# Patient Record
Sex: FEMALE | Race: BLACK OR AFRICAN AMERICAN | HISPANIC OR LATINO | Employment: UNEMPLOYED | URBAN - METROPOLITAN AREA
[De-identification: names, ages, dates, MRNs, and addresses within clinical notes are randomized per-mention and may not be internally consistent; named-entity substitution may affect disease eponyms.]

---

## 2019-07-19 LAB
HBV SURFACE AG SERPL QL IA: NEGATIVE
INDIRECT COOMBS: NORMAL
RUBELLA IMMUNE STATUS: NORMAL

## 2019-08-06 LAB
HCT VFR BLD AUTO: 34 % (ref 36–46)
HGB BLD-MCNC: 11 G/DL (ref 12–16)

## 2019-08-16 LAB
C TRACH RRNA SPEC QL PROBE: NEGATIVE
HIV 1+2 AB+HIV1 P24 AG SERPL QL IA: NEGATIVE
N GONORRHOEAE, AMPLIFIED DNA: NEGATIVE

## 2019-10-15 ENCOUNTER — CLINICAL SUPPORT (OUTPATIENT)
Dept: URGENT CARE | Facility: CLINIC | Age: 29
End: 2019-10-15
Payer: COMMERCIAL

## 2019-10-15 VITALS
OXYGEN SATURATION: 99 % | DIASTOLIC BLOOD PRESSURE: 78 MMHG | HEART RATE: 101 BPM | BODY MASS INDEX: 27.78 KG/M2 | WEIGHT: 177 LBS | TEMPERATURE: 99 F | HEIGHT: 67 IN | SYSTOLIC BLOOD PRESSURE: 128 MMHG

## 2019-10-15 DIAGNOSIS — J00 ACUTE NASOPHARYNGITIS: Primary | ICD-10-CM

## 2019-10-15 DIAGNOSIS — H65.92 MIDDLE EAR EFFUSION, LEFT: ICD-10-CM

## 2019-10-15 DIAGNOSIS — Z34.90 PREGNANCY, UNSPECIFIED GESTATIONAL AGE: ICD-10-CM

## 2019-10-15 PROCEDURE — 99204 OFFICE O/P NEW MOD 45 MIN: CPT | Mod: S$GLB,,, | Performed by: NURSE PRACTITIONER

## 2019-10-15 PROCEDURE — 99204 PR OFFICE/OUTPT VISIT, NEW, LEVL IV, 45-59 MIN: ICD-10-PCS | Mod: S$GLB,,, | Performed by: NURSE PRACTITIONER

## 2019-10-15 RX ORDER — FLUTICASONE PROPIONATE 50 MCG
2 SPRAY, SUSPENSION (ML) NASAL DAILY
Qty: 15.8 ML | Refills: 0 | Status: SHIPPED | OUTPATIENT
Start: 2019-10-15 | End: 2022-02-28 | Stop reason: SDUPTHER

## 2019-10-15 RX ORDER — LORATADINE 10 MG/1
10 TABLET ORAL DAILY
Qty: 30 TABLET | Refills: 11 | Status: SHIPPED | OUTPATIENT
Start: 2019-10-15 | End: 2020-11-22

## 2019-10-15 NOTE — PROGRESS NOTES
"Subjective:       Patient ID: Jane Garcia is a 28 y.o. female.    Vitals:  height is 5' 7" (1.702 m) and weight is 80.3 kg (177 lb). Her oral temperature is 98.5 °F (36.9 °C). Her blood pressure is 128/78 and her pulse is 101. Her oxygen saturation is 99%.     Chief Complaint: Cough    Patient complains of sinus congestion, runny nose, headache, chills that began today. Patient denies fever, sob, chest pain, nausea, vomiting, diarrhea. Patient reports she is 5 months pregnant.     Cough   This is a new problem. The current episode started today. The problem has been gradually worsening. The cough is non-productive. Associated symptoms include chills, headaches, nasal congestion and rhinorrhea. Pertinent negatives include no chest pain, fever, myalgias, rash, sore throat or shortness of breath. She has tried nothing for the symptoms.       Constitution: Positive for chills. Negative for fatigue and fever.   HENT: Negative for congestion and sore throat.    Neck: Negative for painful lymph nodes.   Cardiovascular: Negative for chest pain and leg swelling.   Eyes: Negative for double vision and blurred vision.   Respiratory: Positive for cough. Negative for shortness of breath.    Gastrointestinal: Negative for nausea, vomiting and diarrhea.   Genitourinary: Negative for dysuria, frequency, urgency and history of kidney stones.   Musculoskeletal: Negative for joint pain, joint swelling, muscle cramps and muscle ache.   Skin: Negative for color change, pale, rash and bruising.   Allergic/Immunologic: Negative for seasonal allergies.   Neurological: Positive for headaches. Negative for dizziness, history of vertigo, light-headedness and passing out.   Hematologic/Lymphatic: Negative for swollen lymph nodes.   Psychiatric/Behavioral: Negative for nervous/anxious, sleep disturbance and depression. The patient is not nervous/anxious.        Objective:      Physical Exam   Constitutional: She is oriented to person, " place, and time. Vital signs are normal. She appears well-developed and well-nourished. She is cooperative.  Non-toxic appearance. She does not have a sickly appearance. She does not appear ill. No distress.   HENT:   Head: Normocephalic and atraumatic.   Right Ear: Hearing, tympanic membrane, external ear and ear canal normal.   Left Ear: Hearing, external ear and ear canal normal. A middle ear effusion is present.   Nose: Mucosal edema and rhinorrhea present. No nasal deformity. No epistaxis. Right sinus exhibits no maxillary sinus tenderness and no frontal sinus tenderness. Left sinus exhibits no maxillary sinus tenderness and no frontal sinus tenderness.   Mouth/Throat: Uvula is midline and mucous membranes are normal. No trismus in the jaw. Normal dentition. No uvula swelling. Posterior oropharyngeal erythema present.   Eyes: Conjunctivae and lids are normal. Right eye exhibits no discharge. Left eye exhibits no discharge. No scleral icterus.   Neck: Trachea normal, normal range of motion, full passive range of motion without pain and phonation normal. Neck supple.   Cardiovascular: Normal rate, regular rhythm, normal heart sounds, intact distal pulses and normal pulses.   Pulmonary/Chest: Effort normal and breath sounds normal. No respiratory distress.   Abdominal: Soft. Normal appearance and bowel sounds are normal. She exhibits no distension, no pulsatile midline mass and no mass. There is no tenderness.   Musculoskeletal: Normal range of motion. She exhibits no edema or deformity.   Neurological: She is alert and oriented to person, place, and time. She exhibits normal muscle tone. Coordination normal.   Skin: Skin is warm, dry, intact, not diaphoretic and not pale.   Psychiatric: She has a normal mood and affect. Her speech is normal and behavior is normal. Judgment and thought content normal. Cognition and memory are normal.   Nursing note and vitals reviewed.        Assessment:       1. Acute  nasopharyngitis    2. Pregnancy, unspecified gestational age        Plan:         The patient appears to have a viral upper respiratory infection with a viral syndrome.  Based upon the history and physical exam the patient does not appear to have a serious bacterial infection such as pneumonia, sepsis, otitis media, bacterial sinusitis, strep pharyngitis, parapharyngeal or peritonsillar abscess, meningitis.  I do not think the patient needs antibiotics as their illness likely has a viral etiology.  Patient appears very well and I have given specific return precautions to the patient and/or family members.  I have instructed the patient to hydrate, take over the counter medications and follow up with their regular doctor or the one provided.      Acute nasopharyngitis    Pregnancy, unspecified gestational age    Other orders  -     fluticasone propionate (FLONASE) 50 mcg/actuation nasal spray; 2 sprays (100 mcg total) by Each Nostril route once daily.  Dispense: 15.8 mL; Refill: 0  -     loratadine (CLARITIN) 10 mg tablet; Take 1 tablet (10 mg total) by mouth once daily.  Dispense: 30 tablet; Refill: 11

## 2019-10-15 NOTE — PATIENT INSTRUCTIONS
Viral Upper Respiratory Illness (Adult)  You have a viral upper respiratory illness (URI), which is another term for the common cold. This illness is contagious during the first few days. It is spread through the air by coughing and sneezing. It may also be spread by direct contact (touching the sick person and then touching your own eyes, nose, or mouth). Frequent handwashing will decrease risk of spread. Most viral illnesses go away within 7 to 10 days with rest and simple home remedies. Sometimes the illness may last for several weeks. Antibiotics will not kill a virus, and they are generally not prescribed for this condition.    Home care  · If symptoms are severe, rest at home for the first 2 to 3 days. When you resume activity, don't let yourself get too tired.  · Avoid being exposed to cigarette smoke (yours or others).  · You may use acetaminophen or ibuprofen to control pain and fever, unless another medicine was prescribed. (Note: If you have chronic liver or kidney disease, have ever had a stomach ulcer or gastrointestinal bleeding, or are taking blood-thinning medicines, talk with your healthcare provider before using these medicines.) Aspirin should never be given to anyone under 18 years of age who is ill with a viral infection or fever. It may cause severe liver or brain damage.  · Your appetite may be poor, so a light diet is fine. Avoid dehydration by drinking 6 to 8 glasses of fluids per day (water, soft drinks, juices, tea, or soup). Extra fluids will help loosen secretions in the nose and lungs.  · Over-the-counter cold medicines will not shorten the length of time youre sick, but they may be helpful for the following symptoms: cough, sore throat, and nasal and sinus congestion. (Note: Do not use decongestants if you have high blood pressure.)  Follow-up care  Follow up with your healthcare provider, or as advised.  When to seek medical advice  Call your healthcare provider right away if any  of these occur:  · Cough with lots of colored sputum (mucus)  · Severe headache; face, neck, or ear pain  · Difficulty swallowing due to throat pain  · Fever of 100.4°F (38°C)  Call 911, or get immediate medical care  Call emergency services right away if any of these occur:  · Chest pain, shortness of breath, wheezing, or difficulty breathing  · Coughing up blood  · Inability to swallow due to throat pain  Date Last Reviewed: 9/13/2015  © 4116-4217 [employer]. [address] [place] [address]. All rights reserved. This information is not intended as a substitute for professional medical care. Always follow your healthcare professional's instructions.        Adult Self-Care for Colds  Colds are caused by viruses. They can't be cured with antibiotics. However, you can ease symptoms and support your body's efforts to heal itself.  No matter which symptoms you have, be sure to:  · Drink plenty of fluids (water or clear soup)  · Stop smoking and drinking alcohol  · Get plenty of rest    Understand a fever  · Take your temperature several times a day. If your fever is 100.4°F (38.0°C) for more than a day, call your healthcare provider.  · Relax, lie down. Go to bed if you want. Just get off your feet and rest. Also, drink plenty of fluids to avoid dehydration.  · Take acetaminophen or a nonsteroidal anti-inflammatory agent (NSAID), such as ibuprofen.  Treat a troubled nose kindly  · Breathe steam or heated humidified air to open blocked nasal passages.  a hot shower or use a vaporizer. Be careful not to get burned by the steam.  · Saline nasal sprays and decongestant tablets help open a stuffy nose. Antihistamines can also help, but they can cause side effects such as drowsiness and drying of the eyes, nose, and mouth.  Soothe a sore throat and cough  · Gargle every 2 hours with 1/4 teaspoon of salt dissolved in 1/2 cup of warm water. Suck on throat lozenges and cough drops to moisten your  throat.  · Cough medicines are available but it is unclear how well they actually work.  · Take acetaminophen or an NSAID, such as ibuprofen, to ease throat pain  Ease digestive problems  · Put fluids back into your body. Take frequent sips of clear liquids such as water or broth. Avoid drinks that have a lot of sugar in them, such as juices and sodas. These can make diarrhea worse. Older children and adults can drink sports drinks.  · As your appetite returns, you can resume your normal diet. Ask your healthcare provider if there are any foods you should avoid.  When to seek medical care  When you first notice symptoms, ask your healthcare provider if antiviral medicines are appropriate. Antibiotics should not be taken for colds or flu. Also, call your healthcare provider if you have any of the following symptoms or if you aren't feeling better after 7 days:  · Shortness of breath  · Pain or pressure in the chest or belly (abdomen)  · Worsening symptoms, especially after a period of improvement  · Fever of 100.4°F  (38.0°C) or higher, or fever that doesn't go down with medicine  · Sudden dizziness or confusion  · Severe or continued vomiting  · Signs of dehydration, including extreme thirst, dark urine, infrequent urination, dry mouth  · Spotted, red, or very sore throat   Date Last Reviewed: 12/1/2016  © 9574-2624 [employer]. [address] [place] [address], [place] [address]. All rights reserved. This information is not intended as a substitute for professional medical care. Always follow your healthcare professional's instructions.

## 2020-02-19 ENCOUNTER — HOSPITAL ENCOUNTER (INPATIENT)
Facility: HOSPITAL | Age: 30
LOS: 2 days | Discharge: HOME | End: 2020-02-21
Attending: OBSTETRICS & GYNECOLOGY | Admitting: OBSTETRICS & GYNECOLOGY
Payer: MEDICAID

## 2020-02-19 DIAGNOSIS — B96.89 BACTERIAL VAGINOSIS: ICD-10-CM

## 2020-02-19 DIAGNOSIS — N76.0 BACTERIAL VAGINOSIS: ICD-10-CM

## 2020-02-19 DIAGNOSIS — Z34.90 ENCOUNTER FOR INDUCTION OF LABOR: ICD-10-CM

## 2020-02-19 DIAGNOSIS — Z34.02 ENCOUNTER FOR SUPERVISION OF NORMAL FIRST PREGNANCY IN SECOND TRIMESTER: ICD-10-CM

## 2020-02-19 DIAGNOSIS — O26.892 VAGINAL DISCHARGE DURING PREGNANCY IN SECOND TRIMESTER: Primary | ICD-10-CM

## 2020-02-19 DIAGNOSIS — N89.8 VAGINAL DISCHARGE DURING PREGNANCY IN SECOND TRIMESTER: Primary | ICD-10-CM

## 2020-02-19 LAB
ABO + RH BLD: NORMAL
AMPHET+METHAMPHET UR QL: NEGATIVE
BACTERIA #/AREA URNS HPF: ABNORMAL /HPF
BARBITURATES UR QL SCN>200 NG/ML: NEGATIVE
BASOPHILS # BLD AUTO: 0.03 K/UL (ref 0–0.2)
BASOPHILS NFR BLD: 0.4 % (ref 0–1.9)
BENZODIAZ UR QL SCN>200 NG/ML: NEGATIVE
BILIRUB UR QL STRIP: NEGATIVE
BLD GP AB SCN CELLS X3 SERPL QL: NORMAL
BZE UR QL SCN: NEGATIVE
CANNABINOIDS UR QL SCN: NEGATIVE
CLARITY UR: ABNORMAL
COLOR UR: YELLOW
CREAT UR-MCNC: 213 MG/DL (ref 15–325)
DIFFERENTIAL METHOD: ABNORMAL
EOSINOPHIL # BLD AUTO: 0.1 K/UL (ref 0–0.5)
EOSINOPHIL NFR BLD: 1.3 % (ref 0–8)
ERYTHROCYTE [DISTWIDTH] IN BLOOD BY AUTOMATED COUNT: 15 % (ref 11.5–14.5)
GLUCOSE UR QL STRIP: NEGATIVE
HCT VFR BLD AUTO: 28.7 % (ref 37–48.5)
HGB BLD-MCNC: 8.9 G/DL (ref 12–16)
HGB UR QL STRIP: NEGATIVE
HYALINE CASTS #/AREA URNS LPF: 90 /LPF
IMM GRANULOCYTES # BLD AUTO: 0.05 K/UL (ref 0–0.04)
IMM GRANULOCYTES NFR BLD AUTO: 0.7 % (ref 0–0.5)
KETONES UR QL STRIP: NEGATIVE
LEUKOCYTE ESTERASE UR QL STRIP: NEGATIVE
LYMPHOCYTES # BLD AUTO: 2.1 K/UL (ref 1–4.8)
LYMPHOCYTES NFR BLD: 28.1 % (ref 18–48)
MCH RBC QN AUTO: 24.2 PG (ref 27–31)
MCHC RBC AUTO-ENTMCNC: 31 G/DL (ref 32–36)
MCV RBC AUTO: 78 FL (ref 82–98)
MICROSCOPIC COMMENT: ABNORMAL
MONOCYTES # BLD AUTO: 0.7 K/UL (ref 0.3–1)
MONOCYTES NFR BLD: 8.7 % (ref 4–15)
NEUTROPHILS # BLD AUTO: 4.6 K/UL (ref 1.8–7.7)
NEUTROPHILS NFR BLD: 60.8 % (ref 38–73)
NITRITE UR QL STRIP: NEGATIVE
NRBC BLD-RTO: 0 /100 WBC
OPIATES UR QL SCN: NEGATIVE
PCP UR QL SCN>25 NG/ML: NEGATIVE
PCP UR QL SCN>25 NG/ML: NEGATIVE
PH UR STRIP: 8 [PH] (ref 5–8)
PLATELET # BLD AUTO: 412 K/UL (ref 150–350)
PMV BLD AUTO: 9.3 FL (ref 9.2–12.9)
PROT UR QL STRIP: ABNORMAL
RBC # BLD AUTO: 3.68 M/UL (ref 4–5.4)
RBC #/AREA URNS HPF: 0 /HPF (ref 0–4)
SP GR UR STRIP: 1.02 (ref 1–1.03)
SQUAMOUS #/AREA URNS HPF: 34 /HPF
TOXICOLOGY INFORMATION: NORMAL
URN SPEC COLLECT METH UR: ABNORMAL
UROBILINOGEN UR STRIP-ACNC: NEGATIVE EU/DL
WBC # BLD AUTO: 7.48 K/UL (ref 3.9–12.7)
WBC #/AREA URNS HPF: 2 /HPF (ref 0–5)

## 2020-02-19 PROCEDURE — 80307 DRUG TEST PRSMV CHEM ANLYZR: CPT

## 2020-02-19 PROCEDURE — 86592 SYPHILIS TEST NON-TREP QUAL: CPT

## 2020-02-19 PROCEDURE — 85025 COMPLETE CBC W/AUTO DIFF WBC: CPT

## 2020-02-19 PROCEDURE — 81001 URINALYSIS AUTO W/SCOPE: CPT

## 2020-02-19 PROCEDURE — 12000002 HC ACUTE/MED SURGE SEMI-PRIVATE ROOM

## 2020-02-19 PROCEDURE — 86901 BLOOD TYPING SEROLOGIC RH(D): CPT

## 2020-02-19 PROCEDURE — 36415 COLL VENOUS BLD VENIPUNCTURE: CPT

## 2020-02-19 PROCEDURE — 63600175 PHARM REV CODE 636 W HCPCS: Performed by: OBSTETRICS & GYNECOLOGY

## 2020-02-19 PROCEDURE — 25000003 PHARM REV CODE 250: Performed by: OBSTETRICS & GYNECOLOGY

## 2020-02-19 RX ORDER — MISOPROSTOL 100 UG/1
600 TABLET ORAL
Status: CANCELLED | OUTPATIENT
Start: 2020-02-19

## 2020-02-19 RX ORDER — ONDANSETRON 2 MG/ML
4 INJECTION INTRAMUSCULAR; INTRAVENOUS EVERY 6 HOURS PRN
Status: DISCONTINUED | OUTPATIENT
Start: 2020-02-19 | End: 2020-02-21 | Stop reason: HOSPADM

## 2020-02-19 RX ORDER — SIMETHICONE 80 MG
1 TABLET,CHEWABLE ORAL 4 TIMES DAILY PRN
Status: DISCONTINUED | OUTPATIENT
Start: 2020-02-19 | End: 2020-02-21 | Stop reason: HOSPADM

## 2020-02-19 RX ORDER — BUTORPHANOL TARTRATE 2 MG/ML
1 INJECTION INTRAMUSCULAR; INTRAVENOUS
Status: DISCONTINUED | OUTPATIENT
Start: 2020-02-19 | End: 2020-02-21 | Stop reason: HOSPADM

## 2020-02-19 RX ORDER — OXYTOCIN-SODIUM CHLORIDE 0.9% IV SOLN 30 UNIT/500ML 30-0.9/5 UT/ML-%
2 SOLUTION INTRAVENOUS CONTINUOUS
Status: DISCONTINUED | OUTPATIENT
Start: 2020-02-19 | End: 2020-02-21 | Stop reason: HOSPADM

## 2020-02-19 RX ORDER — SODIUM CHLORIDE 9 MG/ML
INJECTION, SOLUTION INTRAVENOUS
Status: DISCONTINUED | OUTPATIENT
Start: 2020-02-19 | End: 2020-02-21 | Stop reason: HOSPADM

## 2020-02-19 RX ORDER — BUTORPHANOL TARTRATE 2 MG/ML
2 INJECTION INTRAMUSCULAR; INTRAVENOUS
Status: CANCELLED | OUTPATIENT
Start: 2020-02-19

## 2020-02-19 RX ORDER — CALCIUM CARBONATE 200(500)MG
500 TABLET,CHEWABLE ORAL 3 TIMES DAILY PRN
Status: DISCONTINUED | OUTPATIENT
Start: 2020-02-19 | End: 2020-02-21 | Stop reason: HOSPADM

## 2020-02-19 RX ORDER — SODIUM CHLORIDE, SODIUM LACTATE, POTASSIUM CHLORIDE, CALCIUM CHLORIDE 600; 310; 30; 20 MG/100ML; MG/100ML; MG/100ML; MG/100ML
INJECTION, SOLUTION INTRAVENOUS CONTINUOUS
Status: DISCONTINUED | OUTPATIENT
Start: 2020-02-19 | End: 2020-02-21 | Stop reason: HOSPADM

## 2020-02-19 RX ORDER — ZOLPIDEM TARTRATE 5 MG/1
5 TABLET ORAL NIGHTLY PRN
Status: DISCONTINUED | OUTPATIENT
Start: 2020-02-19 | End: 2020-02-21 | Stop reason: HOSPADM

## 2020-02-19 RX ADMIN — PROMETHAZINE HYDROCHLORIDE 12.5 MG: 25 INJECTION INTRAMUSCULAR; INTRAVENOUS at 08:02

## 2020-02-19 RX ADMIN — BUTORPHANOL TARTRATE 1 MG: 2 INJECTION, SOLUTION INTRAMUSCULAR; INTRAVENOUS at 08:02

## 2020-02-19 RX ADMIN — DINOPROSTONE 10 MG: 10 INSERT VAGINAL at 01:02

## 2020-02-19 RX ADMIN — SODIUM CHLORIDE, POTASSIUM CHLORIDE, SODIUM LACTATE AND CALCIUM CHLORIDE 125 ML/HR: 600; 310; 30; 20 INJECTION, SOLUTION INTRAVENOUS at 10:02

## 2020-02-19 RX ADMIN — ZOLPIDEM TARTRATE 5 MG: 5 TABLET ORAL at 07:02

## 2020-02-20 ENCOUNTER — ANESTHESIA EVENT (OUTPATIENT)
Dept: OBSTETRICS AND GYNECOLOGY | Facility: HOSPITAL | Age: 30
End: 2020-02-20
Payer: MEDICAID

## 2020-02-20 ENCOUNTER — ANESTHESIA (OUTPATIENT)
Dept: OBSTETRICS AND GYNECOLOGY | Facility: HOSPITAL | Age: 30
End: 2020-02-20
Payer: MEDICAID

## 2020-02-20 LAB — RPR SER QL: NORMAL

## 2020-02-20 PROCEDURE — 99900035 HC TECH TIME PER 15 MIN (STAT)

## 2020-02-20 PROCEDURE — 25000003 PHARM REV CODE 250: Performed by: OBSTETRICS & GYNECOLOGY

## 2020-02-20 PROCEDURE — 62326 NJX INTERLAMINAR LMBR/SAC: CPT | Performed by: ANESTHESIOLOGY

## 2020-02-20 PROCEDURE — 12000002 HC ACUTE/MED SURGE SEMI-PRIVATE ROOM

## 2020-02-20 PROCEDURE — C1751 CATH, INF, PER/CENT/MIDLINE: HCPCS | Performed by: ANESTHESIOLOGY

## 2020-02-20 PROCEDURE — 63600175 PHARM REV CODE 636 W HCPCS: Performed by: ANESTHESIOLOGY

## 2020-02-20 PROCEDURE — 72200004 HC VAGINAL DELIVERY LEVEL I

## 2020-02-20 PROCEDURE — 63600175 PHARM REV CODE 636 W HCPCS: Performed by: OBSTETRICS & GYNECOLOGY

## 2020-02-20 PROCEDURE — 94761 N-INVAS EAR/PLS OXIMETRY MLT: CPT

## 2020-02-20 RX ORDER — NALOXONE HCL 0.4 MG/ML
0.4 VIAL (ML) INJECTION SEE ADMIN INSTRUCTIONS
Status: DISCONTINUED | OUTPATIENT
Start: 2020-02-20 | End: 2020-02-21 | Stop reason: HOSPADM

## 2020-02-20 RX ORDER — ONDANSETRON 2 MG/ML
4 INJECTION INTRAMUSCULAR; INTRAVENOUS ONCE
Status: DISCONTINUED | OUTPATIENT
Start: 2020-02-20 | End: 2020-02-21 | Stop reason: HOSPADM

## 2020-02-20 RX ORDER — DIPHENHYDRAMINE HYDROCHLORIDE 50 MG/ML
12.5 INJECTION INTRAMUSCULAR; INTRAVENOUS EVERY 4 HOURS PRN
Status: DISCONTINUED | OUTPATIENT
Start: 2020-02-20 | End: 2020-02-21 | Stop reason: HOSPADM

## 2020-02-20 RX ORDER — OXYCODONE AND ACETAMINOPHEN 10; 325 MG/1; MG/1
1 TABLET ORAL EVERY 4 HOURS PRN
Status: DISCONTINUED | OUTPATIENT
Start: 2020-02-20 | End: 2020-02-21 | Stop reason: HOSPADM

## 2020-02-20 RX ORDER — ROPIVACAINE HYDROCHLORIDE 2 MG/ML
INJECTION, SOLUTION EPIDURAL; INFILTRATION
Status: DISCONTINUED | OUTPATIENT
Start: 2020-02-20 | End: 2020-02-20

## 2020-02-20 RX ORDER — FENTANYL/BUPIVACAINE/NS/PF 2-625MCG/1
PLASTIC BAG, INJECTION (ML) INJECTION CONTINUOUS
Status: DISCONTINUED | OUTPATIENT
Start: 2020-02-20 | End: 2020-02-21 | Stop reason: HOSPADM

## 2020-02-20 RX ORDER — OXYCODONE AND ACETAMINOPHEN 5; 325 MG/1; MG/1
1 TABLET ORAL EVERY 4 HOURS PRN
Status: DISCONTINUED | OUTPATIENT
Start: 2020-02-20 | End: 2020-02-21 | Stop reason: HOSPADM

## 2020-02-20 RX ORDER — EPHEDRINE SULFATE 50 MG/ML
10 INJECTION, SOLUTION INTRAVENOUS ONCE AS NEEDED
Status: DISCONTINUED | OUTPATIENT
Start: 2020-02-20 | End: 2020-02-21 | Stop reason: HOSPADM

## 2020-02-20 RX ORDER — ONDANSETRON 4 MG/1
8 TABLET, ORALLY DISINTEGRATING ORAL EVERY 8 HOURS PRN
Status: DISCONTINUED | OUTPATIENT
Start: 2020-02-20 | End: 2020-02-21 | Stop reason: HOSPADM

## 2020-02-20 RX ADMIN — ROPIVACAINE HYDROCHLORIDE 3 ML: 2 INJECTION, SOLUTION EPIDURAL; INFILTRATION at 03:02

## 2020-02-20 RX ADMIN — IBUPROFEN 600 MG: 200 TABLET, FILM COATED ORAL at 05:02

## 2020-02-20 RX ADMIN — ROPIVACAINE HYDROCHLORIDE 3 ML: 2 INJECTION, SOLUTION EPIDURAL; INFILTRATION at 02:02

## 2020-02-20 RX ADMIN — Medication 2 MILLI-UNITS/MIN: at 01:02

## 2020-02-20 RX ADMIN — OXYCODONE HYDROCHLORIDE AND ACETAMINOPHEN 1 TABLET: 10; 325 TABLET ORAL at 10:02

## 2020-02-20 RX ADMIN — OXYCODONE HYDROCHLORIDE AND ACETAMINOPHEN 1 TABLET: 10; 325 TABLET ORAL at 05:02

## 2020-02-20 RX ADMIN — SODIUM CHLORIDE, POTASSIUM CHLORIDE, SODIUM LACTATE AND CALCIUM CHLORIDE: 600; 310; 30; 20 INJECTION, SOLUTION INTRAVENOUS at 11:02

## 2020-02-20 RX ADMIN — OXYCODONE AND ACETAMINOPHEN 1 TABLET: 5; 325 TABLET ORAL at 12:02

## 2020-02-20 RX ADMIN — SODIUM CHLORIDE, POTASSIUM CHLORIDE, SODIUM LACTATE AND CALCIUM CHLORIDE 1000 ML: 600; 310; 30; 20 INJECTION, SOLUTION INTRAVENOUS at 01:02

## 2020-02-20 RX ADMIN — SODIUM CHLORIDE, POTASSIUM CHLORIDE, SODIUM LACTATE AND CALCIUM CHLORIDE 125 ML/HR: 600; 310; 30; 20 INJECTION, SOLUTION INTRAVENOUS at 05:02

## 2020-02-20 NOTE — PROGRESS NOTES
1002: Updated Dr. Andujar; late decels noted, pt repositioned, bolused fluids, decreased pitocin gtt from 6cc/hr to 10cc/hr; FHR better. SVE: 4/70/-2  1132: Updated Dr. Andujar: Pt c/o feeling pressure; variable noted on strip; SVE: 9.5/100/0. MD states to notify her when pt is ready for delivery.

## 2020-02-20 NOTE — PLAN OF CARE
Pt AAOx4. VSS. Denies pain. POC reviewed with pt; all questions answered. NADN. Will continue to monitor.

## 2020-02-20 NOTE — PLAN OF CARE
Plan of care reviewed with Pt verbalized understanding.  2/20/2020 @ 0644- Reported to Dr Andujar Pt progress and FHT, no new orders.

## 2020-02-20 NOTE — SUBJECTIVE & OBJECTIVE
Interval History:  Jane KULKARNI is a 29 y.o.  at 39w5d. She is doing well. She is comfortable after epidural    Objective:     Vital Signs (Most Recent):  Temp: 98.3 °F (36.8 °C) (20)  Pulse: 78 (20)  Resp: 18 (20)  BP: 125/74 (20) Vital Signs (24h Range):  Temp:  [98 °F (36.7 °C)-98.8 °F (37.1 °C)] 98.3 °F (36.8 °C)  Pulse:  [] 78  Resp:  [16-18] 18  BP: (109-140)/(61-87) 125/74     Weight: 83 kg (183 lb)  Body mass index is 28.66 kg/m².    FHT: Cat 1 (reassuring)  TOCO:  Q 2 minutes    Cervical Exam:  Dilation:  3  Effacement:  70  Station: -2  Presentation: Vertex     Significant Labs:  Lab Results   Component Value Date    GROUPTRH A POS 2020    HEPBSAG Negative 2019       I have personallly reviewed all pertinent lab results from the last 24 hours.    Physical Exam

## 2020-02-20 NOTE — PLAN OF CARE
Pt AAOx4. VSS. Epidural infusing; pt resting comfortably in bed. Denies pain @ this time. Bingham in place draining clear, yellow urine. POC reviewed with pt; all questions answered. NADN. Will continue to monitor.

## 2020-02-20 NOTE — L&D DELIVERY NOTE
[facility]  Vaginal Delivery   Operative Note    SUMMARY     Normal spontaneous vaginal delivery of live infant, . The patient began pushing at c/c/+1.  After 5 mins of pushing, infant delivered in OA position.  Shoulders/body followed easily.  Infant placed on patient's abdomen with nursery nurse present.  Cord clamped and cut.  Placenta S/S/I.  Uterus firm below umbilicus.  Sponge, lap, needle counts correct.  The patient tolerated well.  EBL 200ml, apgars 9/9.  Infant delivered position OA over intact perineum.  Nuchal cord: No.    Spontaneous delivery of placenta and IV pitocin given noting good uterine tone.  No lacerations noted.  Patient tolerated delivery well. Sponge needle and lap counted correctly x2.    Indications: Encounter for induction of labor  Pregnancy complicated by:   Patient Active Problem List   Diagnosis    Vaginal discharge during pregnancy in second trimester    Supervision of normal pregnancy in second trimester    Bacterial vaginosis    Encounter for induction of labor     Admitting GA: 39w5d    Delivery Information for Jai Garcia    Birth information:  YOB: 2022   Time of birth: 12:03 PM   Sex: male   Head Delivery Date/Time: 2/20/2020 12:03 PM   Delivery type: Vaginal, Spontaneous   Gestational Age: 39w5d    Delivery Providers    Delivering clinician:  Celina Andujar MD   Provider Role    Darnell Reyes, JOHN Scrub Nurse    Sara Ann RN Delivery Nurse    Micki Simon RN Delivery Nurse    Chano Servin, NICOLE Registered Nurse            Measurements    Weight:    Length:           Apgars    Living status:  Living  Apgars:   1 min.:   5 min.:   10 min.:   15 min.:   20 min.:     Skin color:   1  1       Heart rate:   2  2       Reflex irritability:   2  2       Muscle tone:   2  2       Respiratory effort:   2  2       Total:   9  9       Apgars assigned by:  CHANO SERVIN RN         Operative Delivery    Forceps attempted?:  No  Vacuum  extractor attempted?:  No         Shoulder Dystocia    Shoulder dystocia present?:  No           Presentation    Presentation:  Vertex           Interventions/Resuscitation    Method:  Bulb Suctioning, Tactile Stimulation       Cord    Vessels:  3 vessels  Complications:  None  Delayed Cord Clamping?:  No  Cord Clamped Date/Time:  2020 12:04 PM  Cord Blood Disposition:  Sent with Baby  Gases Sent?:  No  Stem Cell Collection (by MD):  No       Placenta    Placenta delivery date/time:  2020 1206  Placenta removal:  Spontaneous  Placenta appearance:  Intact  Placenta disposition:  discarded           Labor Events:       labor: No     Labor Onset Date/Time: 2020       Dilation Complete Date/Time: 2020 11:47     Start Pushing Date/Time: 2020 12:00       Start Pushing Date/Time: 2020 12:00     Rupture Date/Time: 20         Rupture type: artificial rupture of membranes         Fluid Amount:        Fluid Color: Clear      Fluid Odor:        Membrane Status: ARM (Artificial Rupture)               steroids: None     Antibiotics given for GBS: No     Induction: dinoprostone insert     Indications for induction:  Elective     Augmentation: oxytocin     Indications for augmentation:       Labor complications: None     Additional complications:          Cervical ripening:                     Delivery:      Episiotomy:       Indication for Episiotomy:       Perineal Lacerations:   Repaired:      Periurethral Laceration:   Repaired:     Labial Laceration:   Repaired:     Sulcus Laceration:   Repaired:     Vaginal Laceration:   Repaired:     Cervical Laceration:   Repaired:     Repair suture:       Repair # of packets:       Last Value - EBL - Nursing (mL):       Sum - EBL - Nursing (mL): 0     Last Value - EBL - Anesthesia (mL):      Calculated QBL (mL):        Vaginal Sweep Performed:       Surgicount Correct:         Other providers:       Anesthesia    Method:   Epidural          Details (if applicable):  Trial of Labor      Categorization:      Priority:     Indications for :     Incision Type:       Additional  information:  Forceps:    Vacuum:    Breech:    Observed anomalies    Other (Comments): Band # [id number]; second band given to FOB

## 2020-02-20 NOTE — PLAN OF CARE
Pt AAOx4. VSS. S/p delivery of viable baby boy. Fundus firm @ Umbilicus; lochia small. Bingham in place, draining clear, yellow urine. Pitocin gtt infusing @ 95cc/hr. Pain controlled per PRN pain medication. Fall precautions maintained. POC reviewed with pt; all questions answered. NADN. Will continue to monitor.

## 2020-02-20 NOTE — ANESTHESIA PROCEDURE NOTES
"Epidural    Patient location during procedure: OB   Reason for block: primary anesthetic   Diagnosis: IUP   Start time: 2/20/2020 2:57 AM  Timeout: 2/20/2020 2:52 AM  End time: 2/20/2020 2:59 AM    Staffing  Performing Provider: Juan Young MD  Authorizing Provider: Juan Yuong MD        Preanesthetic Checklist  Completed: patient identified, site marked, surgical consent, pre-op evaluation, timeout performed, IV checked, risks and benefits discussed, monitors and equipment checked, anesthesia consent given, hand hygiene performed and patient being monitored  Preparation  Patient position: sitting  Prep: Betadine  Patient monitoring: ECG and Blood Pressure  Epidural  Skin Anesthetic: lidocaine 1%  Skin Wheal: 5 mL  Administration type: continuous  Approach: midline  Interspace: L3-4    Injection technique: JAMIE air  Needle and Epidural Catheter  Needle type: Tuohy   Needle gauge: 17  Needle length: 3.5 inches  Needle insertion depth: 2.5 cm  Catheter type: springwound  Catheter size: 19 G  Catheter at skin depth: 14 cm  Test dose: 3 mL of lidocaine 1.5% with Epi 1-to-200,000  Additional Documentation: incremental injection, negative aspiration for heme and CSF, no paresthesia on injection, no signs/symptoms of IV or SA injection, no significant pain on injection and no significant complaints from patient  Needle localization: anatomical landmarks  Assessment  Upper dermatomal levels - Left: T9  Right: T9   Dermatomal levels determined by alcohol wipe  Ease of block: easy  Patient's tolerance of the procedure: comfortable throughout block and no complaints  Additional Notes  The patient was ID and examined with chart and labs reviewed. The risk benefits alternatives to the epidural were discussed with the patient with all questions answered and the patient asked if she desired the epidural and she responded "yes". The consents were signed and a timeout performed.   No inadvertent dural puncture with " Kayley.  Dural puncture not performed with spinal needle

## 2020-02-20 NOTE — PROGRESS NOTES
[facility]  Obstetrics  Labor Progress Note    Patient Name: Jane Garcia  MRN: [id number]  Admission Date: 2020  Hospital Length of Stay: 1 days  Attending Physician: Celina Andujar MD  Primary Care Provider: Primary Doctor No    Subjective:     Principal Problem:Encounter for induction of labor    Hospital Course:  No notes on file    Interval History:  Jane KULKARNI is a 29 y.o.  at 39w5d. She is doing well. She is comfortable after epidural    Objective:     Vital Signs (Most Recent):  Temp: 98.3 °F (36.8 °C) (20)  Pulse: 78 (20)  Resp: 18 (20)  BP: 125/74 (20) Vital Signs (24h Range):  Temp:  [98 °F (36.7 °C)-98.8 °F (37.1 °C)] 98.3 °F (36.8 °C)  Pulse:  [] 78  Resp:  [16-18] 18  BP: (109-140)/(61-87) 125/74     Weight: 83 kg (183 lb)  Body mass index is 28.66 kg/m².    FHT: Cat 1 (reassuring)  TOCO:  Q 2 minutes    Cervical Exam:  Dilation:  3  Effacement:  70  Station: -2  Presentation: Vertex     Significant Labs:  Lab Results   Component Value Date    GROUPTRH A POS 2020    HEPBSAG Negative 2019       I have personallly reviewed all pertinent lab results from the last 24 hours.    Physical Exam    Assessment/Plan:     29 y.o. female  at 39w5d for:    * Encounter for induction of labor  IUP at 39.5 for IOL  Doing well    AROM - clear  Continue pitocin          Celina Andujar MD  Obstetrics  [facility] hospital

## 2020-02-20 NOTE — NURSING TRANSFER
Nursing Transfer Note      2/20/2020     Transfer FROM L&D to [facility]; [facility]    Transfer via wheelchair    Transfer with pt belongings    Transported by RN    Medicines sent: N/A    Chart send with patient: Yes    Notified: , with pt    Upon arrival to floor: Pt AAOx4. NADN. Bedside report handed off to NICOLE Baires

## 2020-02-20 NOTE — ANESTHESIA PREPROCEDURE EVALUATION
02/20/2020  Jane Garcia is a 29 y.o., female.  Patient Active Problem List   Diagnosis    Vaginal discharge during pregnancy in second trimester    Supervision of normal pregnancy in second trimester    Bacterial vaginosis    Encounter for induction of labor       History reviewed. No pertinent surgical history.     Tobacco Use:  The patient  reports that she has never smoked. She does not have any smokeless tobacco history on file.     No results found for this or any previous visit.          Lab Results   Component Value Date    WBC 7.48 02/19/2020    HGB 8.9 (L) 02/19/2020    HCT 28.7 (L) 02/19/2020    MCV 78 (L) 02/19/2020     (H) 02/19/2020     BMP  Lab Results   Component Value Date     11/29/2016    K 3.1 (L) 11/29/2016     11/29/2016    CO2 22 (L) 11/29/2016    BUN 5 (L) 11/29/2016    CREATININE 0.9 11/29/2016    CALCIUM 9.1 11/29/2016    ANIONGAP 11 11/29/2016    ESTGFRAFRICA >60 11/29/2016    EGFRNONAA >60 11/29/2016         Anesthesia Evaluation    I have reviewed the Patient Summary Reports.    I have reviewed the Nursing Notes.   I have reviewed the Medications.     Review of Systems  Anesthesia Hx:  No previous Anesthesia Denies Hx of Anesthetic complications  Neg history of prior surgery. Denies Family Hx of Anesthesia complications.   Denies Personal Hx of Anesthesia complications.   Social:  No Alcohol Use, Non-Smoker    Hematology/Oncology:     Oncology Normal    -- Anemia:   EENT/Dental:EENT/Dental Normal   Cardiovascular:  Cardiovascular Normal     Pulmonary:  Pulmonary Normal    Renal/:  Renal/ Normal     Hepatic/GI:   GERD, well controlled    Musculoskeletal:  Musculoskeletal Normal    Neurological:  Neurology Normal    Endocrine:  Endocrine Normal    Dermatological:  Skin Normal    Psych:  Psychiatric Normal           Physical Exam  General:  Well  nourished    Airway/Jaw/Neck:  Airway Findings: Mouth Opening: Normal Tongue: Normal  General Airway Assessment: Adult  Mallampati: III  Improves to III with phonation.  TM Distance: < 4 cm  Jaw/Neck Findings:  Neck ROM: Normal ROM      Dental:  Dental Findings: In tact   Chest/Lungs:  Chest/Lungs Findings: Clear to auscultation, Normal Respiratory Rate     Heart/Vascular:  Heart Findings: Rate: Normal  Rhythm: Regular Rhythm  Sounds: Normal        Mental Status:  Mental Status Findings:  Cooperative, Alert and Oriented         Anesthesia Plan  Type of Anesthesia, risks & benefits discussed:  Anesthesia Type:  epidural  Patient's Preference: epidural  Intra-op Monitoring Plan: standard ASA monitors  Intra-op Monitoring Plan Comments:   Post Op Pain Control Plan: per primary service following discharge from PACU  Post Op Pain Control Plan Comments:   Induction:    Beta Blocker:  Patient is not currently on a Beta-Blocker (No further documentation required).       Informed Consent: Patient understands risks and agrees with Anesthesia plan.  Questions answered. Anesthesia consent signed with patient.  ASA Score: 2     Day of Surgery Review of History & Physical:        Anesthesia Plan Notes: Labor Epidural        Ready For Surgery From Anesthesia Perspective.

## 2020-02-21 VITALS
BODY MASS INDEX: 28.72 KG/M2 | HEART RATE: 74 BPM | TEMPERATURE: 98 F | HEIGHT: 67 IN | SYSTOLIC BLOOD PRESSURE: 120 MMHG | OXYGEN SATURATION: 74 % | WEIGHT: 183 LBS | DIASTOLIC BLOOD PRESSURE: 81 MMHG | RESPIRATION RATE: 18 BRPM

## 2020-02-21 LAB
BASOPHILS # BLD AUTO: 0.05 K/UL (ref 0–0.2)
BASOPHILS NFR BLD: 0.5 % (ref 0–1.9)
DIFFERENTIAL METHOD: ABNORMAL
EOSINOPHIL # BLD AUTO: 0.2 K/UL (ref 0–0.5)
EOSINOPHIL NFR BLD: 2.1 % (ref 0–8)
ERYTHROCYTE [DISTWIDTH] IN BLOOD BY AUTOMATED COUNT: 15.3 % (ref 11.5–14.5)
HCT VFR BLD AUTO: 30.2 % (ref 37–48.5)
HGB BLD-MCNC: 9.2 G/DL (ref 12–16)
IMM GRANULOCYTES # BLD AUTO: 0.06 K/UL (ref 0–0.04)
IMM GRANULOCYTES NFR BLD AUTO: 0.6 % (ref 0–0.5)
LYMPHOCYTES # BLD AUTO: 3.4 K/UL (ref 1–4.8)
LYMPHOCYTES NFR BLD: 31 % (ref 18–48)
MCH RBC QN AUTO: 24.1 PG (ref 27–31)
MCHC RBC AUTO-ENTMCNC: 30.5 G/DL (ref 32–36)
MCV RBC AUTO: 79 FL (ref 82–98)
MONOCYTES # BLD AUTO: 1 K/UL (ref 0.3–1)
MONOCYTES NFR BLD: 9 % (ref 4–15)
NEUTROPHILS # BLD AUTO: 6.2 K/UL (ref 1.8–7.7)
NEUTROPHILS NFR BLD: 56.8 % (ref 38–73)
NRBC BLD-RTO: 0 /100 WBC
PLATELET # BLD AUTO: 405 K/UL (ref 150–350)
PMV BLD AUTO: 9.1 FL (ref 9.2–12.9)
RBC # BLD AUTO: 3.81 M/UL (ref 4–5.4)
WBC # BLD AUTO: 10.82 K/UL (ref 3.9–12.7)

## 2020-02-21 PROCEDURE — 85025 COMPLETE CBC W/AUTO DIFF WBC: CPT

## 2020-02-21 PROCEDURE — 36415 COLL VENOUS BLD VENIPUNCTURE: CPT

## 2020-02-21 PROCEDURE — 25000003 PHARM REV CODE 250: Performed by: OBSTETRICS & GYNECOLOGY

## 2020-02-21 RX ORDER — SIMETHICONE 80 MG
1 TABLET,CHEWABLE ORAL EVERY 6 HOURS PRN
Status: DISCONTINUED | OUTPATIENT
Start: 2020-02-21 | End: 2020-02-21 | Stop reason: HOSPADM

## 2020-02-21 RX ORDER — DIPHENHYDRAMINE HCL 25 MG
25 CAPSULE ORAL EVERY 4 HOURS PRN
Status: DISCONTINUED | OUTPATIENT
Start: 2020-02-21 | End: 2020-02-21 | Stop reason: HOSPADM

## 2020-02-21 RX ORDER — PRENATAL WITH FERROUS FUM AND FOLIC ACID 3080; 920; 120; 400; 22; 1.84; 3; 20; 10; 1; 12; 200; 27; 25; 2 [IU]/1; [IU]/1; MG/1; [IU]/1; MG/1; MG/1; MG/1; MG/1; MG/1; MG/1; UG/1; MG/1; MG/1; MG/1; MG/1
1 TABLET ORAL DAILY
Status: DISCONTINUED | OUTPATIENT
Start: 2020-02-21 | End: 2020-02-21 | Stop reason: HOSPADM

## 2020-02-21 RX ORDER — OXYCODONE AND ACETAMINOPHEN 5; 325 MG/1; MG/1
1 TABLET ORAL EVERY 4 HOURS PRN
Qty: 10 TABLET | Refills: 0 | OUTPATIENT
Start: 2020-02-21 | End: 2020-11-22

## 2020-02-21 RX ORDER — IBUPROFEN 600 MG/1
600 TABLET ORAL EVERY 6 HOURS PRN
Qty: 20 TABLET | Refills: 2 | Status: SHIPPED | OUTPATIENT
Start: 2020-02-21 | End: 2023-01-05

## 2020-02-21 RX ORDER — HYDROCORTISONE 25 MG/G
CREAM TOPICAL 3 TIMES DAILY PRN
Status: DISCONTINUED | OUTPATIENT
Start: 2020-02-21 | End: 2020-02-21 | Stop reason: HOSPADM

## 2020-02-21 RX ORDER — DOCUSATE SODIUM 100 MG/1
200 CAPSULE, LIQUID FILLED ORAL 2 TIMES DAILY PRN
Status: DISCONTINUED | OUTPATIENT
Start: 2020-02-21 | End: 2020-02-21 | Stop reason: HOSPADM

## 2020-02-21 RX ADMIN — OXYCODONE AND ACETAMINOPHEN 1 TABLET: 5; 325 TABLET ORAL at 08:02

## 2020-02-21 RX ADMIN — OXYCODONE HYDROCHLORIDE AND ACETAMINOPHEN 1 TABLET: 10; 325 TABLET ORAL at 12:02

## 2020-02-21 RX ADMIN — IBUPROFEN 600 MG: 200 TABLET, FILM COATED ORAL at 09:02

## 2020-02-21 RX ADMIN — PRENATAL VIT W/ FE FUMARATE-FA TAB 27-0.8 MG 1 TABLET: 27-0.8 TAB at 08:02

## 2020-02-21 RX ADMIN — OXYCODONE AND ACETAMINOPHEN 1 TABLET: 5; 325 TABLET ORAL at 02:02

## 2020-02-21 NOTE — NURSING
Discharge order received. Instructions with education printed and given to patient. Patient verbally educated on discharge instructions. All questions are answered and denies needs at this time.

## 2020-02-21 NOTE — DISCHARGE SUMMARY
[facility]  Obstetrics  Discharge Summary      Patient Name: Jane Garcia  MRN: [id number]  Admission Date: 2020  Hospital Length of Stay: 2 days  Discharge Date and Time:  2020 1:10 PM  Attending Physician: Yanelis Andujar MD   Discharging Provider: Hannah Baugh MD   Primary Care Provider: Primary Doctor No    HPI: No notes on file    Hospital Course:   Admitted for IOL at 39 wga  Uncomplicated    PPD #1   Desires discharge today  Breastfeeding good  Bleeding light     Consults (From admission, onward)        Status Ordering Provider     Consult to Lactation  Use PRN     Provider:  (Not yet assigned)    Acknowledged YANELIS ANDUJAR     Inpatient consult to Anesthesiology  Once     Provider:  (Not yet assigned)    YANELIS Martin          Final Active Diagnoses:    Diagnosis Date Noted POA    PRINCIPAL PROBLEM:  Encounter for induction of labor [Z34.90] 2020 Not Applicable      Problems Resolved During this Admission:        Labs:   CBC   Recent Labs   Lab 20  1343 20  0557   WBC 7.48 10.82   HGB 8.9* 9.2*   HCT 28.7* 30.2*   * 405*       Feeding Method: breast    Immunizations     Date Immunization Status Dose Route/Site Given by    20 0538 MMR Incomplete 0.5 mL Subcutaneous/Left deltoid     20 0538 Tdap Incomplete 0.5 mL Intramuscular/Left deltoid           Delivery:    Episiotomy: None   Lacerations: None   Repair suture: None   Repair # of packets:     Blood loss (ml): 200     Birth information:  YOB: 2022   Time of birth: 12:03 PM   Sex: male   Delivery type: Vaginal, Spontaneous   Gestational Age: 39w5d    Delivery Clinician:      Other providers:       Additional  information:  Forceps:    Vacuum:    Breech:    Observed anomalies      Living?:           APGARS  One minute Five minutes Ten minutes   Skin color:         Heart rate:         Grimace:         Muscle tone:         Breathing:         Totals: 8   9        Placenta: Delivered:       appearance    Pending Diagnostic Studies:     None          Discharged Condition: good    Disposition: Home or Self Care    Follow Up:  Follow-up Information     Celina Andujar MD In 4 weeks.    Specialty:  Obstetrics  Contact information:  [address] [facility]  [address]  [facility]  [facility] [address]  [phone]                 Patient Instructions:      Diet general     Pelvic Rest     Medications:  Current Discharge Medication List      START taking these medications    Details   ibuprofen (ADVIL,MOTRIN) 600 MG tablet Take 1 tablet (600 mg total) by mouth every 6 (six) hours as needed (cramping).  Qty: 20 tablet, Refills: 2      oxyCODONE-acetaminophen (PERCOCET) 5-325 mg per tablet Take 1 tablet by mouth every 4 (four) hours as needed.  Qty: 10 tablet, Refills: 0    Comments: Quantity prescribed more than 7 day supply? No         CONTINUE these medications which have NOT CHANGED    Details   fluticasone propionate (FLONASE) 50 mcg/actuation nasal spray 2 sprays (100 mcg total) by Each Nostril route once daily.  Qty: 15.8 mL, Refills: 0      loratadine (CLARITIN) 10 mg tablet Take 1 tablet (10 mg total) by mouth once daily.  Qty: 30 tablet, Refills: 11      prenatal vit/iron fum/folic ac (PRENATAL 1+1 ORAL) Take by mouth.             Hannah Baugh MD  Obstetrics  Formerly [facility], [facility]

## 2020-02-21 NOTE — ANESTHESIA POSTPROCEDURE EVALUATION
Anesthesia Post Evaluation    Patient: Jane Garcia    Procedure(s) Performed: * No procedures listed *    Final Anesthesia Type: epidural    Patient location during evaluation: floor  Patient participation: Yes- Able to Participate  Level of consciousness: awake and alert, oriented and awake  Post-procedure vital signs: reviewed and stable  Pain management: adequate  Airway patency: patent    PONV status at discharge: No PONV  Anesthetic complications: no      Cardiovascular status: blood pressure returned to baseline, hemodynamically stable and stable  Respiratory status: unassisted, spontaneous ventilation and room air  Hydration status: euvolemic  Follow-up not needed.  Comments: The patient was found to be awake alert and oriented x4 stating that she has ambulated well without difficulty and that her legs feel normal at this time. The patient was able to demonstrate good motor and sensory to her lower extremities at this time. The patient is without headache or back ache at this time. Patient does note some minor insertion site discomfort.  She was informed that some minor insertion site discomfort is normal and expected and should resolve without difficulty. The patient is to use any comfort means needed.  She is to notify the Department of Anesthesiology if discomfort persists worsens or with any questions, problems or concerns.  The patient demonstrates no anesthesia complications at this time.          Vitals Value Taken Time   /70 2/21/2020  4:19 AM   Temp 36.7 °C (98 °F) 2/21/2020  4:19 AM   Pulse 80 2/21/2020  4:19 AM   Resp 20 2/21/2020  4:19 AM   SpO2 100 % 2/21/2020  4:19 AM         No case tracking events are documented in the log.      Pain/Christopher Score: Pain Rating Prior to Med Admin: 5 (2/21/2020  2:34 AM)  Pain Rating Post Med Admin: 0 (2/21/2020  3:30 AM)

## 2020-02-21 NOTE — CARE UPDATE
02/20/20 2128   Patient Assessment/Suction   Level of Consciousness (AVPU) alert   Respiratory Effort Unlabored   Expansion/Accessory Muscles/Retractions no retractions   All Lung Fields Breath Sounds clear;equal bilaterally   Rhythm/Pattern, Respiratory no shortness of breath reported   Cough Frequency no cough   PRE-TX-O2   O2 Device (Oxygen Therapy) room air   SpO2 98 %   Pulse Oximetry Type Intermittent   $ Pulse Oximetry - Multiple Charge Pulse Oximetry - Multiple   Pulse 88   Resp 20   Positioning   Head of Bed (HOB) HOB elevated   Respiratory Evaluation   $ Care Plan Tech Time 15 min

## 2020-02-21 NOTE — DISCHARGE INSTRUCTIONS

## 2020-03-07 ENCOUNTER — CLINICAL SUPPORT (OUTPATIENT)
Dept: URGENT CARE | Facility: CLINIC | Age: 30
End: 2020-03-07
Payer: MEDICAID

## 2020-03-07 VITALS
WEIGHT: 165.81 LBS | DIASTOLIC BLOOD PRESSURE: 90 MMHG | HEART RATE: 93 BPM | RESPIRATION RATE: 20 BRPM | SYSTOLIC BLOOD PRESSURE: 132 MMHG | OXYGEN SATURATION: 97 % | BODY MASS INDEX: 25.97 KG/M2 | TEMPERATURE: 98 F

## 2020-03-07 DIAGNOSIS — N61.0 MASTITIS, RIGHT, ACUTE: Primary | ICD-10-CM

## 2020-03-07 PROCEDURE — 99214 OFFICE O/P EST MOD 30 MIN: CPT | Mod: S$GLB,,, | Performed by: NURSE PRACTITIONER

## 2020-03-07 PROCEDURE — 99214 PR OFFICE/OUTPT VISIT, EST, LEVL IV, 30-39 MIN: ICD-10-PCS | Mod: S$GLB,,, | Performed by: NURSE PRACTITIONER

## 2020-03-07 RX ORDER — DICLOXACILLIN SODIUM 500 MG/1
500 CAPSULE ORAL EVERY 6 HOURS
Qty: 40 CAPSULE | Refills: 0 | Status: SHIPPED | OUTPATIENT
Start: 2020-03-07 | End: 2020-03-17

## 2020-03-07 NOTE — PROGRESS NOTES
Subjective:       Patient ID: Jane Garcia is a 29 y.o. female.    Vitals:  weight is 75.2 kg (165 lb 12.8 oz). Her temperature is 98.3 °F (36.8 °C). Her blood pressure is 132/90 (abnormal) and her pulse is 93. Her respiration is 20 and oxygen saturation is 97%.     Chief Complaint: Mastitis    Pt presents for right breast swelling, tenderness and warmth x 2 days. PT is breastfeeding. Infant is 2 weeks old. Pt reports adequate mild supply. She denies f/c/n/v. She is having pain to her right breast, 7/10 intensity, aching quality.       Constitution: Negative for chills, fatigue and fever.   HENT: Negative for congestion and sore throat.    Neck: Negative for painful lymph nodes.   Cardiovascular: Negative for chest pain and leg swelling.   Eyes: Negative for double vision and blurred vision.   Respiratory: Negative for cough and shortness of breath.    Gastrointestinal: Negative for nausea, vomiting and diarrhea.   Genitourinary: Negative for dysuria, frequency, urgency and history of kidney stones.   Musculoskeletal: Negative for joint pain, joint swelling, muscle cramps and muscle ache.   Skin: Positive for erythema. Negative for color change, pale, rash and bruising.        Warmth and tenderness to right breast   Allergic/Immunologic: Negative for seasonal allergies.   Neurological: Negative for dizziness, history of vertigo, light-headedness, passing out and headaches.   Hematologic/Lymphatic: Negative for swollen lymph nodes.   Psychiatric/Behavioral: Negative for nervous/anxious, sleep disturbance and depression. The patient is not nervous/anxious.        Objective:      Physical Exam   Constitutional: She is oriented to person, place, and time. She appears well-developed and well-nourished. She is cooperative.  Non-toxic appearance. She does not appear ill. No distress.   HENT:   Head: Normocephalic and atraumatic.   Right Ear: Hearing, tympanic membrane, external ear and ear canal normal.   Left Ear:  Hearing, tympanic membrane, external ear and ear canal normal.   Nose: Nose normal. No mucosal edema, rhinorrhea or nasal deformity. No epistaxis. Right sinus exhibits no maxillary sinus tenderness and no frontal sinus tenderness. Left sinus exhibits no maxillary sinus tenderness and no frontal sinus tenderness.   Mouth/Throat: Uvula is midline, oropharynx is clear and moist and mucous membranes are normal. No trismus in the jaw. Normal dentition. No uvula swelling. No posterior oropharyngeal erythema.   Eyes: Conjunctivae and lids are normal. Right eye exhibits no discharge. Left eye exhibits no discharge. No scleral icterus.   Neck: Trachea normal, normal range of motion, full passive range of motion without pain and phonation normal. Neck supple.   Cardiovascular: Normal rate, regular rhythm, normal heart sounds, intact distal pulses and normal pulses.   Pulmonary/Chest: Effort normal and breath sounds normal. No stridor. No respiratory distress. She has no wheezes. She has no rales. She exhibits no tenderness.   Abdominal: Soft. Normal appearance and bowel sounds are normal. She exhibits no distension, no pulsatile midline mass and no mass. There is no tenderness.   Musculoskeletal: Normal range of motion. She exhibits no edema or deformity.   Neurological: She is alert and oriented to person, place, and time. She exhibits normal muscle tone. Coordination normal.   Skin: Skin is warm, dry, intact, not diaphoretic and not pale.   Warmth with mild erythema, tenderness and edema to right outer breast Lesions:  erythema  Psychiatric: She has a normal mood and affect. Her speech is normal and behavior is normal. Judgment and thought content normal. Cognition and memory are normal.   Nursing note and vitals reviewed.        Assessment:       1. Mastitis, right, acute        Plan:         Mastitis, right, acute    Other orders  -     dicloxacillin (DYNAPEN) 500 MG capsule; Take 1 capsule (500 mg total) by mouth every 6  (six) hours. for 10 days  Dispense: 40 capsule; Refill: 0

## 2020-03-07 NOTE — PATIENT INSTRUCTIONS
Mastitis  Mastitis occurs when breast tissue becomes swollen and inflamed. This is almost always due to infection. Mastitis most often affects breastfeeding women during the first 6 weeks after childbirth. For this reason, its also known as lactation mastitis. Infection may happen after a duct becomes clogged, causing milk to back up in the breast. Mastitis may also occur if bacteria enter the breast through small cracks in the nipple. (Less often, mastitis occurs in women who arent breastfeeding. If you have mastitis that is not due to breastfeeding, your healthcare provider will give you more information as needed. Treatment may include some of the same home care measures listed below.)  Mastitis may cause flu-like symptoms such as fever, aches, and fatigue. The affected breast may feel painful, warm, tender, firm, or swollen. The skin over the breast may be red (often in a wedge-shaped pattern). You may feel a burning a sensation when breastfeeding.  In most cases, mastitis can be treated with antibiotics. This should clear the infection. If treatment is delayed, a pocket of pus (abscess) can form in the breast tissue. A procedure may then be needed to drain the pus. In severe cases of infection, other treatments may be needed.  Home care  Breastfeeding  · Its very important to keep the milk flowing from the infected breast. Continue breastfeeding from both breasts as usual. This will not hurt the baby. If this is too painful, use a breast pump to remove milk from the infected side. This can be fed to your baby or discarded. Note: If you don't continue to breastfeed or pump your breast, bacteria can grow in the milk that is left in your breast. This can make your infection worse.  · Tell your healthcare provider if you have problems with breastfeeding. He or she may suggest changes to your technique, if needed. You may also be referred to a lactation nurse or consultant for support with  breastfeeding.  General care  · Take any medicines youre prescribed as directed. If youre taking antibiotics, be sure to complete all of the medicine even if you start to feel better. Over-the-counter pain medicines may also be recommended. Dont use breast creams or other products or medicines without talking to your healthcare provider first. Note: If youre concerned about taking medicines while breastfeeding, talk to your healthcare provider.  · Rest as often as needed. Also be sure to drink plenty of fluids.  · To help relieve pain and swelling, heat or ice may be used. Apply as often as directed by your provider.  ¨ Heat: Place a warm compress on the breast. Use a towel soaked in hot water, a heating pad, or a hot water bottle.  ¨ Cold: Place a cold compress on the breast. Use an ice pack or bag of ice wrapped in a thin towel. Never place a cold source directly on the skin.  Follow-up care  Follow up with your healthcare provider as advised.  When to seek medical advice  Call your healthcare provider right away if any of these occur:  · Fever of 100.4°F (38°C) or higher, or as directed by your provider  · Shaking chills  · Symptoms worsen or do not improve within 48 to 72 hours of starting treatment  · New symptoms develop  Date Last Reviewed: 7/30/2015  © 5154-0378 [employer]. [address] [place] [address], [place] [address]. All rights reserved. This information is not intended as a substitute for professional medical care. Always follow your healthcare professional's instructions.

## 2020-11-22 ENCOUNTER — HOSPITAL ENCOUNTER (EMERGENCY)
Facility: HOSPITAL | Age: 30
Discharge: HOME | End: 2020-11-22
Attending: EMERGENCY MEDICINE
Payer: MEDICAID

## 2020-11-22 VITALS
RESPIRATION RATE: 18 BRPM | OXYGEN SATURATION: 100 % | HEART RATE: 98 BPM | DIASTOLIC BLOOD PRESSURE: 78 MMHG | SYSTOLIC BLOOD PRESSURE: 148 MMHG | BODY MASS INDEX: 29.41 KG/M2 | HEIGHT: 66 IN | WEIGHT: 183 LBS | TEMPERATURE: 98 F

## 2020-11-22 DIAGNOSIS — F41.9 ANXIETY: Primary | ICD-10-CM

## 2020-11-22 DIAGNOSIS — R07.9 CHEST PAIN: ICD-10-CM

## 2020-11-22 LAB
ALBUMIN SERPL BCP-MCNC: 4.4 G/DL (ref 3.5–5.2)
ALP SERPL-CCNC: 86 U/L (ref 55–135)
ALT SERPL W/O P-5'-P-CCNC: 13 U/L (ref 10–44)
ANION GAP SERPL CALC-SCNC: 9 MMOL/L (ref 8–16)
AST SERPL-CCNC: 16 U/L (ref 10–40)
B-HCG UR QL: NEGATIVE
BASOPHILS # BLD AUTO: 0.07 K/UL (ref 0–0.2)
BASOPHILS NFR BLD: 1.1 % (ref 0–1.9)
BILIRUB SERPL-MCNC: 0.6 MG/DL (ref 0.1–1)
BNP SERPL-MCNC: 20 PG/ML (ref 0–99)
BUN SERPL-MCNC: 12 MG/DL (ref 6–20)
CALCIUM SERPL-MCNC: 9.5 MG/DL (ref 8.7–10.5)
CHLORIDE SERPL-SCNC: 109 MMOL/L (ref 95–110)
CO2 SERPL-SCNC: 23 MMOL/L (ref 23–29)
CREAT SERPL-MCNC: 0.7 MG/DL (ref 0.5–1.4)
CTP QC/QA: YES
D DIMER PPP IA.FEU-MCNC: 0.3 UG/ML FEU
DIFFERENTIAL METHOD: ABNORMAL
EOSINOPHIL # BLD AUTO: 0.1 K/UL (ref 0–0.5)
EOSINOPHIL NFR BLD: 1.5 % (ref 0–8)
ERYTHROCYTE [DISTWIDTH] IN BLOOD BY AUTOMATED COUNT: 14.5 % (ref 11.5–14.5)
EST. GFR  (AFRICAN AMERICAN): >60 ML/MIN/1.73 M^2
EST. GFR  (NON AFRICAN AMERICAN): >60 ML/MIN/1.73 M^2
GLUCOSE SERPL-MCNC: 97 MG/DL (ref 70–110)
HCT VFR BLD AUTO: 37.1 % (ref 37–48.5)
HGB BLD-MCNC: 11.3 G/DL (ref 12–16)
IMM GRANULOCYTES # BLD AUTO: 0.02 K/UL (ref 0–0.04)
IMM GRANULOCYTES NFR BLD AUTO: 0.3 % (ref 0–0.5)
INR PPP: 1.3
LYMPHOCYTES # BLD AUTO: 2 K/UL (ref 1–4.8)
LYMPHOCYTES NFR BLD: 31.9 % (ref 18–48)
MCH RBC QN AUTO: 24.5 PG (ref 27–31)
MCHC RBC AUTO-ENTMCNC: 30.5 G/DL (ref 32–36)
MCV RBC AUTO: 81 FL (ref 82–98)
MONOCYTES # BLD AUTO: 0.5 K/UL (ref 0.3–1)
MONOCYTES NFR BLD: 7.8 % (ref 4–15)
NEUTROPHILS # BLD AUTO: 3.5 K/UL (ref 1.8–7.7)
NEUTROPHILS NFR BLD: 57.4 % (ref 38–73)
NRBC BLD-RTO: 0 /100 WBC
PLATELET # BLD AUTO: 488 K/UL (ref 150–350)
PMV BLD AUTO: 9.6 FL (ref 9.2–12.9)
POTASSIUM SERPL-SCNC: 3.8 MMOL/L (ref 3.5–5.1)
PROT SERPL-MCNC: 8 G/DL (ref 6–8.4)
PROTHROMBIN TIME: 15.9 SEC (ref 10.6–14.8)
RBC # BLD AUTO: 4.61 M/UL (ref 4–5.4)
SODIUM SERPL-SCNC: 141 MMOL/L (ref 136–145)
TROPONIN I SERPL DL<=0.01 NG/ML-MCNC: <0.03 NG/ML
WBC # BLD AUTO: 6.14 K/UL (ref 3.9–12.7)

## 2020-11-22 PROCEDURE — 85610 PROTHROMBIN TIME: CPT

## 2020-11-22 PROCEDURE — 99285 EMERGENCY DEPT VISIT HI MDM: CPT | Mod: 25

## 2020-11-22 PROCEDURE — 93010 ELECTROCARDIOGRAM REPORT: CPT | Mod: ,,, | Performed by: INTERNAL MEDICINE

## 2020-11-22 PROCEDURE — 93005 ELECTROCARDIOGRAM TRACING: CPT | Performed by: INTERNAL MEDICINE

## 2020-11-22 PROCEDURE — 81025 URINE PREGNANCY TEST: CPT | Performed by: NURSE PRACTITIONER

## 2020-11-22 PROCEDURE — 84484 ASSAY OF TROPONIN QUANT: CPT

## 2020-11-22 PROCEDURE — 36415 COLL VENOUS BLD VENIPUNCTURE: CPT

## 2020-11-22 PROCEDURE — 80053 COMPREHEN METABOLIC PANEL: CPT

## 2020-11-22 PROCEDURE — 93010 EKG 12-LEAD: ICD-10-PCS | Mod: ,,, | Performed by: INTERNAL MEDICINE

## 2020-11-22 PROCEDURE — 83880 ASSAY OF NATRIURETIC PEPTIDE: CPT

## 2020-11-22 PROCEDURE — 85025 COMPLETE CBC W/AUTO DIFF WBC: CPT

## 2020-11-22 PROCEDURE — 85379 FIBRIN DEGRADATION QUANT: CPT

## 2020-11-22 RX ORDER — CETIRIZINE HYDROCHLORIDE 10 MG/1
10 TABLET ORAL DAILY
COMMUNITY
End: 2022-02-28 | Stop reason: SDUPTHER

## 2020-11-22 RX ORDER — ALBUTEROL SULFATE 90 UG/1
2 AEROSOL, METERED RESPIRATORY (INHALATION) EVERY 6 HOURS PRN
COMMUNITY
End: 2023-01-05 | Stop reason: SDUPTHER

## 2020-11-22 RX ORDER — ALPRAZOLAM 0.5 MG/1
0.5 TABLET ORAL 3 TIMES DAILY PRN
Qty: 4 TABLET | Refills: 0 | Status: SHIPPED | OUTPATIENT
Start: 2020-11-22 | End: 2022-02-28

## 2020-11-22 RX ORDER — ALPRAZOLAM 0.5 MG/1
0.5 TABLET ORAL 3 TIMES DAILY PRN
Qty: 4 TABLET | Refills: 0 | Status: SHIPPED | OUTPATIENT
Start: 2020-11-22 | End: 2020-11-22 | Stop reason: SDUPTHER

## 2020-11-22 NOTE — ED PROVIDER NOTES
"Encounter Date: 11/22/2020       History     Chief Complaint   Patient presents with    Chest Pain     chest tightness to L anterior chest; "feels like heart is racing and skipping beats;" ongoing since Wednesday    Shortness of Breath     while climbing stairs, etc.     Presents with heart racing and SOB  Denies at present  States on Wednesday she was climbing stairs   felt her heart racing and became SOB  Reports no history of anxiety She denies any thing new in her life but states she does have to deal with 2 young children Denies any family history of heart disease at a young age.        Review of patient's allergies indicates:  No Known Allergies  No past medical history on file.  No past surgical history on file.  Family History   Problem Relation Age of Onset    Hypertension Mother     Cancer Maternal Grandmother     Hypertension Maternal Grandmother      Social History     Tobacco Use    Smoking status: Never Smoker   Substance Use Topics    Alcohol use: Not Currently    Drug use: No     Review of Systems   Constitutional: Negative for fever.   Respiratory: Positive for shortness of breath. Negative for cough and wheezing.    Cardiovascular: Positive for palpitations. Negative for chest pain and leg swelling.   Gastrointestinal: Negative for abdominal pain, diarrhea, nausea and vomiting.   Genitourinary: Negative for dysuria.   Musculoskeletal: Negative for back pain.   Skin: Negative for rash.   Neurological: Negative for weakness.       Physical Exam     Initial Vitals [11/22/20 1256]   BP Pulse Resp Temp SpO2   (!) 150/94 97 18 97.6 °F (36.4 °C) 100 %      MAP       --         Physical Exam    Constitutional: She appears well-developed and well-nourished.   HENT:   Head: Normocephalic and atraumatic.   Mouth/Throat: Oropharynx is clear and moist.   Eyes: Conjunctivae are normal.   Neck: Normal range of motion. Neck supple.   Cardiovascular: Normal rate and regular rhythm.   Pulmonary/Chest: Breath " sounds normal. No respiratory distress. She has no wheezes. She has no rhonchi.   Abdominal: Soft. Bowel sounds are normal.   Musculoskeletal: Normal range of motion.   Neurological: She is alert and oriented to person, place, and time. No sensory deficit. GCS score is 15. GCS eye subscore is 4. GCS verbal subscore is 5. GCS motor subscore is 6.   Skin: Skin is warm and dry.   Psychiatric: She has a normal mood and affect. Her behavior is normal. Judgment and thought content normal.         ED Course   Procedures  Labs Reviewed   CBC W/ AUTO DIFFERENTIAL - Abnormal; Notable for the following components:       Result Value    Hemoglobin 11.3 (*)     MCV 81 (*)     MCH 24.5 (*)     MCHC 30.5 (*)     Platelets 488 (*)     All other components within normal limits   PROTIME-INR - Abnormal; Notable for the following components:    PT 15.9 (*)     All other components within normal limits   COMPREHENSIVE METABOLIC PANEL   B-TYPE NATRIURETIC PEPTIDE   TROPONIN I   D DIMER, QUANTITATIVE   D DIMER, QUANTITATIVE   POCT URINE PREGNANCY        ECG Results          EKG 12-lead (In process)  Result time 11/22/20 13:08:12    In process by Interface, Lab In [facility] (11/22/20 13:08:12)                 Narrative:    Test Reason : R07.9,    Vent. Rate : 086 BPM     Atrial Rate : 086 BPM     P-R Int : 130 ms          QRS Dur : 082 ms      QT Int : 354 ms       P-R-T Axes : 068 073 046 degrees     QTc Int : 423 ms    Normal sinus rhythm with sinus arrhythmia  Nonspecific T wave abnormality  Abnormal ECG  No previous ECGs available    Referred By: [email or web address]   SELF           Confirmed By:                             Imaging Results          X-Ray Chest PA And Lateral (Final result)  Result time 11/22/20 13:54:40   Procedure changed from X-Ray Chest AP Portable     Final result by Audi Anderson MD (11/22/20 13:54:40)                 Narrative:    CLINICAL HISTORY:  29 years (1990) Female chest Chest pain / feels like heart  "is  racing    TECHNIQUE:  PA and lateral radiograph of the chest.    COMPARISON:  Most recent radiograph from November 20, 2016    FINDINGS:  The lungs are clear. Costophrenic angles are seen without effusion. No  pneumothorax is identified. The heart is normal in size. The  mediastinum is within normal limits. Osseous structures appear within  normal limits. The visualized upper abdomen is unremarkable.    IMPRESSION:  No acute cardiac or pulmonary process.                  .            Electronically Signed by MCKAYLA Palacios on 11/22/2020 1:55 PM                               Medical Decision Making:   Initial Assessment:   Presents with complaint of palpitations and SOB  Onset Wednesday  Pt denies at present Denies SOB or palpitations today but reports " hyperventilating " prior to coming into the ED while in the parking lot  Differential Diagnosis:   PE   MI  ED Management:  Pt was not given any medication her  She is AAO and active  Playing mostly on her phone while in the ED  She appears in NAD  Will send her home with a small number of xanax.  I have made her an appt.  With [facility]      Dr. Tang in to assess this pt               Attending Attestation:     Physician Attestation Statement for NP/PA:   I have conducted a face to face encounter with this patient in addition to the NP/PA, due to NP/PA Request    Other NP/PA Attestation Additions:    History of Present Illness: Patient labs show no acute abnormalities patient's chest x-ray shows no acute abnormalities patient has a negative D-dimer I have low suspicion for ACS I have low suspicion for PE, patient is to follow up with PCP in the next 2-3 days and is cautioned to return immediately to the emergency department for any worsening or for any further concerns                             Clinical Impression:   Palpitations   Anxiety           ED Disposition Condition    Discharge Stable        ED Prescriptions     Medication Sig " Dispense Start Date End Date Auth. Provider    ALPRAZolam (XANAX) 0.5 MG tablet Take 1 tablet (0.5 mg total) by mouth 3 (three) times daily as needed for Anxiety. 4 tablet 11/22/2020 12/22/2020 Ruby Ferreira NP        Follow-up Information     Follow up With Specialties Details Why Contact Info      In 2 days  Keep your appointment with [facility] health care                                       Ruby Ferreira NP  11/22/20 1600       Roger Tang MD  11/22/20 7945

## 2021-02-17 ENCOUNTER — OFFICE VISIT (OUTPATIENT)
Dept: URGENT CARE | Facility: CLINIC | Age: 31
End: 2021-02-17
Payer: MEDICAID

## 2021-02-17 VITALS
BODY MASS INDEX: 29.66 KG/M2 | SYSTOLIC BLOOD PRESSURE: 122 MMHG | TEMPERATURE: 98 F | RESPIRATION RATE: 16 BRPM | DIASTOLIC BLOOD PRESSURE: 71 MMHG | OXYGEN SATURATION: 98 % | WEIGHT: 189 LBS | HEIGHT: 67 IN | HEART RATE: 67 BPM

## 2021-02-17 DIAGNOSIS — N30.01 ACUTE CYSTITIS WITH HEMATURIA: Primary | ICD-10-CM

## 2021-02-17 DIAGNOSIS — R35.0 URINARY FREQUENCY: ICD-10-CM

## 2021-02-17 LAB
B-HCG UR QL: NEGATIVE
BILIRUB UR QL STRIP: NEGATIVE
CTP QC/QA: YES
GLUCOSE UR QL STRIP: NEGATIVE
KETONES UR QL STRIP: NEGATIVE
LEUKOCYTE ESTERASE UR QL STRIP: NEGATIVE
PH, POC UA: 5
POC BLOOD, URINE: POSITIVE
POC NITRATES, URINE: NEGATIVE
PROT UR QL STRIP: NEGATIVE
SP GR UR STRIP: 1.02 (ref 1–1.03)
UROBILINOGEN UR STRIP-ACNC: ABNORMAL (ref 0.1–1.1)

## 2021-02-17 PROCEDURE — 81025 URINE PREGNANCY TEST: CPT | Mod: S$GLB,,, | Performed by: NURSE PRACTITIONER

## 2021-02-17 PROCEDURE — 81025 PR  URINE PREGNANCY TEST: ICD-10-PCS | Mod: S$GLB,,, | Performed by: NURSE PRACTITIONER

## 2021-02-17 PROCEDURE — 99214 OFFICE O/P EST MOD 30 MIN: CPT | Mod: 25,S$GLB,, | Performed by: NURSE PRACTITIONER

## 2021-02-17 PROCEDURE — 81003 POCT URINALYSIS, DIPSTICK, AUTOMATED, W/O SCOPE: ICD-10-PCS | Mod: QW,S$GLB,, | Performed by: NURSE PRACTITIONER

## 2021-02-17 PROCEDURE — 81003 URINALYSIS AUTO W/O SCOPE: CPT | Mod: QW,S$GLB,, | Performed by: NURSE PRACTITIONER

## 2021-02-17 PROCEDURE — 99214 PR OFFICE/OUTPT VISIT, EST, LEVL IV, 30-39 MIN: ICD-10-PCS | Mod: 25,S$GLB,, | Performed by: NURSE PRACTITIONER

## 2021-02-17 RX ORDER — PHENAZOPYRIDINE HYDROCHLORIDE 200 MG/1
200 TABLET, FILM COATED ORAL 3 TIMES DAILY PRN
Qty: 6 TABLET | Refills: 0 | Status: SHIPPED | OUTPATIENT
Start: 2021-02-17 | End: 2021-02-19

## 2021-02-17 RX ORDER — CEPHALEXIN 500 MG/1
500 CAPSULE ORAL EVERY 12 HOURS
Qty: 14 CAPSULE | Refills: 0 | Status: SHIPPED | OUTPATIENT
Start: 2021-02-17 | End: 2021-02-24

## 2021-02-19 ENCOUNTER — TELEPHONE (OUTPATIENT)
Dept: URGENT CARE | Facility: CLINIC | Age: 31
End: 2021-02-19

## 2021-02-19 LAB
BACTERIA UR CULT: NORMAL
BACTERIA UR CULT: NORMAL

## 2021-10-06 ENCOUNTER — OFFICE VISIT (OUTPATIENT)
Dept: FAMILY MEDICINE | Facility: CLINIC | Age: 31
End: 2021-10-06
Payer: MEDICAID

## 2021-10-06 VITALS
HEIGHT: 67 IN | WEIGHT: 186 LBS | BODY MASS INDEX: 29.19 KG/M2 | HEART RATE: 104 BPM | DIASTOLIC BLOOD PRESSURE: 70 MMHG | OXYGEN SATURATION: 99 % | SYSTOLIC BLOOD PRESSURE: 110 MMHG | TEMPERATURE: 99 F

## 2021-10-06 DIAGNOSIS — Z13.220 LIPID SCREENING: ICD-10-CM

## 2021-10-06 DIAGNOSIS — E66.3 OVERWEIGHT (BMI 25.0-29.9): ICD-10-CM

## 2021-10-06 DIAGNOSIS — J30.1 SEASONAL ALLERGIC RHINITIS DUE TO POLLEN: ICD-10-CM

## 2021-10-06 DIAGNOSIS — Z11.59 NEED FOR HEPATITIS C SCREENING TEST: ICD-10-CM

## 2021-10-06 DIAGNOSIS — R51.9 RECURRENT HEADACHE: Primary | ICD-10-CM

## 2021-10-06 DIAGNOSIS — Z11.4 ENCOUNTER FOR SCREENING FOR HIV: ICD-10-CM

## 2021-10-06 DIAGNOSIS — G43.901 MIGRAINE WITH STATUS MIGRAINOSUS, NOT INTRACTABLE, UNSPECIFIED MIGRAINE TYPE: ICD-10-CM

## 2021-10-06 PROCEDURE — 99203 OFFICE O/P NEW LOW 30 MIN: CPT | Mod: S$PBB,,, | Performed by: NURSE PRACTITIONER

## 2021-10-06 PROCEDURE — 99214 OFFICE O/P EST MOD 30 MIN: CPT | Performed by: NURSE PRACTITIONER

## 2021-10-06 PROCEDURE — 99203 PR OFFICE/OUTPT VISIT, NEW, LEVL III, 30-44 MIN: ICD-10-PCS | Mod: S$PBB,,, | Performed by: NURSE PRACTITIONER

## 2021-10-06 RX ORDER — AZELASTINE 1 MG/ML
1 SPRAY, METERED NASAL 2 TIMES DAILY
Qty: 30 ML | Refills: 2 | Status: SHIPPED | OUTPATIENT
Start: 2021-10-06 | End: 2022-02-28

## 2021-10-06 RX ORDER — SUMATRIPTAN 50 MG/1
TABLET, FILM COATED ORAL
Qty: 15 TABLET | Refills: 2 | Status: SHIPPED | OUTPATIENT
Start: 2021-10-06 | End: 2022-02-28

## 2022-01-12 ENCOUNTER — OFFICE VISIT (OUTPATIENT)
Dept: URGENT CARE | Facility: CLINIC | Age: 32
End: 2022-01-12
Payer: MEDICAID

## 2022-01-12 VITALS
SYSTOLIC BLOOD PRESSURE: 138 MMHG | TEMPERATURE: 98 F | HEIGHT: 67 IN | BODY MASS INDEX: 29.41 KG/M2 | DIASTOLIC BLOOD PRESSURE: 98 MMHG | WEIGHT: 187.38 LBS | RESPIRATION RATE: 16 BRPM | HEART RATE: 103 BPM | OXYGEN SATURATION: 99 %

## 2022-01-12 DIAGNOSIS — R05.9 COUGH: ICD-10-CM

## 2022-01-12 DIAGNOSIS — R09.81 COMPLAINT OF NASAL CONGESTION: ICD-10-CM

## 2022-01-12 DIAGNOSIS — U07.1 COVID-19 VIRUS DETECTED: Primary | ICD-10-CM

## 2022-01-12 DIAGNOSIS — J02.9 SORE THROAT: ICD-10-CM

## 2022-01-12 LAB
CTP QC/QA: YES
SARS-COV-2 AG RESP QL IA.RAPID: POSITIVE

## 2022-01-12 PROCEDURE — 3080F DIAST BP >= 90 MM HG: CPT | Mod: CPTII,S$GLB,, | Performed by: NURSE PRACTITIONER

## 2022-01-12 PROCEDURE — 99214 PR OFFICE/OUTPT VISIT, EST, LEVL IV, 30-39 MIN: ICD-10-PCS | Mod: S$GLB,,, | Performed by: NURSE PRACTITIONER

## 2022-01-12 PROCEDURE — 3075F SYST BP GE 130 - 139MM HG: CPT | Mod: CPTII,S$GLB,, | Performed by: NURSE PRACTITIONER

## 2022-01-12 PROCEDURE — 99214 OFFICE O/P EST MOD 30 MIN: CPT | Mod: S$GLB,,, | Performed by: NURSE PRACTITIONER

## 2022-01-12 PROCEDURE — 1159F PR MEDICATION LIST DOCUMENTED IN MEDICAL RECORD: ICD-10-PCS | Mod: CPTII,S$GLB,, | Performed by: NURSE PRACTITIONER

## 2022-01-12 PROCEDURE — 3008F PR BODY MASS INDEX (BMI) DOCUMENTED: ICD-10-PCS | Mod: CPTII,S$GLB,, | Performed by: NURSE PRACTITIONER

## 2022-01-12 PROCEDURE — 1159F MED LIST DOCD IN RCRD: CPT | Mod: CPTII,S$GLB,, | Performed by: NURSE PRACTITIONER

## 2022-01-12 PROCEDURE — 3075F PR MOST RECENT SYSTOLIC BLOOD PRESS GE 130-139MM HG: ICD-10-PCS | Mod: CPTII,S$GLB,, | Performed by: NURSE PRACTITIONER

## 2022-01-12 PROCEDURE — 87811 SARS-COV-2 COVID19 W/OPTIC: CPT | Mod: S$GLB,,, | Performed by: NURSE PRACTITIONER

## 2022-01-12 PROCEDURE — 3080F PR MOST RECENT DIASTOLIC BLOOD PRESSURE >= 90 MM HG: ICD-10-PCS | Mod: CPTII,S$GLB,, | Performed by: NURSE PRACTITIONER

## 2022-01-12 PROCEDURE — 3008F BODY MASS INDEX DOCD: CPT | Mod: CPTII,S$GLB,, | Performed by: NURSE PRACTITIONER

## 2022-01-12 PROCEDURE — 87811 SARS CORONAVIRUS 2 ANTIGEN POCT, MANUAL READ: ICD-10-PCS | Mod: S$GLB,,, | Performed by: NURSE PRACTITIONER

## 2022-01-12 RX ORDER — IPRATROPIUM BROMIDE 21 UG/1
2 SPRAY, METERED NASAL 2 TIMES DAILY
Qty: 30 ML | Refills: 0 | Status: SHIPPED | OUTPATIENT
Start: 2022-01-12 | End: 2022-02-28

## 2022-01-12 RX ORDER — PROMETHAZINE HYDROCHLORIDE AND DEXTROMETHORPHAN HYDROBROMIDE 6.25; 15 MG/5ML; MG/5ML
5 SYRUP ORAL EVERY 4 HOURS PRN
Qty: 118 ML | Refills: 0 | Status: SHIPPED | OUTPATIENT
Start: 2022-01-12 | End: 2022-01-22

## 2022-01-12 NOTE — PATIENT INSTRUCTIONS
PLEASE READ YOUR DISCHARGE INSTRUCTIONS ENTIRELY AS IT CONTAINS IMPORTANT INFORMATION.    Patient had covid testing done today.  Discussed corona virus precautions and reviewed CDC FAC; printed a copy for patient.  I discussed to continue to monitor their symptoms. Discussed that if their symptoms persist or worsen to seek re-evaluation. Clinic vs. ER precautions were given.  Patient verbalized understanding and agreed with the entire plan of care.    If Negative and no direct exposure: symptom free without fever reducing meds in 24 hours - can go back to work in 24 hours with surgical mask for 10-14 days.    If Positive and significantly symptomatic: improved symptoms, no fever without fever reducing meds for 24 hours- have to be out for a minimum of 10 days passed from + test  with improvements in symptoms. MAY COME OUT OF QUARANTINE ON DAY 11.      If you tested positive and have symptoms, you must isolate for 5 days starting on the day of your first symptoms  OR day of the positive test if you are asymptomatic. After 5 days (ON DAY #6), if your symptoms have improved and you have not had fever on day 5, you can return to the community on day #6- NO TESTING REQUIRED to return to community! If no fever without fever reducing meds for 24 hours, before coming out of quarantine. After your 5 days of isolation are completed, the CDC recommends strict mask use for the first 5 days (day #6-10) that you come out of isolation.  MAY COME OUT OF QUARANTINE ON DAY#6 DATE** BUT CONTINUE STRICT MASKS FOR ANOTHER 5 DAYS. QUARANTINE START DATE**    This is the most important part, both the CDC and the [facility] emphasize that you do not test out of isolation. In fact, we do not retest if you were positive in the last 90 days.           - Reviewed radiographs and all diagnostic testing with patient/family.    - Rest.  Drink plenty of fluids.    - Tylenol OR anti-inflammatory (NSAIDs, ibuprofen, aleve, motrin) as directed as needed  for fever/pain.  For Tylenol, do not exceed 3000 mg/ day. If no contraindication or allergies.    - continue albuterol inhaler as needed for shortness of breath/wheezing  - do not operate machinery when taking cough syrup or pain meds as they may cause drowsiness.  - take Tessalon as needed for cough suppression. Take cough syrup as needed for cough during at night.     - If you were prescribed antibiotics, please take them to completion. Please supplement with OTC probiotics and yogurt.  Contact clinic if develop profuse diarrhea and weakness.  - If you are female and on birth control pills - please use additional methods of contraception to prevent pregnancy while on antibiotics and for one cycle after.     -Below are suggestions for symptomatic relief:              -Salt water gargles to soothe throat pain.              -Chloroseptic spray also helps to numb throat pain.              -Nasal saline spray reduces inflammation and dryness.              -Warm face compresses to help with facial sinus pain/pressure.              -Vicks vapor rub at night.              -Astelin NASAL SPRAY twice day for nasal/sinus congestion   **may also supplement with 2nd OTC nasal spray to help with inflammation and congestion. Wean to off when you nose becomes to dry or bleed. Also use nasal saline twice a day to help with dryness.               -Flonase OTC or Nasacort OTC  once a day for nasal/sinus congestion. DON'T USE IF YOU HAVE GLAUCOMA. CHECK WITH YOUR PHARMACIST/PHYSICIAN.              -Simple foods like chicken noodle soup.              -Mucinex DM (ANY COUGH EXPECTORANT) for cough or chest congestion with mucus during the day time. Delsym or robitussin (ANY COUGH SUPPRESSANT) helps with coughing at night. Mucinex-D if you have chest congestion or sputum (caution if history of high blood pressure or palpitations).              -Zyrtec/Claritin/xyzal during the day time  & Benadryl at night (only if severe runny nose) may  help with allergies and runny nose. Add decongestant if you have nasal/sinus congestion/sinus pressure/ear fullness sensation. (see below)              -may take OTC meclizine as needed for dizziness or nausea.     Caution with use of Decongestant meds:  -If you DO NOT have Hypertension or any history of palpitations, it is ok to take over the counter Sudafed or Mucinex D or Allegra-D or Claritin-D or Zyrtec-D.  -If you do take one of the above, it is ok to combine that with plain over the counter Mucinex or Allegra or Claritin or Zyrtec. If, for example, you are taking Zyrtec -D, you can combine that with Mucinex, but not Mucinex-D.  If you are taking Mucinex-D, you can combine that with plain Allegra or Claritin or Zyrtec.     -Do not combine pseudophed or phenylephrine with any other brand allergy-D for DECONGESTANT.   -Or vice versa, you can you take plain allergy medications (allegra/claritin/zyrtec with NO Decongestant) and ADD OTC pseudophed or phenelyphrine 2-3 times a day. Avoid taking decongestant late at night or with caffeine as it can keep you up or cause jittery feeling.    -If you DO have Hypertension , anxiety, or palpitations, it is safe to take Coricidin HBP for relief of sinus symptoms.      For your GI symptoms:  -Use gatorade/pedialyte or rehydration packets to help stay hydrated. Vitamin water and plain water do not contain rehydrating electrolytes.  -Increase clear liquids (water, gatorade, pedialyte, broths, jello, etc) Hold off on solids for 12-18 hours. Then advance to BRAT diet (banana, rice, applesauce, tea, toast/crackers), then advance further as tolerated. Avoid spicy or fatty foods.   -May take Emitrol OTC as needed for nausea.   -Use Peptobismol or Immodium to help alleviate your diarrhea symptoms.   -Take mylanta or simethicone for bloating or gas pain.   -Take pepcid or omeprazole if you have heartburn or reflux sensation.  -Avoid imodium unless you have more than 6 loose stools in  24 hours. Take 1 dose and monitor to see if you can repeat AS IT WILL CAUSE CONSTIPATION.  -Wash hands frequently while sick. Avoid ibuprofen or other NSAIDS until you are well.   -Please go to the ER if you experience worsening abdominal pain, blood in your vomit or stool, high fever, dizziness, fainting, swelling of your abdomen, inability to pass gas or stool, or inability to urinate.         -You must understand that you've received an Urgent Care treatment only and that you may be released before all your medical problems are known or treated. You, the patient, will arrange for follow up care as instructed. Please arrange follow up with your primary medical clinic within 2-5 days if your signs and symptoms have not resolved or worsen.     - Follow up with your PCP or specialty clinic as directed.  You can call [phone] or [phone] to schedule an appointment with the appropriate provider.  Schedule [facility] is open Mon-Friday 8-5pm (excluded holidays).    - If your condition worsens or fails to improve we recommend that you receive another evaluation at the emergency room immediately or contact your primary medical clinic to discuss your concerns.            Prevention steps for patients with confirmed or suspected COVID-19   Stay home and stay away from family members and friends. The CDC says, you can leave home after these three things have happened: 1) You have had no fever for at least 24 hours (that is one full day of no fever without the use of medicine that reduces fevers) 2) AND other symptoms have improved (for example, when your cough or shortness of breath have improved) 3) AND at least 10 days have passed since your symptoms first appeared OR after 7-10 days passed from first positive test.   Separate yourself from other people and animals in your home.   Call ahead before visiting your doctor.   Wear a facemask.   Cover your coughs and sneezes.   Wash your hands often with soap and  water; hand  can be used, too.   Avoid sharing personal household items.   Wipe down surfaces used daily.   Monitor your symptoms. Seek prompt medical attention if your illness is worsening (e.g., difficulty breathing).    Before seeking care, call your healthcare provider.   If you have a medical emergency and need to call 911, notify the dispatch personnel that you have, or are being evaluated for COVID-19. If possible, put on a facemask before emergency medical services arrive.        Recommended precautions for household members, intimate partners, and caregivers in a home setting of a patient with symptomatic laboratory-confirmed COVID-19 or a patient under investigation.  Household members, intimate partners, and caregivers in the home setting awaiting tests results have close contact with a person with symptomatic, laboratory-confirmed COVID-19 or a person under investigation. Close contacts should monitor their health; they should call their provider right away if they develop symptoms suggestive of COVID-19 (e.g., fever, cough, shortness of breath).    Close contacts should also follow these recommendations:   Make sure that you understand and can help the patient follow their provider's instructions for medication(s) and care. You should help the patient with basic needs in the home and provide support for getting groceries, prescriptions, and other personal needs.   Monitor the patient's symptoms. If the patient is getting sicker, call his or her healthcare provider and tell them that the patient has laboratory-confirmed COVID-19. If the patient has a medical emergency and you need to call 911, notify the dispatch personnel that the patient has, or is being evaluated for COVID-19.   Household members should stay in another room or be  from the patient. Household members should use a separate bedroom and bathroom, if available.   Prohibit visitors.   Household members should  care for any pets in the home.   Make sure that shared spaces in the home have good air flow, such as by an air conditioner or an opened window, weather permitting.   Perform hand hygiene frequently. Wash your hands often with soap and water for at least 20 seconds or use an alcohol-based hand  (that contains > 60% alcohol) covering all surfaces of your hands and rubbing them together until they feel dry. Soap and water should be used preferentially.   Avoid touching your eyes, nose, and mouth.   The patient should wear a facemask. If the patient is not able to wear a facemask (for example, because it causes trouble breathing), caregivers should wear a mask when they are in the same room as the patient.   Wear a disposable facemask and gloves when you touch or have contact with the patient's blood, stool, or body fluids, such as saliva, sputum, nasal mucus, vomit, urine.  o Throw out disposable facemasks and gloves after using them. Do not reuse.  o When removing personal protective equipment, first remove and dispose of gloves. Then, immediately clean your hands with soap and water or alcohol-based hand . Next, remove and dispose of facemask, and immediately clean your hands again with soap and water or alcohol-based hand .   You should not share dishes, drinking glasses, cups, eating utensils, towels, bedding, or other items with the patient. After the patient uses these items, you should wash them thoroughly (see below Wash laundry thoroughly).   Clean all high-touch surfaces, such as counters, tabletops, doorknobs, bathroom fixtures, toilets, phones, keyboards, tablets, and bedside tables, every day. Also, clean any surfaces that may have blood, stool, or body fluids on them.   Use a household cleaning spray or wipe, according to the label instructions. Labels contain instructions for safe and effective use of the cleaning product including precautions you should take when  applying the product, such as wearing gloves and making sure you have good ventilation during use of the product.   Wash laundry thoroughly.  o Immediately remove and wash clothes or bedding that have blood, stool, or body fluids on them.  o Wear disposable gloves while handling soiled items and keep soiled items away from your body. Clean your hands (with soap and water or an alcohol-based hand ) immediately after removing your gloves.  o Read and follow directions on labels of laundry or clothing items and detergent. In general, using a normal laundry detergent according to washing machine instructions and dry thoroughly using the warmest temperatures recommended on the clothing label.   Place all used disposable gloves, facemasks, and other contaminated items in a lined container before disposing of them with other household waste. Clean your hands (with soap and water or an alcohol-based hand ) immediately after handling these items. Soap and water should be used preferentially if hands are visibly dirty.   Discuss any additional questions with your state or local health department or healthcare provider. Check available hours when contacting your local health department.    For more information see CDC link below.      https://www.cdc.gov/coronavirus/2019-ncov/hcp/guidance-prevent-spread.html#precautions        Sources:  [facility], [facility]          Instructions for Home Care of Patients and Caretakers with Coronavirus Disease 2019   Limit visitors to the home.  Older persons and those that have chronic medical conditions such as diabetes, lung and heart disease are at increased risk for illness.    If possible, patients should use a separate bedroom while recovering. Caregivers and household members should avoid prolonged contact with the patient which means to stay 6 feet away and avoid contact with cough droplets.  When close contact is necessary, wash  your hands before and immediately after contact.    Perform hand hygiene frequently. Wash your hands often with soap and water for at least 20 seconds or use an alcohol-based hand , covering all surfaces of your hands and rubbing them together until they feel dry.    Avoid touching your eyes, nose, and mouth with unwashed hands.   Avoid sharing household items with the patient. You should not share dishes, drinking glasses, cups, eating utensils, towels, bedding, or other items. After the patient uses these items, you should wash them thoroughly.   Wash laundry thoroughly.   o Immediately remove and wash clothes or bedding that have blood, stool, or body fluids on them.   Clean all high-touch surfaces, such as counters, tabletops, doorknobs, bathroom fixtures, toilets, phones, keyboards, tablets, and bedside tables, every day.   o Use a household cleaning spray or wipe, according to the label instructions. Labels contain instructions for safe and effective use of the cleaning product including precautions you should take when applying the product, such as wearing gloves and making sure you have good ventilation during use of the product.    For more information see CDC link below.      https://www.cdc.gov/coronavirus/2019-ncov/hcp/guidance-prevent-spread.html#precautions               If your symptoms worsen or if you have any other concerns, please contact [facility] at [phone].

## 2022-01-12 NOTE — PROGRESS NOTES
"Subjective:       Patient ID: Jane Garcia is a 31 y.o. female.    Vitals:  height is 5' 7" (1.702 m) and weight is 85 kg (187 lb 6.4 oz). Her oral temperature is 97.9 °F (36.6 °C). Her blood pressure is 138/98 (abnormal) and her pulse is 103. Her respiration is 16 and oxygen saturation is 99%.     Chief Complaint: COVID-19 Concerns    Pt states "Sore throat, cough, congestion x's 4 days."      HENT: Positive for congestion and sore throat.    Respiratory: Positive for cough.          Objective:      Physical Exam   Constitutional: She is oriented to person, place, and time. She appears well-developed and well-nourished. She is cooperative.  Non-toxic appearance. She does not have a sickly appearance. She does not appear ill. No distress.   HENT:   Head: Normocephalic and atraumatic.   Ears:   Right Ear: Hearing, tympanic membrane, external ear and ear canal normal.   Left Ear: Hearing, tympanic membrane, external ear and ear canal normal.   Nose: Nose normal. No mucosal edema, rhinorrhea or nasal deformity. No epistaxis. Right sinus exhibits no maxillary sinus tenderness and no frontal sinus tenderness. Left sinus exhibits no maxillary sinus tenderness and no frontal sinus tenderness.   Mouth/Throat: Uvula is midline, oropharynx is clear and moist and mucous membranes are normal. No trismus in the jaw. Normal dentition. No uvula swelling. No oropharyngeal exudate, posterior oropharyngeal edema or posterior oropharyngeal erythema.   Eyes: Conjunctivae and lids are normal. No scleral icterus.      extraocular movement intact   Neck: Trachea normal and phonation normal. Neck supple. No edema present. No erythema present. No neck rigidity present.   Cardiovascular: Normal rate, regular rhythm, normal heart sounds, intact distal pulses and normal pulses.   Pulmonary/Chest: Effort normal and breath sounds normal. No stridor. No respiratory distress. She has no decreased breath sounds. She has no wheezes. She has no " rhonchi. She has no rales. She exhibits no tenderness.    Comments: No acute respiratory distress. Able to speak in full sentences without difficulty or pause    Abdominal: Normal appearance.   Musculoskeletal: Normal range of motion.         General: No deformity or edema. Normal range of motion.   Neurological: no focal deficit. She is alert and oriented to person, place, and time. She exhibits normal muscle tone. Coordination normal.   Skin: Skin is warm, dry, intact, not diaphoretic and not pale.   Psychiatric: She has a normal mood and affect. Her speech is normal and behavior is normal. Judgment and thought content normal. Cognition and memory  Nursing note and vitals reviewed.         Results for orders placed or performed in visit on 01/12/22   SARS Coronavirus 2 Antigen, POCT Manual Read   Result Value Ref Range    SARS Coronavirus 2 Antigen Positive (A) Negative     Acceptable Yes      Assessment:       1. COVID-19 virus detected    2. Sore throat    3. Cough    4. Complaint of nasal congestion          Plan:       Covid positive   COVID-19 virus detected    Sore throat  -     SARS Coronavirus 2 Antigen, POCT Manual Read  -     (Magic mouthwash) 1:1:1 diphenhydramine(Benadryl) 12.5mg/5ml liq, aluminum & magnesium hydroxide-simethicone (Maalox), LIDOcaine viscous 2%; Swish and spit 5 mLs every 4 (four) hours as needed (sore throat). for mouth sores  Dispense: 90 mL; Refill: 0    Cough  -     SARS Coronavirus 2 Antigen, POCT Manual Read  -     promethazine-dextromethorphan (PROMETHAZINE-DM) 6.25-15 mg/5 mL Syrp; Take 5 mLs by mouth every 4 (four) hours as needed (cough).  Dispense: 118 mL; Refill: 0    Complaint of nasal congestion  -     SARS Coronavirus 2 Antigen, POCT Manual Read  -     ipratropium (ATROVENT) 21 mcg (0.03 %) nasal spray; 2 sprays by Nasal route 2 (two) times daily.  Dispense: 30 mL; Refill: 0         Patient Instructions     PLEASE READ YOUR DISCHARGE INSTRUCTIONS ENTIRELY  AS IT CONTAINS IMPORTANT INFORMATION.    Patient had covid testing done today.  Discussed corona virus precautions and reviewed CDC FAC; printed a copy for patient.  I discussed to continue to monitor their symptoms. Discussed that if their symptoms persist or worsen to seek re-evaluation. Clinic vs. ER precautions were given.  Patient verbalized understanding and agreed with the entire plan of care.    If Negative and no direct exposure: symptom free without fever reducing meds in 24 hours - can go back to work in 24 hours with surgical mask for 10-14 days.    If Positive and significantly symptomatic: improved symptoms, no fever without fever reducing meds for 24 hours- have to be out for a minimum of 10 days passed from + test  with improvements in symptoms. MAY COME OUT OF QUARANTINE ON DAY 11.      If you tested positive and have symptoms, you must isolate for 5 days starting on the day of your first symptoms  OR day of the positive test if you are asymptomatic. After 5 days (ON DAY #6), if your symptoms have improved and you have not had fever on day 5, you can return to the community on day #6- NO TESTING REQUIRED to return to community! If no fever without fever reducing meds for 24 hours, before coming out of quarantine. After your 5 days of isolation are completed, the CDC recommends strict mask use for the first 5 days (day #6-10) that you come out of isolation.  MAY COME OUT OF QUARANTINE ON DAY#6 DATE** BUT CONTINUE STRICT MASKS FOR ANOTHER 5 DAYS. QUARANTINE START DATE**    This is the most important part, both the CDC and the [facility] emphasize that you do not test out of isolation. In fact, we do not retest if you were positive in the last 90 days.           - Reviewed radiographs and all diagnostic testing with patient/family.    - Rest.  Drink plenty of fluids.    - Tylenol OR anti-inflammatory (NSAIDs, ibuprofen, aleve, motrin) as directed as needed for fever/pain.  For Tylenol, do not exceed 3000 mg/  day. If no contraindication or allergies.    - continue albuterol inhaler as needed for shortness of breath/wheezing  - do not operate machinery when taking cough syrup or pain meds as they may cause drowsiness.  - take Tessalon as needed for cough suppression. Take cough syrup as needed for cough during at night.     - If you were prescribed antibiotics, please take them to completion. Please supplement with OTC probiotics and yogurt.  Contact clinic if develop profuse diarrhea and weakness.  - If you are female and on birth control pills - please use additional methods of contraception to prevent pregnancy while on antibiotics and for one cycle after.     -Below are suggestions for symptomatic relief:              -Salt water gargles to soothe throat pain.              -Chloroseptic spray also helps to numb throat pain.              -Nasal saline spray reduces inflammation and dryness.              -Warm face compresses to help with facial sinus pain/pressure.              -Vicks vapor rub at night.              -Astelin NASAL SPRAY twice day for nasal/sinus congestion   **may also supplement with 2nd OTC nasal spray to help with inflammation and congestion. Wean to off when you nose becomes to dry or bleed. Also use nasal saline twice a day to help with dryness.               -Flonase OTC or Nasacort OTC  once a day for nasal/sinus congestion. DON'T USE IF YOU HAVE GLAUCOMA. CHECK WITH YOUR PHARMACIST/PHYSICIAN.              -Simple foods like chicken noodle soup.              -Mucinex DM (ANY COUGH EXPECTORANT) for cough or chest congestion with mucus during the day time. Delsym or robitussin (ANY COUGH SUPPRESSANT) helps with coughing at night. Mucinex-D if you have chest congestion or sputum (caution if history of high blood pressure or palpitations).              -Zyrtec/Claritin/xyzal during the day time  & Benadryl at night (only if severe runny nose) may help with allergies and runny nose. Add decongestant  if you have nasal/sinus congestion/sinus pressure/ear fullness sensation. (see below)              -may take OTC meclizine as needed for dizziness or nausea.     Caution with use of Decongestant meds:  -If you DO NOT have Hypertension or any history of palpitations, it is ok to take over the counter Sudafed or Mucinex D or Allegra-D or Claritin-D or Zyrtec-D.  -If you do take one of the above, it is ok to combine that with plain over the counter Mucinex or Allegra or Claritin or Zyrtec. If, for example, you are taking Zyrtec -D, you can combine that with Mucinex, but not Mucinex-D.  If you are taking Mucinex-D, you can combine that with plain Allegra or Claritin or Zyrtec.     -Do not combine pseudophed or phenylephrine with any other brand allergy-D for DECONGESTANT.   -Or vice versa, you can you take plain allergy medications (allegra/claritin/zyrtec with NO Decongestant) and ADD OTC pseudophed or phenelyphrine 2-3 times a day. Avoid taking decongestant late at night or with caffeine as it can keep you up or cause jittery feeling.    -If you DO have Hypertension , anxiety, or palpitations, it is safe to take Coricidin HBP for relief of sinus symptoms.      For your GI symptoms:  -Use gatorade/pedialyte or rehydration packets to help stay hydrated. Vitamin water and plain water do not contain rehydrating electrolytes.  -Increase clear liquids (water, gatorade, pedialyte, broths, jello, etc) Hold off on solids for 12-18 hours. Then advance to BRAT diet (banana, rice, applesauce, tea, toast/crackers), then advance further as tolerated. Avoid spicy or fatty foods.   -May take Emitrol OTC as needed for nausea.   -Use Peptobismol or Immodium to help alleviate your diarrhea symptoms.   -Take mylanta or simethicone for bloating or gas pain.   -Take pepcid or omeprazole if you have heartburn or reflux sensation.  -Avoid imodium unless you have more than 6 loose stools in 24 hours. Take 1 dose and monitor to see if you can  repeat AS IT WILL CAUSE CONSTIPATION.  -Wash hands frequently while sick. Avoid ibuprofen or other NSAIDS until you are well.   -Please go to the ER if you experience worsening abdominal pain, blood in your vomit or stool, high fever, dizziness, fainting, swelling of your abdomen, inability to pass gas or stool, or inability to urinate.         -You must understand that you've received an Urgent Care treatment only and that you may be released before all your medical problems are known or treated. You, the patient, will arrange for follow up care as instructed. Please arrange follow up with your primary medical clinic within 2-5 days if your signs and symptoms have not resolved or worsen.     - Follow up with your PCP or specialty clinic as directed.  You can call [phone] or [phone] to schedule an appointment with the appropriate provider.  Schedule [facility] is open Mon-Friday 8-5pm (excluded holidays).    - If your condition worsens or fails to improve we recommend that you receive another evaluation at the emergency room immediately or contact your primary medical clinic to discuss your concerns.            Prevention steps for patients with confirmed or suspected COVID-19   Stay home and stay away from family members and friends. The CDC says, you can leave home after these three things have happened: 1) You have had no fever for at least 24 hours (that is one full day of no fever without the use of medicine that reduces fevers) 2) AND other symptoms have improved (for example, when your cough or shortness of breath have improved) 3) AND at least 10 days have passed since your symptoms first appeared OR after 7-10 days passed from first positive test.   Separate yourself from other people and animals in your home.   Call ahead before visiting your doctor.   Wear a facemask.   Cover your coughs and sneezes.   Wash your hands often with soap and water; hand  can be used, too.   Avoid  sharing personal household items.   Wipe down surfaces used daily.   Monitor your symptoms. Seek prompt medical attention if your illness is worsening (e.g., difficulty breathing).    Before seeking care, call your healthcare provider.   If you have a medical emergency and need to call 911, notify the dispatch personnel that you have, or are being evaluated for COVID-19. If possible, put on a facemask before emergency medical services arrive.        Recommended precautions for household members, intimate partners, and caregivers in a home setting of a patient with symptomatic laboratory-confirmed COVID-19 or a patient under investigation.  Household members, intimate partners, and caregivers in the home setting awaiting tests results have close contact with a person with symptomatic, laboratory-confirmed COVID-19 or a person under investigation. Close contacts should monitor their health; they should call their provider right away if they develop symptoms suggestive of COVID-19 (e.g., fever, cough, shortness of breath).    Close contacts should also follow these recommendations:   Make sure that you understand and can help the patient follow their provider's instructions for medication(s) and care. You should help the patient with basic needs in the home and provide support for getting groceries, prescriptions, and other personal needs.   Monitor the patient's symptoms. If the patient is getting sicker, call his or her healthcare provider and tell them that the patient has laboratory-confirmed COVID-19. If the patient has a medical emergency and you need to call 911, notify the dispatch personnel that the patient has, or is being evaluated for COVID-19.   Household members should stay in another room or be  from the patient. Household members should use a separate bedroom and bathroom, if available.   Prohibit visitors.   Household members should care for any pets in the home.   Make sure that  shared spaces in the home have good air flow, such as by an air conditioner or an opened window, weather permitting.   Perform hand hygiene frequently. Wash your hands often with soap and water for at least 20 seconds or use an alcohol-based hand  (that contains > 60% alcohol) covering all surfaces of your hands and rubbing them together until they feel dry. Soap and water should be used preferentially.   Avoid touching your eyes, nose, and mouth.   The patient should wear a facemask. If the patient is not able to wear a facemask (for example, because it causes trouble breathing), caregivers should wear a mask when they are in the same room as the patient.   Wear a disposable facemask and gloves when you touch or have contact with the patient's blood, stool, or body fluids, such as saliva, sputum, nasal mucus, vomit, urine.  o Throw out disposable facemasks and gloves after using them. Do not reuse.  o When removing personal protective equipment, first remove and dispose of gloves. Then, immediately clean your hands with soap and water or alcohol-based hand . Next, remove and dispose of facemask, and immediately clean your hands again with soap and water or alcohol-based hand .   You should not share dishes, drinking glasses, cups, eating utensils, towels, bedding, or other items with the patient. After the patient uses these items, you should wash them thoroughly (see below Wash laundry thoroughly).   Clean all high-touch surfaces, such as counters, tabletops, doorknobs, bathroom fixtures, toilets, phones, keyboards, tablets, and bedside tables, every day. Also, clean any surfaces that may have blood, stool, or body fluids on them.   Use a household cleaning spray or wipe, according to the label instructions. Labels contain instructions for safe and effective use of the cleaning product including precautions you should take when applying the product, such as wearing gloves and  making sure you have good ventilation during use of the product.   Wash laundry thoroughly.  o Immediately remove and wash clothes or bedding that have blood, stool, or body fluids on them.  o Wear disposable gloves while handling soiled items and keep soiled items away from your body. Clean your hands (with soap and water or an alcohol-based hand ) immediately after removing your gloves.  o Read and follow directions on labels of laundry or clothing items and detergent. In general, using a normal laundry detergent according to washing machine instructions and dry thoroughly using the warmest temperatures recommended on the clothing label.   Place all used disposable gloves, facemasks, and other contaminated items in a lined container before disposing of them with other household waste. Clean your hands (with soap and water or an alcohol-based hand ) immediately after handling these items. Soap and water should be used preferentially if hands are visibly dirty.   Discuss any additional questions with your state or local health department or healthcare provider. Check available hours when contacting your local health department.    For more information see CDC link below.      https://www.cdc.gov/coronavirus/2019-ncov/hcp/guidance-prevent-spread.html#precautions        Sources:  [facility], [place] [facility]          Instructions for Home Care of Patients and Caretakers with Coronavirus Disease 2019   Limit visitors to the home.  Older persons and those that have chronic medical conditions such as diabetes, lung and heart disease are at increased risk for illness.    If possible, patients should use a separate bedroom while recovering. Caregivers and household members should avoid prolonged contact with the patient which means to stay 6 feet away and avoid contact with cough droplets.  When close contact is necessary, wash your hands before and immediately after contact.     Perform hand hygiene frequently. Wash your hands often with soap and water for at least 20 seconds or use an alcohol-based hand , covering all surfaces of your hands and rubbing them together until they feel dry.    Avoid touching your eyes, nose, and mouth with unwashed hands.   Avoid sharing household items with the patient. You should not share dishes, drinking glasses, cups, eating utensils, towels, bedding, or other items. After the patient uses these items, you should wash them thoroughly.   Wash laundry thoroughly.   o Immediately remove and wash clothes or bedding that have blood, stool, or body fluids on them.   Clean all high-touch surfaces, such as counters, tabletops, doorknobs, bathroom fixtures, toilets, phones, keyboards, tablets, and bedside tables, every day.   o Use a household cleaning spray or wipe, according to the label instructions. Labels contain instructions for safe and effective use of the cleaning product including precautions you should take when applying the product, such as wearing gloves and making sure you have good ventilation during use of the product.    For more information see CDC link below.      https://www.cdc.gov/coronavirus/2019-ncov/hcp/guidance-prevent-spread.html#precautions               If your symptoms worsen or if you have any other concerns, please contact [facility] at [phone].

## 2022-02-28 ENCOUNTER — OFFICE VISIT (OUTPATIENT)
Dept: FAMILY MEDICINE | Facility: CLINIC | Age: 32
End: 2022-02-28
Payer: MEDICAID

## 2022-02-28 ENCOUNTER — HOSPITAL ENCOUNTER (OUTPATIENT)
Dept: RADIOLOGY | Facility: HOSPITAL | Age: 32
Discharge: HOME | End: 2022-02-28
Attending: NURSE PRACTITIONER
Payer: MEDICAID

## 2022-02-28 VITALS
DIASTOLIC BLOOD PRESSURE: 88 MMHG | WEIGHT: 191 LBS | SYSTOLIC BLOOD PRESSURE: 136 MMHG | HEART RATE: 98 BPM | TEMPERATURE: 99 F | HEIGHT: 67 IN | OXYGEN SATURATION: 100 % | BODY MASS INDEX: 29.98 KG/M2

## 2022-02-28 DIAGNOSIS — J30.9 ALLERGIC RHINITIS, UNSPECIFIED SEASONALITY, UNSPECIFIED TRIGGER: ICD-10-CM

## 2022-02-28 DIAGNOSIS — R09.82 POSTNASAL DRIP: ICD-10-CM

## 2022-02-28 DIAGNOSIS — H66.001 NON-RECURRENT ACUTE SUPPURATIVE OTITIS MEDIA OF RIGHT EAR WITHOUT SPONTANEOUS RUPTURE OF TYMPANIC MEMBRANE: Primary | ICD-10-CM

## 2022-02-28 DIAGNOSIS — R05.3 PERSISTENT COUGH FOR 3 WEEKS OR LONGER: ICD-10-CM

## 2022-02-28 PROBLEM — Z34.90 ENCOUNTER FOR INDUCTION OF LABOR: Status: RESOLVED | Noted: 2020-02-19 | Resolved: 2022-02-28

## 2022-02-28 PROCEDURE — 3008F BODY MASS INDEX DOCD: CPT | Mod: ,,, | Performed by: NURSE PRACTITIONER

## 2022-02-28 PROCEDURE — 99214 OFFICE O/P EST MOD 30 MIN: CPT | Mod: 25 | Performed by: NURSE PRACTITIONER

## 2022-02-28 PROCEDURE — 3079F DIAST BP 80-89 MM HG: CPT | Mod: ,,, | Performed by: NURSE PRACTITIONER

## 2022-02-28 PROCEDURE — 1160F PR REVIEW ALL MEDS BY PRESCRIBER/CLIN PHARMACIST DOCUMENTED: ICD-10-PCS | Mod: ,,, | Performed by: NURSE PRACTITIONER

## 2022-02-28 PROCEDURE — 71046 X-RAY EXAM CHEST 2 VIEWS: CPT | Mod: TC,PO

## 2022-02-28 PROCEDURE — 99213 OFFICE O/P EST LOW 20 MIN: CPT | Mod: S$PBB,,, | Performed by: NURSE PRACTITIONER

## 2022-02-28 PROCEDURE — 3008F PR BODY MASS INDEX (BMI) DOCUMENTED: ICD-10-PCS | Mod: ,,, | Performed by: NURSE PRACTITIONER

## 2022-02-28 PROCEDURE — 99213 PR OFFICE/OUTPT VISIT, EST, LEVL III, 20-29 MIN: ICD-10-PCS | Mod: S$PBB,,, | Performed by: NURSE PRACTITIONER

## 2022-02-28 PROCEDURE — 1160F RVW MEDS BY RX/DR IN RCRD: CPT | Mod: ,,, | Performed by: NURSE PRACTITIONER

## 2022-02-28 PROCEDURE — 3079F PR MOST RECENT DIASTOLIC BLOOD PRESSURE 80-89 MM HG: ICD-10-PCS | Mod: ,,, | Performed by: NURSE PRACTITIONER

## 2022-02-28 PROCEDURE — 3075F PR MOST RECENT SYSTOLIC BLOOD PRESS GE 130-139MM HG: ICD-10-PCS | Mod: ,,, | Performed by: NURSE PRACTITIONER

## 2022-02-28 PROCEDURE — 3075F SYST BP GE 130 - 139MM HG: CPT | Mod: ,,, | Performed by: NURSE PRACTITIONER

## 2022-02-28 RX ORDER — BENZONATATE 100 MG/1
CAPSULE ORAL
COMMUNITY
Start: 2022-02-03 | End: 2023-01-05

## 2022-02-28 RX ORDER — FLUTICASONE PROPIONATE 50 MCG
2 SPRAY, SUSPENSION (ML) NASAL DAILY
Qty: 15.8 ML | Refills: 0 | Status: SHIPPED | OUTPATIENT
Start: 2022-02-28

## 2022-02-28 RX ORDER — CETIRIZINE HYDROCHLORIDE 10 MG/1
10 TABLET ORAL NIGHTLY
Qty: 90 TABLET | Refills: 1 | Status: SHIPPED | OUTPATIENT
Start: 2022-02-28 | End: 2023-01-05

## 2022-02-28 RX ORDER — AMOXICILLIN 500 MG/1
500 CAPSULE ORAL EVERY 8 HOURS
Qty: 21 CAPSULE | Refills: 0 | Status: SHIPPED | OUTPATIENT
Start: 2022-02-28 | End: 2022-03-07

## 2022-02-28 NOTE — PROGRESS NOTES
"    SUBJECTIVE:      Patient ID: Jane Gracia is a 31 y.o. female.    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 She was diagnosed with COVID in mid-January and reports the cough has persisted since that time. All other symptoms have improved. She reports posttussive SOB and posttussive emesis at times. She reports the cough is worse at night and often nonproductive. She reports feeling as though she has something in her throat or something that "tickles" her throat. She has been using OTC cough suppressant, prescription cough suppressant, albuterol inhaler, and rest with only mild relief reported. She also reports feeling like her nose is running constantly. Denies CP, wheezing, fevers, nausea, vomiting, diarrhea, constipation, numbness, weakness, dizziness, palpitations, or any other concerns at this time.    Cough  This is a chronic problem. The current episode started more than 1 month ago. The problem has been unchanged. The problem occurs every few minutes. The cough is non-productive. Associated symptoms include postnasal drip, rhinorrhea and shortness of breath (during episodes of coughing). Pertinent negatives include no chest pain, chills, ear congestion, ear pain, fever, headaches, heartburn, hemoptysis, myalgias, nasal congestion, rash, sore throat, sweats, weight loss or wheezing. The symptoms are aggravated by cold air and lying down. She has tried OTC cough suppressant, a beta-agonist inhaler, prescription cough suppressant and rest for the symptoms. The treatment provided mild relief. There is no history of asthma, bronchiectasis, bronchitis, COPD, emphysema, environmental allergies or pneumonia.       Past Surgical History:   Procedure Laterality Date    HERNIA REPAIR       Family History   Problem Relation Age of Onset    Hypertension Mother     Cancer Maternal Grandmother     Hypertension Maternal Grandmother     Hypertension " Father     Hypertension Maternal Aunt     Hypertension Maternal Uncle     No Known Problems Paternal Uncle     Heart disease Maternal Grandfather       Social History     Socioeconomic History    Marital status:    Tobacco Use    Smoking status: Never Smoker    Smokeless tobacco: Never Used   Substance and Sexual Activity    Alcohol use: Not Currently    Drug use: No    Sexual activity: Yes     Partners: Male   Social History Narrative    ** Merged History Encounter **          Current Outpatient Medications   Medication Sig Dispense Refill    albuterol (PROVENTIL/VENTOLIN HFA) 90 mcg/actuation inhaler Inhale 2 puffs into the lungs every 6 (six) hours as needed for Wheezing. Rescue She states her mom gave her this inhaler      benzonatate (TESSALON) 100 MG capsule TAKE 1 CAPSULE BY MOUTH THREE TIMES DAILY FOR 10 DAYS AS NEEDED FOR COUGH      cetirizine (ZYRTEC) 10 MG tablet Take 1 tablet (10 mg total) by mouth every evening. 90 tablet 1    fluticasone propionate (FLONASE) 50 mcg/actuation nasal spray 2 sprays (100 mcg total) by Each Nostril route once daily. 15.8 mL 0    ibuprofen (ADVIL,MOTRIN) 600 MG tablet Take 1 tablet (600 mg total) by mouth every 6 (six) hours as needed (cramping). 20 tablet 2    amoxicillin (AMOXIL) 500 MG capsule Take 1 capsule (500 mg total) by mouth every 8 (eight) hours. for 7 days 21 capsule 0     No current facility-administered medications for this visit.     Review of patient's allergies indicates:  No Known Allergies   Past Medical History:   Diagnosis Date    Allergy      Past Surgical History:   Procedure Laterality Date    HERNIA REPAIR         Review of Systems   Constitutional: Negative for activity change, appetite change, chills, fatigue, fever, unexpected weight change and weight loss.   HENT: Positive for congestion, postnasal drip and rhinorrhea. Negative for ear pain, sinus pressure, sinus pain, sneezing and sore throat.    Eyes: Negative for pain,  "discharge and visual disturbance.   Respiratory: Positive for cough and shortness of breath (during episodes of coughing). Negative for hemoptysis, chest tightness and wheezing.    Cardiovascular: Negative for chest pain, palpitations and leg swelling.   Gastrointestinal: Positive for vomiting (posttussive). Negative for abdominal distention, abdominal pain, blood in stool, constipation, diarrhea, heartburn and nausea.   Genitourinary: Negative for difficulty urinating, flank pain, frequency, hematuria, pelvic pain, urgency and vaginal discharge.   Musculoskeletal: Negative for back pain, joint swelling and myalgias.   Skin: Negative for color change, rash and wound.   Allergic/Immunologic: Negative for environmental allergies.   Neurological: Negative for dizziness, seizures, syncope, weakness, light-headedness, numbness and headaches.   Hematological: Negative for adenopathy.   Psychiatric/Behavioral: Negative for agitation, confusion, dysphoric mood, hallucinations, sleep disturbance and suicidal ideas. The patient is not nervous/anxious.       OBJECTIVE:      Vitals:    02/28/22 0850   BP: 136/88   BP Location: Left arm   Patient Position: Sitting   BP Method: Medium (Manual)   Pulse: 98   Temp: 98.6 °F (37 °C)   SpO2: 100%   Weight: 86.6 kg (191 lb)   Height: 5' 7" (1.702 m)     Physical Exam  Vitals reviewed.   Constitutional:       General: She is not in acute distress.     Appearance: Normal appearance. She is well-developed and overweight. She is not diaphoretic.   HENT:      Head: Normocephalic and atraumatic.      Right Ear: Hearing, ear canal and external ear normal. A middle ear effusion (cloudy, slightly robert) is present. Tympanic membrane is bulging. Tympanic membrane is not perforated, erythematous or retracted.      Left Ear: Hearing, ear canal and external ear normal. A middle ear effusion (clear fluid) is present. Tympanic membrane is not perforated, erythematous, retracted or bulging.      " Nose: Mucosal edema and congestion present. No rhinorrhea.      Mouth/Throat:      Lips: Pink.      Mouth: Mucous membranes are moist.      Pharynx: Uvula midline. Oropharyngeal exudate present. No pharyngeal swelling or posterior oropharyngeal erythema.   Eyes:      General: Lids are normal. No scleral icterus.        Right eye: No discharge.         Left eye: No discharge.      Extraocular Movements: Extraocular movements intact.      Conjunctiva/sclera: Conjunctivae normal.      Pupils: Pupils are equal, round, and reactive to light.   Neck:      Thyroid: No thyroid mass or thyromegaly.      Vascular: No carotid bruit.      Trachea: Trachea and phonation normal. No tracheal deviation.   Cardiovascular:      Rate and Rhythm: Normal rate and regular rhythm.      Pulses: Normal pulses.      Heart sounds: Normal heart sounds. No murmur heard.    No friction rub. No gallop.   Pulmonary:      Effort: Pulmonary effort is normal. No respiratory distress.      Breath sounds: Normal breath sounds. No stridor. No decreased breath sounds, wheezing, rhonchi or rales.   Chest:   Breasts:      Right: No supraclavicular adenopathy.      Left: No supraclavicular adenopathy.       Abdominal:      General: Bowel sounds are normal.      Palpations: Abdomen is soft.      Tenderness: There is no abdominal tenderness.   Musculoskeletal:         General: Normal range of motion.      Cervical back: Full passive range of motion without pain, normal range of motion and neck supple.      Right lower leg: No edema.      Left lower leg: No edema.   Lymphadenopathy:      Cervical: No cervical adenopathy.      Upper Body:      Right upper body: No supraclavicular adenopathy.      Left upper body: No supraclavicular adenopathy.   Skin:     General: Skin is warm and dry.      Capillary Refill: Capillary refill takes less than 2 seconds.      Findings: No rash.   Neurological:      General: No focal deficit present.      Mental Status: She is  alert and oriented to person, place, and time.      Coordination: Coordination is intact.      Gait: Gait is intact.   Psychiatric:         Attention and Perception: Attention and perception normal.         Mood and Affect: Mood and affect normal.         Speech: Speech normal.         Behavior: Behavior normal. Behavior is cooperative.         Thought Content: Thought content normal. Thought content does not include suicidal plan.         Cognition and Memory: Cognition and memory normal.         Judgment: Judgment normal.        Assessment:       1. Non-recurrent acute suppurative otitis media of right ear without spontaneous rupture of tympanic membrane    2. Persistent cough for 3 weeks or longer    3. Allergic rhinitis, unspecified seasonality, unspecified trigger    4. Postnasal drip        Plan:       Non-recurrent acute suppurative otitis media of right ear without spontaneous rupture of tympanic membrane  -     amoxicillin (AMOXIL) 500 MG capsule; Take 1 capsule (500 mg total) by mouth every 8 (eight) hours. for 7 days  Dispense: 21 capsule; Refill: 0    Persistent cough for 3 weeks or longer  -     X-Ray Chest PA And Lateral; Future    Allergic rhinitis, unspecified seasonality, unspecified trigger  -     cetirizine (ZYRTEC) 10 MG tablet; Take 1 tablet (10 mg total) by mouth every evening.  Dispense: 90 tablet; Refill: 1  -     fluticasone propionate (FLONASE) 50 mcg/actuation nasal spray; 2 sprays (100 mcg total) by Each Nostril route once daily.  Dispense: 15.8 mL; Refill: 0    Postnasal drip  -     cetirizine (ZYRTEC) 10 MG tablet; Take 1 tablet (10 mg total) by mouth every evening.  Dispense: 90 tablet; Refill: 1  -     fluticasone propionate (FLONASE) 50 mcg/actuation nasal spray; 2 sprays (100 mcg total) by Each Nostril route once daily.  Dispense: 15.8 mL; Refill: 0        Follow up if symptoms worsen or fail to improve.      2/28/2022 Laine Uribe, DARREL, FNP

## 2022-03-26 ENCOUNTER — HOSPITAL ENCOUNTER (EMERGENCY)
Facility: HOSPITAL | Age: 32
Discharge: HOME | End: 2022-03-26
Attending: EMERGENCY MEDICINE
Payer: MEDICAID

## 2022-03-26 VITALS
SYSTOLIC BLOOD PRESSURE: 116 MMHG | HEART RATE: 63 BPM | OXYGEN SATURATION: 100 % | WEIGHT: 183 LBS | TEMPERATURE: 98 F | DIASTOLIC BLOOD PRESSURE: 62 MMHG | RESPIRATION RATE: 18 BRPM | BODY MASS INDEX: 28.72 KG/M2 | HEIGHT: 67 IN

## 2022-03-26 DIAGNOSIS — K80.20 CALCULUS OF GALLBLADDER WITHOUT CHOLECYSTITIS WITHOUT OBSTRUCTION: Primary | ICD-10-CM

## 2022-03-26 LAB
ALBUMIN SERPL BCP-MCNC: 4.4 G/DL (ref 3.5–5.2)
ALP SERPL-CCNC: 85 U/L (ref 55–135)
ALT SERPL W/O P-5'-P-CCNC: 13 U/L (ref 10–44)
ANION GAP SERPL CALC-SCNC: 5 MMOL/L (ref 8–16)
AST SERPL-CCNC: 16 U/L (ref 10–40)
B-HCG UR QL: NEGATIVE
BASOPHILS # BLD AUTO: 0.05 K/UL (ref 0–0.2)
BASOPHILS NFR BLD: 0.7 % (ref 0–1.9)
BILIRUB SERPL-MCNC: 0.3 MG/DL (ref 0.1–1)
BILIRUB UR QL STRIP: NEGATIVE
BUN SERPL-MCNC: 13 MG/DL (ref 6–20)
CALCIUM SERPL-MCNC: 9.5 MG/DL (ref 8.7–10.5)
CHLORIDE SERPL-SCNC: 107 MMOL/L (ref 95–110)
CLARITY UR: CLEAR
CO2 SERPL-SCNC: 25 MMOL/L (ref 23–29)
COLOR UR: YELLOW
CREAT SERPL-MCNC: 0.8 MG/DL (ref 0.5–1.4)
CTP QC/QA: YES
DIFFERENTIAL METHOD: ABNORMAL
EOSINOPHIL # BLD AUTO: 0.2 K/UL (ref 0–0.5)
EOSINOPHIL NFR BLD: 3.1 % (ref 0–8)
ERYTHROCYTE [DISTWIDTH] IN BLOOD BY AUTOMATED COUNT: 16.7 % (ref 11.5–14.5)
EST. GFR  (AFRICAN AMERICAN): >60 ML/MIN/1.73 M^2
EST. GFR  (NON AFRICAN AMERICAN): >60 ML/MIN/1.73 M^2
GLUCOSE SERPL-MCNC: 89 MG/DL (ref 70–110)
GLUCOSE UR QL STRIP: NEGATIVE
HCT VFR BLD AUTO: 36.2 % (ref 37–48.5)
HGB BLD-MCNC: 10.9 G/DL (ref 12–16)
HGB UR QL STRIP: NEGATIVE
IMM GRANULOCYTES # BLD AUTO: 0.01 K/UL (ref 0–0.04)
IMM GRANULOCYTES NFR BLD AUTO: 0.1 % (ref 0–0.5)
KETONES UR QL STRIP: NEGATIVE
LEUKOCYTE ESTERASE UR QL STRIP: NEGATIVE
LIPASE SERPL-CCNC: 51 U/L (ref 4–60)
LYMPHOCYTES # BLD AUTO: 3.2 K/UL (ref 1–4.8)
LYMPHOCYTES NFR BLD: 42.7 % (ref 18–48)
MAGNESIUM SERPL-MCNC: 2 MG/DL (ref 1.6–2.6)
MCH RBC QN AUTO: 23.8 PG (ref 27–31)
MCHC RBC AUTO-ENTMCNC: 30.1 G/DL (ref 32–36)
MCV RBC AUTO: 79 FL (ref 82–98)
MONOCYTES # BLD AUTO: 0.7 K/UL (ref 0.3–1)
MONOCYTES NFR BLD: 9.3 % (ref 4–15)
NEUTROPHILS # BLD AUTO: 3.3 K/UL (ref 1.8–7.7)
NEUTROPHILS NFR BLD: 44.1 % (ref 38–73)
NITRITE UR QL STRIP: NEGATIVE
NRBC BLD-RTO: 0 /100 WBC
PH UR STRIP: 6 [PH] (ref 5–8)
PLATELET # BLD AUTO: 509 K/UL (ref 150–450)
PMV BLD AUTO: 9.7 FL (ref 9.2–12.9)
POTASSIUM SERPL-SCNC: 3.5 MMOL/L (ref 3.5–5.1)
PROT SERPL-MCNC: 8.2 G/DL (ref 6–8.4)
PROT UR QL STRIP: NEGATIVE
RBC # BLD AUTO: 4.58 M/UL (ref 4–5.4)
SODIUM SERPL-SCNC: 137 MMOL/L (ref 136–145)
SP GR UR STRIP: 1.02 (ref 1–1.03)
URN SPEC COLLECT METH UR: NORMAL
UROBILINOGEN UR STRIP-ACNC: NEGATIVE EU/DL
WBC # BLD AUTO: 7.44 K/UL (ref 3.9–12.7)

## 2022-03-26 PROCEDURE — 85025 COMPLETE CBC W/AUTO DIFF WBC: CPT | Performed by: GENERAL PRACTICE

## 2022-03-26 PROCEDURE — 96361 HYDRATE IV INFUSION ADD-ON: CPT

## 2022-03-26 PROCEDURE — 81003 URINALYSIS AUTO W/O SCOPE: CPT | Performed by: EMERGENCY MEDICINE

## 2022-03-26 PROCEDURE — 83690 ASSAY OF LIPASE: CPT | Performed by: GENERAL PRACTICE

## 2022-03-26 PROCEDURE — 25000003 PHARM REV CODE 250: Performed by: GENERAL PRACTICE

## 2022-03-26 PROCEDURE — 96375 TX/PRO/DX INJ NEW DRUG ADDON: CPT

## 2022-03-26 PROCEDURE — 99284 EMERGENCY DEPT VISIT MOD MDM: CPT | Mod: 25

## 2022-03-26 PROCEDURE — 83735 ASSAY OF MAGNESIUM: CPT | Performed by: GENERAL PRACTICE

## 2022-03-26 PROCEDURE — 80053 COMPREHEN METABOLIC PANEL: CPT | Performed by: GENERAL PRACTICE

## 2022-03-26 PROCEDURE — 81025 URINE PREGNANCY TEST: CPT | Performed by: EMERGENCY MEDICINE

## 2022-03-26 PROCEDURE — 96374 THER/PROPH/DIAG INJ IV PUSH: CPT

## 2022-03-26 PROCEDURE — 63600175 PHARM REV CODE 636 W HCPCS: Performed by: GENERAL PRACTICE

## 2022-03-26 RX ORDER — ONDANSETRON 4 MG/1
4 TABLET, ORALLY DISINTEGRATING ORAL EVERY 8 HOURS PRN
Qty: 20 TABLET | Refills: 0 | Status: SHIPPED | OUTPATIENT
Start: 2022-03-26 | End: 2023-01-05

## 2022-03-26 RX ORDER — HYDROCODONE BITARTRATE AND ACETAMINOPHEN 5; 325 MG/1; MG/1
1 TABLET ORAL EVERY 6 HOURS PRN
Qty: 10 TABLET | Refills: 0 | Status: SHIPPED | OUTPATIENT
Start: 2022-03-26 | End: 2023-01-05

## 2022-03-26 RX ORDER — ONDANSETRON 2 MG/ML
4 INJECTION INTRAMUSCULAR; INTRAVENOUS
Status: COMPLETED | OUTPATIENT
Start: 2022-03-26 | End: 2022-03-26

## 2022-03-26 RX ORDER — IBUPROFEN 800 MG/1
800 TABLET ORAL EVERY 8 HOURS PRN
Qty: 20 TABLET | Refills: 0 | Status: SHIPPED | OUTPATIENT
Start: 2022-03-26 | End: 2022-03-31

## 2022-03-26 RX ORDER — MORPHINE SULFATE 4 MG/ML
4 INJECTION, SOLUTION INTRAMUSCULAR; INTRAVENOUS
Status: COMPLETED | OUTPATIENT
Start: 2022-03-26 | End: 2022-03-26

## 2022-03-26 RX ADMIN — SODIUM CHLORIDE 1000 ML: 0.9 INJECTION, SOLUTION INTRAVENOUS at 04:03

## 2022-03-26 RX ADMIN — ONDANSETRON 4 MG: 2 INJECTION INTRAMUSCULAR; INTRAVENOUS at 04:03

## 2022-03-26 RX ADMIN — MORPHINE SULFATE 4 MG: 4 INJECTION, SOLUTION INTRAMUSCULAR; INTRAVENOUS at 04:03

## 2022-03-26 NOTE — ED PROVIDER NOTES
Encounter Date: 3/26/2022       History     Chief Complaint   Patient presents with    Abdominal Pain    Back Pain     Pt presents to ED with c/o mid-lower back pain that radiates to the upper right side of abdomen that started around 8pm last night. Denies any n/v/d or urinary symptoms at this time     HPI   Jane Garcia is a 31 y.o. female presenting to the emergency department for evaluation of right upper quadrant/right flank pain beginning around 630-7 p.m. last night after eating canes chicken for dinner.  Denies any nausea, vomiting, diarrhea.  Denies any dysuria, vaginal bleeding, vaginal discharge, chest pain, shortness of breath.  States ever having experienced symptoms like this before, rates pain as a 9/10 at this time, denies any alcohol, illicit drug use, tobacco use.    Review of patient's allergies indicates:  No Known Allergies  Past Medical History:   Diagnosis Date    Allergy      Past Surgical History:   Procedure Laterality Date    HERNIA REPAIR       Family History   Problem Relation Age of Onset    Hypertension Mother     Cancer Maternal Grandmother     Hypertension Maternal Grandmother     Hypertension Father     Hypertension Maternal Aunt     Hypertension Maternal Uncle     No Known Problems Paternal Uncle     Heart disease Maternal Grandfather      Social History     Tobacco Use    Smoking status: Never Smoker    Smokeless tobacco: Never Used   Substance Use Topics    Alcohol use: Not Currently    Drug use: No     Review of Systems   Constitutional: Negative for fatigue and fever.   HENT: Negative for trouble swallowing and voice change.    Eyes: Negative for photophobia and visual disturbance.   Respiratory: Negative for cough, shortness of breath and wheezing.    Cardiovascular: Negative for chest pain and palpitations.   Gastrointestinal: Positive for abdominal pain. Negative for nausea.   Genitourinary: Negative for dysuria and hematuria.   Skin: Negative for color  change and wound.   Allergic/Immunologic: Negative for environmental allergies, food allergies and immunocompromised state.   Psychiatric/Behavioral: Negative for agitation, behavioral problems and confusion.       Physical Exam     Initial Vitals [03/26/22 0250]   BP Pulse Resp Temp SpO2   (!) 177/103 79 16 97.9 °F (36.6 °C) 100 %      MAP       --         Physical Exam    Nursing note and vitals reviewed.  HENT:   Head: Normocephalic and atraumatic.   Nose: Nose normal.   Mouth/Throat: No oropharyngeal exudate.   Eyes: Conjunctivae and EOM are normal. Pupils are equal, round, and reactive to light.   Neck: Neck supple. No tracheal deviation present. No JVD present.   Normal range of motion.  Cardiovascular: Normal rate, regular rhythm and intact distal pulses.   No murmur heard.  Pulmonary/Chest: No stridor.   Abdominal: She exhibits no distension. There is abdominal tenderness (Right upper quadrant pain).   Musculoskeletal:         General: No tenderness or edema. Normal range of motion.      Cervical back: Normal range of motion and neck supple.     Neurological: She is alert and oriented to person, place, and time. She has normal strength. GCS score is 15. GCS eye subscore is 4. GCS verbal subscore is 5. GCS motor subscore is 6.   Skin: Skin is warm. No rash noted. No erythema.   Psychiatric: She has a normal mood and affect. Her behavior is normal. Judgment and thought content normal.         ED Course   Procedures  Labs Reviewed   CBC W/ AUTO DIFFERENTIAL - Abnormal; Notable for the following components:       Result Value    Hemoglobin 10.9 (*)     Hematocrit 36.2 (*)     MCV 79 (*)     MCH 23.8 (*)     MCHC 30.1 (*)     RDW 16.7 (*)     Platelets 509 (*)     All other components within normal limits   COMPREHENSIVE METABOLIC PANEL - Abnormal; Notable for the following components:    Anion Gap 5 (*)     All other components within normal limits   URINALYSIS, REFLEX TO URINE CULTURE    Narrative:     Specimen  Source->Urine   MAGNESIUM   LIPASE   URINALYSIS, REFLEX TO URINE CULTURE   POCT URINE PREGNANCY          Imaging Results          US Abdomen Limited (Final result)  Result time 03/26/22 04:43:56    Final result by John Cha MD (03/26/22 04:43:56)                 Narrative:    Ultrasound right upper quadrant on 3/26/2022    Clinical indication: Right upper quadrant pain    COMPARISON: None    FINDINGS: Multiple sonographic images are obtained throughout the right upper quadrant, both transverse and sagittal images are obtained. Visualized pancreas is unremarkable. Visualized aorta is unremarkable without evidence of an aneurysm. Visualized liver is homogeneous without focal liver lesion. The portal vein is patent and with a normal directional flow. There is a 1 cm echogenic focus with posterior shadowing in the gallbladder neck consistent with gallstone. No gallbladder wall thickening or pericholecystic fluid is noted. Common duct measures 4 mm which is within normal limits mitigating against obstruction of the biliary tree. Right kidney shows no hydronephrosis. No free fluid is noted in the right upper quadrant.    IMPRESSION: Cholelithiasis, otherwise unremarkable.    Electronically signed by:  Alan Cha  3/26/2022 4:43 AM CDT Workstation: [id number]                               Medications   sodium chloride 0.9% bolus 1,000 mL (1,000 mLs Intravenous New Bag 3/26/22 0440)   morphine injection 4 mg (4 mg Intravenous Given 3/26/22 0441)   ondansetron injection 4 mg (4 mg Intravenous Given 3/26/22 0441)           APC / Resident Notes:   Jane Garcia is a 31 y.o. female presenting to the emergency department for evaluation of right upper quadrant pain beginning earlier tonight after eating red [employer].  No previous history of gallbladder issues.  Afebrile, nontoxic appearing.    CMP shows no evidence of cholecystitis with no transaminitis, no elevation alkaline phosphatase, no elevation in total  bilirubin.  Patient's right upper quadrant ultrasound shows cholelithiasis without evidence of acute cholecystitis, choledocholithiasis, pancreatitis.    Symptoms improved with administration of IV Zofran IV morphine, IV fluids.  Patient will get prescriptions for symptomatic management.  Was referred to General surgery for discussion of outpatient cholecystectomy.  ED return precautions are discussed.  Discharged in stable condition.    Naveen Vázquez DO  PGY-4 Emergency Medicine  5:12 AM          ED Course as of 03/26/22 0512   Sat Mar 26, 2022   0410 Preg Test, Ur: Negative [RF]   0410 NITRITE UA: Negative [RF]   0410 Leukocytes, UA: Negative [RF]   0502 Alkaline Phosphatase: 85 [RF]   0502 AST: 16 [RF]   0502 ALT: 13 [RF]   0503 US Abdomen Limited  IMPRESSION: Cholelithiasis, otherwise unremarkable. [RF]   0510 BUN: 13 [RF]   0510 Creatinine: 0.8 [RF]      ED Course User Index  [RF] Naveen Vázquez DO             Clinical Impression:   Final diagnoses:  [K80.20] Calculus of gallbladder without cholecystitis without obstruction (Primary)          ED Disposition Condition    Discharge Stable        ED Prescriptions     Medication Sig Dispense Start Date End Date Auth. Provider    ondansetron (ZOFRAN-ODT) 4 MG TbDL Take 1 tablet (4 mg total) by mouth every 8 (eight) hours as needed (nausea and emesis). 20 tablet 3/26/2022  Naveen Vázquez DO    HYDROcodone-acetaminophen (NORCO) 5-325 mg per tablet Take 1 tablet by mouth every 6 (six) hours as needed for Pain. 10 tablet 3/26/2022  Naveen Vázquez DO    ibuprofen (ADVIL,MOTRIN) 800 MG tablet Take 1 tablet (800 mg total) by mouth every 8 (eight) hours as needed for Pain. 20 tablet 3/26/2022 3/31/2022 Naveen Vázquez DO        Follow-up Information     Follow up With Specialties Details Why Contact Info Additional Information    [facility] - Emergency Dept Emergency Medicine Go to  As needed, If symptoms worsen [address] [facility]  [address]  [phone] 1st floor    Enrique Thurman MD General Surgery Schedule an appointment as soon as possible for a visit  With general surgery for outpatient evaluation of symptomatic cholelithiasis [address] [address]  [phone]              Naveen Vázquez,   Resident  03/26/22 0512

## 2022-04-21 ENCOUNTER — OFFICE VISIT (OUTPATIENT)
Dept: SURGERY | Facility: CLINIC | Age: 32
End: 2022-04-21
Payer: MEDICAID

## 2022-04-21 VITALS — TEMPERATURE: 97 F | HEART RATE: 89 BPM | DIASTOLIC BLOOD PRESSURE: 92 MMHG | SYSTOLIC BLOOD PRESSURE: 135 MMHG

## 2022-04-21 DIAGNOSIS — K80.10 CHRONIC CALCULOUS CHOLECYSTITIS: Primary | ICD-10-CM

## 2022-04-21 PROCEDURE — 3075F SYST BP GE 130 - 139MM HG: CPT | Mod: CPTII,S$GLB,, | Performed by: SURGERY

## 2022-04-21 PROCEDURE — 1159F PR MEDICATION LIST DOCUMENTED IN MEDICAL RECORD: ICD-10-PCS | Mod: CPTII,S$GLB,, | Performed by: SURGERY

## 2022-04-21 PROCEDURE — 3080F PR MOST RECENT DIASTOLIC BLOOD PRESSURE >= 90 MM HG: ICD-10-PCS | Mod: CPTII,S$GLB,, | Performed by: SURGERY

## 2022-04-21 PROCEDURE — 1160F RVW MEDS BY RX/DR IN RCRD: CPT | Mod: CPTII,S$GLB,, | Performed by: SURGERY

## 2022-04-21 PROCEDURE — 99204 PR OFFICE/OUTPT VISIT, NEW, LEVL IV, 45-59 MIN: ICD-10-PCS | Mod: S$GLB,,, | Performed by: SURGERY

## 2022-04-21 PROCEDURE — 99204 OFFICE O/P NEW MOD 45 MIN: CPT | Mod: S$GLB,,, | Performed by: SURGERY

## 2022-04-21 PROCEDURE — 1159F MED LIST DOCD IN RCRD: CPT | Mod: CPTII,S$GLB,, | Performed by: SURGERY

## 2022-04-21 PROCEDURE — 3080F DIAST BP >= 90 MM HG: CPT | Mod: CPTII,S$GLB,, | Performed by: SURGERY

## 2022-04-21 PROCEDURE — 1160F PR REVIEW ALL MEDS BY PRESCRIBER/CLIN PHARMACIST DOCUMENTED: ICD-10-PCS | Mod: CPTII,S$GLB,, | Performed by: SURGERY

## 2022-04-21 PROCEDURE — 3075F PR MOST RECENT SYSTOLIC BLOOD PRESS GE 130-139MM HG: ICD-10-PCS | Mod: CPTII,S$GLB,, | Performed by: SURGERY

## 2022-04-21 NOTE — PROGRESS NOTES
Subjective:       Patient ID: Jane Garcia is a 31 y.o. female.    Chief Complaint: Consult (gallstones)      HPI:  31 year old female referred to the office with symptomatic gallstones. She had sudden onset of RUQ pain and nausea several weeks ago. She went to the ED. Was found to have gallstones including 1 cm stone near the neck. LFT's OK. She was discharged home and has been having daily mild symptoms with meals. She has no fever or chills. She has past history of umbilical hernia repair at age 3.     Past Medical History:   Diagnosis Date    Allergy      Past Surgical History:   Procedure Laterality Date    HERNIA REPAIR       Review of patient's allergies indicates:  No Known Allergies  Medication List with Changes/Refills   Current Medications    ALBUTEROL (PROVENTIL/VENTOLIN HFA) 90 MCG/ACTUATION INHALER    Inhale 2 puffs into the lungs every 6 (six) hours as needed for Wheezing. Rescue She states her mom gave her this inhaler    BENZONATATE (TESSALON) 100 MG CAPSULE    TAKE 1 CAPSULE BY MOUTH THREE TIMES DAILY FOR 10 DAYS AS NEEDED FOR COUGH    CETIRIZINE (ZYRTEC) 10 MG TABLET    Take 1 tablet (10 mg total) by mouth every evening.    FLUTICASONE PROPIONATE (FLONASE) 50 MCG/ACTUATION NASAL SPRAY    2 sprays (100 mcg total) by Each Nostril route once daily.    HYDROCODONE-ACETAMINOPHEN (NORCO) 5-325 MG PER TABLET    Take 1 tablet by mouth every 6 (six) hours as needed for Pain.    IBUPROFEN (ADVIL,MOTRIN) 600 MG TABLET    Take 1 tablet (600 mg total) by mouth every 6 (six) hours as needed (cramping).    ONDANSETRON (ZOFRAN-ODT) 4 MG TBDL    Take 1 tablet (4 mg total) by mouth every 8 (eight) hours as needed (nausea and emesis).     Family History   Problem Relation Age of Onset    Hypertension Mother     Cancer Maternal Grandmother     Hypertension Maternal Grandmother     Hypertension Father     Hypertension Maternal Aunt     Hypertension Maternal Uncle     No Known Problems Paternal Uncle      Heart disease Maternal Grandfather      Social History     Socioeconomic History    Marital status:    Tobacco Use    Smoking status: Never Smoker    Smokeless tobacco: Never Used   Substance and Sexual Activity    Alcohol use: Not Currently    Drug use: No    Sexual activity: Yes     Partners: Male   Social History Narrative    ** Merged History Encounter **              Review of Systems   Constitutional: Negative for appetite change, chills, fever and unexpected weight change.   HENT: Negative for hearing loss, rhinorrhea, sore throat and voice change.    Eyes: Negative for photophobia and visual disturbance.   Respiratory: Negative for cough, choking and shortness of breath.    Cardiovascular: Negative for chest pain, palpitations and leg swelling.   Gastrointestinal: Positive for abdominal pain and nausea. Negative for blood in stool, constipation, diarrhea and vomiting.   Endocrine: Negative for cold intolerance, heat intolerance, polydipsia and polyuria.   Musculoskeletal: Negative for arthralgias, back pain, joint swelling and neck stiffness.   Skin: Negative for color change, pallor and rash.   Neurological: Negative for dizziness, seizures, syncope and headaches.   Hematological: Negative for adenopathy. Does not bruise/bleed easily.   Psychiatric/Behavioral: Negative for agitation, behavioral problems and confusion.       Objective:      Physical Exam  Constitutional:       General: She is not in acute distress.     Appearance: Normal appearance. She is well-developed. She is not toxic-appearing.   HENT:      Head: Normocephalic and atraumatic. No abrasion or laceration.      Right Ear: External ear normal.      Left Ear: External ear normal.      Nose: Nose normal.   Eyes:      Pupils: Pupils are equal, round, and reactive to light.   Neck:      Trachea: Trachea and phonation normal. No tracheal deviation.   Cardiovascular:      Rate and Rhythm: Normal rate and regular rhythm.    Pulmonary:      Effort: Pulmonary effort is normal. No tachypnea, accessory muscle usage or respiratory distress.   Abdominal:      General: There is no distension.      Palpations: Abdomen is soft.      Tenderness: There is no abdominal tenderness. There is no guarding or rebound.   Musculoskeletal:      Cervical back: Neck supple. Normal range of motion.   Lymphadenopathy:      Cervical: No cervical adenopathy.   Skin:     General: Skin is warm.      Coloration: Skin is not jaundiced.   Neurological:      Mental Status: She is alert and oriented to person, place, and time.      Coordination: Coordination normal.      Gait: Gait normal.   Psychiatric:         Speech: Speech normal.         Behavior: Behavior normal.         Assessment/Plan:   Jane KULKARNI was seen today for consult.    Diagnoses and all orders for this visit:    Chronic calculous cholecystitis  -     Case Request Operating Room: CHOLECYSTECTOMY, LAPAROSCOPIC  -     Comprehensive metabolic panel; Future  -     CBC auto differential; Future  -     EKG 12-lead; Future  -     X-Ray Chest PA And Lateral; Future    Other orders  -     Full code; Standing  -     Vital signs; Standing  -     Insert peripheral IV; Standing  -     Diet NPO; Standing  -     IP VTE LOW RISK PATIENT; Standing  -     Place sequential compression device; Standing  -     Pulse Oximetry Q4H; Standing  -     Place in Outpatient; Standing  -     ceFOXItin (MEFOXIN) 2 g in dextrose 5 % 50 mL IVPB  -     Full code  -     Insert peripheral IV      Plan for cholecystectomy May 23.   I discussed the proposed procedures with the patient including risks, benefits, indications, alternatives and special concerns.  The patient appears to understand and agrees to go ahead with surgery.  I have made no promises, warranties or verbal agreements beyond what was discussed above.

## 2022-05-12 ENCOUNTER — TELEPHONE (OUTPATIENT)
Dept: SURGERY | Facility: CLINIC | Age: 32
End: 2022-05-12
Payer: MEDICAID

## 2022-05-12 NOTE — TELEPHONE ENCOUNTER
Spoke with patient confirmed all labs will be done at time of pre op appointment no other appointments are necessary. Understanding verbalized

## 2022-05-12 NOTE — TELEPHONE ENCOUNTER
----- Message from Baljit Singh sent at 5/12/2022  1:39 PM CDT -----  Contact: pt at [phone]  Type: Needs Medical Advice  Who Called:  pt  Best Call Back Number: [phone]  Additional Information: pt is calling the office to speak with the nurse in regards to the x-ray that was ordered but the pt is confused if she needs to have this done prior to her surgery. Please call back and advise.

## 2022-05-13 DIAGNOSIS — N64.89 GALACTOCELE: Primary | ICD-10-CM

## 2022-05-20 ENCOUNTER — HOSPITAL ENCOUNTER (OUTPATIENT)
Dept: PREADMISSION TESTING | Facility: HOSPITAL | Age: 32
Discharge: HOME | End: 2022-05-20
Attending: SURGERY
Payer: MEDICAID

## 2022-05-20 ENCOUNTER — HOSPITAL ENCOUNTER (OUTPATIENT)
Dept: RADIOLOGY | Facility: HOSPITAL | Age: 32
Discharge: HOME | End: 2022-05-20
Attending: SURGERY
Payer: MEDICAID

## 2022-05-20 VITALS
BODY MASS INDEX: 28.09 KG/M2 | TEMPERATURE: 98 F | OXYGEN SATURATION: 100 % | HEART RATE: 88 BPM | DIASTOLIC BLOOD PRESSURE: 85 MMHG | WEIGHT: 179 LBS | RESPIRATION RATE: 16 BRPM | SYSTOLIC BLOOD PRESSURE: 139 MMHG | HEIGHT: 67 IN

## 2022-05-20 DIAGNOSIS — Z01.818 PREOP TESTING: ICD-10-CM

## 2022-05-20 DIAGNOSIS — K80.10 CHRONIC CALCULOUS CHOLECYSTITIS: ICD-10-CM

## 2022-05-20 DIAGNOSIS — Z01.818 PREOP TESTING: Primary | ICD-10-CM

## 2022-05-20 LAB
ALBUMIN SERPL BCP-MCNC: 4.1 G/DL (ref 3.5–5.2)
ALP SERPL-CCNC: 81 U/L (ref 55–135)
ALT SERPL W/O P-5'-P-CCNC: 12 U/L (ref 10–44)
ANION GAP SERPL CALC-SCNC: 8 MMOL/L (ref 8–16)
AST SERPL-CCNC: 14 U/L (ref 10–40)
BASOPHILS # BLD AUTO: 0.04 K/UL (ref 0–0.2)
BASOPHILS NFR BLD: 0.7 % (ref 0–1.9)
BILIRUB SERPL-MCNC: 0.3 MG/DL (ref 0.1–1)
BUN SERPL-MCNC: 8 MG/DL (ref 6–20)
CALCIUM SERPL-MCNC: 9.2 MG/DL (ref 8.7–10.5)
CHLORIDE SERPL-SCNC: 105 MMOL/L (ref 95–110)
CO2 SERPL-SCNC: 26 MMOL/L (ref 23–29)
CREAT SERPL-MCNC: 0.7 MG/DL (ref 0.5–1.4)
DIFFERENTIAL METHOD: ABNORMAL
EOSINOPHIL # BLD AUTO: 0.2 K/UL (ref 0–0.5)
EOSINOPHIL NFR BLD: 3.6 % (ref 0–8)
ERYTHROCYTE [DISTWIDTH] IN BLOOD BY AUTOMATED COUNT: 16.6 % (ref 11.5–14.5)
EST. GFR  (AFRICAN AMERICAN): >60 ML/MIN/1.73 M^2
EST. GFR  (NON AFRICAN AMERICAN): >60 ML/MIN/1.73 M^2
GLUCOSE SERPL-MCNC: 86 MG/DL (ref 70–110)
HCT VFR BLD AUTO: 34.2 % (ref 37–48.5)
HGB BLD-MCNC: 10.2 G/DL (ref 12–16)
IMM GRANULOCYTES # BLD AUTO: 0.01 K/UL (ref 0–0.04)
IMM GRANULOCYTES NFR BLD AUTO: 0.2 % (ref 0–0.5)
LYMPHOCYTES # BLD AUTO: 2.7 K/UL (ref 1–4.8)
LYMPHOCYTES NFR BLD: 49.8 % (ref 18–48)
MCH RBC QN AUTO: 23.7 PG (ref 27–31)
MCHC RBC AUTO-ENTMCNC: 29.8 G/DL (ref 32–36)
MCV RBC AUTO: 80 FL (ref 82–98)
MONOCYTES # BLD AUTO: 0.4 K/UL (ref 0.3–1)
MONOCYTES NFR BLD: 7.3 % (ref 4–15)
NEUTROPHILS # BLD AUTO: 2.1 K/UL (ref 1.8–7.7)
NEUTROPHILS NFR BLD: 38.4 % (ref 38–73)
NRBC BLD-RTO: 0 /100 WBC
PLATELET # BLD AUTO: 497 K/UL (ref 150–450)
PMV BLD AUTO: 9.4 FL (ref 9.2–12.9)
POTASSIUM SERPL-SCNC: 3.6 MMOL/L (ref 3.5–5.1)
PROT SERPL-MCNC: 8 G/DL (ref 6–8.4)
RBC # BLD AUTO: 4.3 M/UL (ref 4–5.4)
SODIUM SERPL-SCNC: 139 MMOL/L (ref 136–145)
WBC # BLD AUTO: 5.48 K/UL (ref 3.9–12.7)

## 2022-05-20 PROCEDURE — 71046 X-RAY EXAM CHEST 2 VIEWS: CPT | Mod: TC

## 2022-05-20 PROCEDURE — 93010 EKG 12-LEAD: ICD-10-PCS | Mod: ,,, | Performed by: SPECIALIST

## 2022-05-20 PROCEDURE — 85025 COMPLETE CBC W/AUTO DIFF WBC: CPT | Performed by: SURGERY

## 2022-05-20 PROCEDURE — 93005 ELECTROCARDIOGRAM TRACING: CPT | Performed by: SPECIALIST

## 2022-05-20 PROCEDURE — 93010 ELECTROCARDIOGRAM REPORT: CPT | Mod: ,,, | Performed by: SPECIALIST

## 2022-05-20 PROCEDURE — 36415 COLL VENOUS BLD VENIPUNCTURE: CPT | Performed by: SURGERY

## 2022-05-20 PROCEDURE — 80053 COMPREHEN METABOLIC PANEL: CPT | Performed by: SURGERY

## 2022-05-20 NOTE — DISCHARGE INSTRUCTIONS
To confirm, Your doctor has instructed you that surgery is scheduled for: 5/23/22    Pre-Op will call the afternoon prior to surgery between 4:00 and 6:00 PM with the final arrival time.      Please report to [facility] on [address] the morning of surgery.     Do not eat or drink anything after midnight the night before your surgery - THIS INCLUDES  WATER, GUM, MINTS AND CANDY.  YOU MAY BRUSH YOUR TEETH BUT DO NOT SWALLOW     TAKE ONLY THESE MEDICATIONS WITH A SMALL SIP OF WATER THE MORNING OF YOUR PROCEDURE:      ONLY if you are diabetic, check your sugar in the morning before your procedure.       Do not take any diabetic medicines or insulin the morning of surgery .     PLEASE NOTE:  The surgery schedule has many variables which may affect the time of your surgery case.  Family members should be available if your surgery time changes.  Plan to be here the day of your procedure between 4-6 hours.      DO NOT TAKE THESE MEDICATIONS 5-7 DAYS PRIOR to your procedure or per your surgeon's request: ASPIRIN, ALEVE, ADVIL, IBUPROFEN,  STEPHANIE SELTZER, BC , FISH OIL , VITAMIN E, HERBALS  (May take Tylenol)      ONLY if you are prescribed any types of blood thinners such as:  Aspirin, Coumadin, Plavix, Pradaxa, Xarelto, Aggrenox, Effient, Eliquis, Savasya, Brilinta, or any other, ask your surgeon whether you should stop taking them and how long before surgery you should stop.  You may also need to verify with the prescribing physician if it is ok to stop your medication.                                                        IMPORTANT INSTRUCTIONS      Do not smoke, vape or drink alcoholic beverages 24 hours prior to your procedure.  Shower the night before AND the morning of your procedure with a Chlorhexidine wash such as Hibiclens or Dial antibacterial soap from the neck down.    Do not get it on your face or in your eyes.  You may use your own shampoo and face wash. This helps your skin to be as bacteria free as  possible.    DO NOT remove hair from the surgery site.  Do not shave the incision site unless you are given specific instructions to do so.    Sleep in a bed with clean sheets.  Do not sleep with a pet in the bed.   If you wear contact lenses, dentures, hearing aids or glasses, bring a container to put them in during surgery and give to a family member for safe keeping.    Please leave all jewelry, piercing's and valuables at home.   ONLY if you have been diagnosed with sleep apnea please bring your C-PAP machine.  ONLY if you wear home oxygen please bring your portable oxygen tank the day of your procedure.   ONLY for patients requiring bowel prep, written instructions will be given by your doctor's office.  ONLY if you have a neuro stimulator, please bring the controller with you the morning of surgery  ONLY if a type and screen test is needed before surgery, please return:  If your doctor has scheduled you for an overnight stay, bring a small overnight bag with any personal items you need.    Make arrangements in advance for transportation home by a responsible adult.      You must make arrangements for transportation, TAXI'S, UBER'S OR LYFTS ARE NOT ALLOWED.        If you have any questions about these instructions, call Pre-Op Admit  Nursing at [phone] or the Pre-Op Day Surgery Unit at [phone].

## 2022-05-23 ENCOUNTER — ANESTHESIA EVENT (OUTPATIENT)
Dept: SURGERY | Facility: HOSPITAL | Age: 32
End: 2022-05-23
Payer: MEDICAID

## 2022-05-23 ENCOUNTER — HOSPITAL ENCOUNTER (OUTPATIENT)
Facility: HOSPITAL | Age: 32
Discharge: HOME | End: 2022-05-23
Attending: SURGERY | Admitting: SURGERY
Payer: MEDICAID

## 2022-05-23 ENCOUNTER — ANESTHESIA (OUTPATIENT)
Dept: SURGERY | Facility: HOSPITAL | Age: 32
End: 2022-05-23
Payer: MEDICAID

## 2022-05-23 VITALS
WEIGHT: 179 LBS | DIASTOLIC BLOOD PRESSURE: 79 MMHG | HEIGHT: 67 IN | OXYGEN SATURATION: 96 % | RESPIRATION RATE: 16 BRPM | HEART RATE: 90 BPM | TEMPERATURE: 98 F | BODY MASS INDEX: 28.09 KG/M2 | SYSTOLIC BLOOD PRESSURE: 148 MMHG

## 2022-05-23 DIAGNOSIS — K80.10 CHRONIC CALCULOUS CHOLECYSTITIS: ICD-10-CM

## 2022-05-23 PROCEDURE — 63600175 PHARM REV CODE 636 W HCPCS: Performed by: SURGERY

## 2022-05-23 PROCEDURE — 71000039 HC RECOVERY, EACH ADD'L HOUR: Performed by: SURGERY

## 2022-05-23 PROCEDURE — 71000015 HC POSTOP RECOV 1ST HR: Performed by: SURGERY

## 2022-05-23 PROCEDURE — 36000709 HC OR TIME LEV III EA ADD 15 MIN: Performed by: SURGERY

## 2022-05-23 PROCEDURE — 27000080 OPTIME MED/SURG SUP & DEVICES GENERAL CLASSIFICATION: Performed by: SURGERY

## 2022-05-23 PROCEDURE — 25000003 PHARM REV CODE 250: Performed by: NURSE ANESTHETIST, CERTIFIED REGISTERED

## 2022-05-23 PROCEDURE — 47562 LAPAROSCOPIC CHOLECYSTECTOMY: CPT | Mod: ,,, | Performed by: SURGERY

## 2022-05-23 PROCEDURE — 00790 ANES IPER UPR ABD NOS: CPT | Performed by: SURGERY

## 2022-05-23 PROCEDURE — 37000008 HC ANESTHESIA 1ST 15 MINUTES: Performed by: SURGERY

## 2022-05-23 PROCEDURE — 27201107 HC STYLET, STANDARD: Performed by: ANESTHESIOLOGY

## 2022-05-23 PROCEDURE — C9290 INJ, BUPIVACAINE LIPOSOME: HCPCS | Performed by: SURGERY

## 2022-05-23 PROCEDURE — 63600175 PHARM REV CODE 636 W HCPCS: Performed by: ANESTHESIOLOGY

## 2022-05-23 PROCEDURE — 71000033 HC RECOVERY, INTIAL HOUR: Performed by: SURGERY

## 2022-05-23 PROCEDURE — 47562 PR LAP,CHOLECYSTECTOMY: ICD-10-PCS | Mod: ,,, | Performed by: SURGERY

## 2022-05-23 PROCEDURE — 36000708 HC OR TIME LEV III 1ST 15 MIN: Performed by: SURGERY

## 2022-05-23 PROCEDURE — 25000003 PHARM REV CODE 250: Performed by: SURGERY

## 2022-05-23 PROCEDURE — 37000009 HC ANESTHESIA EA ADD 15 MINS: Performed by: SURGERY

## 2022-05-23 PROCEDURE — 27201423 OPTIME MED/SURG SUP & DEVICES STERILE SUPPLY: Performed by: SURGERY

## 2022-05-23 PROCEDURE — 27000671 HC TUBING MICROBORE EXT: Performed by: ANESTHESIOLOGY

## 2022-05-23 PROCEDURE — 25000003 PHARM REV CODE 250: Performed by: ANESTHESIOLOGY

## 2022-05-23 PROCEDURE — 27202107 HC XP QUATRO SENSOR: Performed by: ANESTHESIOLOGY

## 2022-05-23 PROCEDURE — 63600175 PHARM REV CODE 636 W HCPCS: Performed by: NURSE ANESTHETIST, CERTIFIED REGISTERED

## 2022-05-23 PROCEDURE — 27000673 HC TUBING BLOOD Y: Performed by: ANESTHESIOLOGY

## 2022-05-23 RX ORDER — LIDOCAINE HYDROCHLORIDE 20 MG/ML
JELLY TOPICAL
Status: DISCONTINUED | OUTPATIENT
Start: 2022-05-23 | End: 2022-05-23

## 2022-05-23 RX ORDER — OXYCODONE HYDROCHLORIDE 5 MG/1
5 TABLET ORAL
Status: DISCONTINUED | OUTPATIENT
Start: 2022-05-23 | End: 2022-05-23 | Stop reason: HOSPADM

## 2022-05-23 RX ORDER — MIDAZOLAM HYDROCHLORIDE 1 MG/ML
INJECTION INTRAMUSCULAR; INTRAVENOUS
Status: DISCONTINUED | OUTPATIENT
Start: 2022-05-23 | End: 2022-05-23

## 2022-05-23 RX ORDER — HYDROCODONE BITARTRATE AND ACETAMINOPHEN 5; 325 MG/1; MG/1
1 TABLET ORAL EVERY 6 HOURS PRN
Qty: 20 TABLET | Refills: 0 | Status: SHIPPED | OUTPATIENT
Start: 2022-05-23 | End: 2023-01-05

## 2022-05-23 RX ORDER — MEPERIDINE HYDROCHLORIDE 50 MG/ML
12.5 INJECTION INTRAMUSCULAR; INTRAVENOUS; SUBCUTANEOUS EVERY 10 MIN PRN
Status: DISCONTINUED | OUTPATIENT
Start: 2022-05-23 | End: 2022-05-23 | Stop reason: HOSPADM

## 2022-05-23 RX ORDER — ROCURONIUM BROMIDE 10 MG/ML
INJECTION, SOLUTION INTRAVENOUS
Status: DISCONTINUED | OUTPATIENT
Start: 2022-05-23 | End: 2022-05-23

## 2022-05-23 RX ORDER — CEFOXITIN SODIUM 2 G/50ML
INJECTION, SOLUTION INTRAVENOUS
Status: DISCONTINUED | OUTPATIENT
Start: 2022-05-23 | End: 2022-05-23

## 2022-05-23 RX ORDER — ONDANSETRON HYDROCHLORIDE 2 MG/ML
INJECTION, SOLUTION INTRAMUSCULAR; INTRAVENOUS
Status: DISCONTINUED | OUTPATIENT
Start: 2022-05-23 | End: 2022-05-23

## 2022-05-23 RX ORDER — FENTANYL CITRATE 50 UG/ML
INJECTION, SOLUTION INTRAMUSCULAR; INTRAVENOUS
Status: DISCONTINUED | OUTPATIENT
Start: 2022-05-23 | End: 2022-05-23

## 2022-05-23 RX ORDER — ONDANSETRON 4 MG/1
4 TABLET, ORALLY DISINTEGRATING ORAL ONCE
Status: DISCONTINUED | OUTPATIENT
Start: 2022-05-23 | End: 2022-05-23 | Stop reason: HOSPADM

## 2022-05-23 RX ORDER — OXYCODONE HYDROCHLORIDE 5 MG/1
5 TABLET ORAL EVERY 4 HOURS PRN
Status: DISCONTINUED | OUTPATIENT
Start: 2022-05-23 | End: 2022-05-23 | Stop reason: HOSPADM

## 2022-05-23 RX ORDER — DIPHENHYDRAMINE HYDROCHLORIDE 50 MG/ML
INJECTION INTRAMUSCULAR; INTRAVENOUS
Status: DISCONTINUED | OUTPATIENT
Start: 2022-05-23 | End: 2022-05-23

## 2022-05-23 RX ORDER — HYDROMORPHONE HYDROCHLORIDE 1 MG/ML
0.2 INJECTION, SOLUTION INTRAMUSCULAR; INTRAVENOUS; SUBCUTANEOUS EVERY 5 MIN PRN
Status: DISCONTINUED | OUTPATIENT
Start: 2022-05-23 | End: 2022-05-23 | Stop reason: HOSPADM

## 2022-05-23 RX ORDER — LIDOCAINE HYDROCHLORIDE 20 MG/ML
INJECTION, SOLUTION EPIDURAL; INFILTRATION; INTRACAUDAL; PERINEURAL
Status: DISCONTINUED | OUTPATIENT
Start: 2022-05-23 | End: 2022-05-23

## 2022-05-23 RX ORDER — BUPIVACAINE HYDROCHLORIDE AND EPINEPHRINE 2.5; 5 MG/ML; UG/ML
INJECTION, SOLUTION EPIDURAL; INFILTRATION; INTRACAUDAL; PERINEURAL
Status: DISCONTINUED | OUTPATIENT
Start: 2022-05-23 | End: 2022-05-23 | Stop reason: HOSPADM

## 2022-05-23 RX ORDER — ONDANSETRON 2 MG/ML
4 INJECTION INTRAMUSCULAR; INTRAVENOUS DAILY PRN
Status: DISCONTINUED | OUTPATIENT
Start: 2022-05-23 | End: 2022-05-23 | Stop reason: HOSPADM

## 2022-05-23 RX ORDER — DEXAMETHASONE SODIUM PHOSPHATE 4 MG/ML
INJECTION, SOLUTION INTRA-ARTICULAR; INTRALESIONAL; INTRAMUSCULAR; INTRAVENOUS; SOFT TISSUE
Status: DISCONTINUED | OUTPATIENT
Start: 2022-05-23 | End: 2022-05-23

## 2022-05-23 RX ORDER — FAMOTIDINE 10 MG/ML
INJECTION INTRAVENOUS
Status: DISCONTINUED | OUTPATIENT
Start: 2022-05-23 | End: 2022-05-23

## 2022-05-23 RX ORDER — ACETAMINOPHEN 10 MG/ML
INJECTION, SOLUTION INTRAVENOUS
Status: DISCONTINUED | OUTPATIENT
Start: 2022-05-23 | End: 2022-05-23

## 2022-05-23 RX ORDER — SCOLOPAMINE TRANSDERMAL SYSTEM 1 MG/1
1 PATCH, EXTENDED RELEASE TRANSDERMAL ONCE
Status: DISCONTINUED | OUTPATIENT
Start: 2022-05-23 | End: 2022-05-23 | Stop reason: HOSPADM

## 2022-05-23 RX ORDER — ESMOLOL HYDROCHLORIDE 10 MG/ML
INJECTION INTRAVENOUS
Status: DISCONTINUED | OUTPATIENT
Start: 2022-05-23 | End: 2022-05-23

## 2022-05-23 RX ORDER — PROCHLORPERAZINE EDISYLATE 5 MG/ML
5 INJECTION INTRAMUSCULAR; INTRAVENOUS EVERY 30 MIN PRN
Status: DISCONTINUED | OUTPATIENT
Start: 2022-05-23 | End: 2022-05-23 | Stop reason: HOSPADM

## 2022-05-23 RX ORDER — SODIUM CHLORIDE 0.9 % (FLUSH) 0.9 %
10 SYRINGE (ML) INJECTION
Status: DISCONTINUED | OUTPATIENT
Start: 2022-05-23 | End: 2022-05-23 | Stop reason: HOSPADM

## 2022-05-23 RX ORDER — DIPHENHYDRAMINE HYDROCHLORIDE 50 MG/ML
12.5 INJECTION INTRAMUSCULAR; INTRAVENOUS
Status: DISCONTINUED | OUTPATIENT
Start: 2022-05-23 | End: 2022-05-23 | Stop reason: HOSPADM

## 2022-05-23 RX ORDER — SUCCINYLCHOLINE CHLORIDE 20 MG/ML
INJECTION INTRAMUSCULAR; INTRAVENOUS
Status: DISCONTINUED | OUTPATIENT
Start: 2022-05-23 | End: 2022-05-23

## 2022-05-23 RX ORDER — PROPOFOL 10 MG/ML
VIAL (ML) INTRAVENOUS
Status: DISCONTINUED | OUTPATIENT
Start: 2022-05-23 | End: 2022-05-23

## 2022-05-23 RX ADMIN — MEPERIDINE HYDROCHLORIDE 12.5 MG: 50 INJECTION INTRAMUSCULAR; INTRAVENOUS; SUBCUTANEOUS at 03:05

## 2022-05-23 RX ADMIN — HYDROMORPHONE HYDROCHLORIDE 0.2 MG: 1 INJECTION, SOLUTION INTRAMUSCULAR; INTRAVENOUS; SUBCUTANEOUS at 02:05

## 2022-05-23 RX ADMIN — DEXAMETHASONE SODIUM PHOSPHATE 8 MG: 4 INJECTION, SOLUTION INTRAMUSCULAR; INTRAVENOUS at 01:05

## 2022-05-23 RX ADMIN — ONDANSETRON 4 MG: 2 INJECTION, SOLUTION INTRAMUSCULAR; INTRAVENOUS at 01:05

## 2022-05-23 RX ADMIN — ACETAMINOPHEN 1000 MG: 10 INJECTION, SOLUTION INTRAVENOUS at 01:05

## 2022-05-23 RX ADMIN — SUCCINYLCHOLINE CHLORIDE 140 MG: 20 INJECTION, SOLUTION INTRAMUSCULAR; INTRAVENOUS at 01:05

## 2022-05-23 RX ADMIN — ROCURONIUM BROMIDE 10 MG: 10 INJECTION, SOLUTION INTRAVENOUS at 01:05

## 2022-05-23 RX ADMIN — LIDOCAINE HYDROCHLORIDE 100 MG: 20 INJECTION, SOLUTION EPIDURAL; INFILTRATION; INTRACAUDAL; PERINEURAL at 01:05

## 2022-05-23 RX ADMIN — SCOPOLAMINE 1 PATCH: 1 PATCH TRANSDERMAL at 01:05

## 2022-05-23 RX ADMIN — SODIUM CHLORIDE: 900 INJECTION INTRAVENOUS at 02:05

## 2022-05-23 RX ADMIN — SODIUM CHLORIDE, SODIUM LACTATE, POTASSIUM CHLORIDE, AND CALCIUM CHLORIDE: .6; .31; .03; .02 INJECTION, SOLUTION INTRAVENOUS at 01:05

## 2022-05-23 RX ADMIN — DIPHENHYDRAMINE HYDROCHLORIDE 6.25 MG: 50 INJECTION, SOLUTION INTRAMUSCULAR; INTRAVENOUS at 01:05

## 2022-05-23 RX ADMIN — FAMOTIDINE 20 MG: 10 INJECTION, SOLUTION INTRAVENOUS at 01:05

## 2022-05-23 RX ADMIN — SUGAMMADEX 200 MG: 100 INJECTION, SOLUTION INTRAVENOUS at 02:05

## 2022-05-23 RX ADMIN — MIDAZOLAM HYDROCHLORIDE 2 MG: 1 INJECTION, SOLUTION INTRAMUSCULAR; INTRAVENOUS at 01:05

## 2022-05-23 RX ADMIN — PROPOFOL 150 MG: 10 INJECTION, EMULSION INTRAVENOUS at 01:05

## 2022-05-23 RX ADMIN — FENTANYL CITRATE 100 MCG: 50 INJECTION, SOLUTION INTRAMUSCULAR; INTRAVENOUS at 01:05

## 2022-05-23 RX ADMIN — ROCURONIUM BROMIDE 20 MG: 10 INJECTION, SOLUTION INTRAVENOUS at 01:05

## 2022-05-23 RX ADMIN — ESMOLOL HYDROCHLORIDE 30 MG: 10 INJECTION, SOLUTION INTRAVENOUS at 01:05

## 2022-05-23 RX ADMIN — LIDOCAINE HYDROCHLORIDE 5 ML: 20 JELLY TOPICAL at 01:05

## 2022-05-23 RX ADMIN — CEFOXITIN SODIUM 2 G: 2 INJECTION, SOLUTION INTRAVENOUS at 01:05

## 2022-05-23 NOTE — DISCHARGE INSTRUCTIONS
DISCHARGE INSTRUCTIONS    No driving, operating heavy machinery or signing legal documents x 24 hours  No drinking alcohol x 24 hours or while taking pain medication  You should not be driving while taking pain medication  May remove dressing in 48 hours and shower. Leave wound open to air after.  Do Not submerge wound in a tub, any pools of water or swimming pools until wound is completely healed  Keep an eye on wound- edges should be pink and well approximated-  the wound should not be red and inflamed.  Wound edges should not be .   Your wound should not be draining anything green, yellow or foul smelling- for these call the MD  You should not be running a temp greater than 101

## 2022-05-23 NOTE — BRIEF OP NOTE
[facility]  Brief Operative Note    SUMMARY     Surgery Date: 5/23/2022     Surgeon(s) and Role:     * Garth Hurley III, MD - Primary    Assisting Surgeon: None    Pre-op Diagnosis:  Chronic calculous cholecystitis [K80.10]    Post-op Diagnosis:  Post-Op Diagnosis Codes:     * Chronic calculous cholecystitis [K80.10]    Procedure(s) (LRB):  CHOLECYSTECTOMY, LAPAROSCOPIC (N/A)    Anesthesia: General    Operative Findings: The patient was brought to the operating room and transferred to the operating room table in the supine position.  Patient was given general anesthesia and intubated.  The abdomen was prepped and draped.  A transverse infraumbilical incision was made.  Dissection was carried down through the skin and subcutaneous tissue.  The abdomen was insufflated with the Veress needle. The abdomen was entered with the 5 mm visiport. Under direct visualization, three ports were placed.  One 12 mm was placed in the subxiphoid position, one 5 mm in the right anterior axillary line and the other 5 mm in the mid clavicular line.  The gallbladder body was retracted superiorly and the infundibulum was retracted laterally.  The peritoneum overlying the cystic duct and artery was carefully taken down.  The cystic duct and cystic artery were individually isolated and dissected free 360 degrees.  They were individually clipped proximally and distally.  They were transected using endo dafne.  Electrocautery was used to take the gallbladder off the liver bed.  The Endo-Catch bag was used to remove the gallbladder from the abdomen.  We irrigated out the right upper quadrant with suction .  We checked again and there was no evidence of any bile leak or bleeding from our clips or from the liver bed.  The patient tolerated the procedure well.  We removed all of our ports. We repaired the skin with 4-0 Monocryl.  After that was done, the patient was extubated and taken to the recovery room in stable  condition.  All lap and instrument counts were correct.    Estimated Blood Loss: 5 mL    Estimated Blood Loss has been documented.   Complications none         Specimens:   Specimen (24h ago, onward)             Start     Ordered    05/23/22 1355  Specimen to Pathology - Surgery  Once        Comments: Pre-op Diagnosis: Chronic calculous cholecystitis [K80.10]Procedure(s):CHOLECYSTECTOMY, LAPAROSCOPIC Number of specimens: 1Name of specimens: 1 gallbladder     Question:  Release to patient  Answer:  Immediate    05/23/22 9569                [id number]

## 2022-05-23 NOTE — ANESTHESIA PREPROCEDURE EVALUATION
05/23/2022  Jane Garcia is a 31 y.o., female.      Patient Active Problem List   Diagnosis   (none) - all problems resolved or deleted       Past Surgical History:   Procedure Laterality Date    HERNIA REPAIR          Tobacco Use:  The patient  reports that she has never smoked. She has never used smokeless tobacco.     Results for orders placed or performed during the hospital encounter of 05/20/22   EKG 12-lead    Collection Time: 05/20/22 10:50 AM    Narrative    Test Reason : K80.10,    Vent. Rate : 077 BPM     Atrial Rate : 077 BPM     P-R Int : 130 ms          QRS Dur : 082 ms      QT Int : 384 ms       P-R-T Axes : 072 078 057 degrees     QTc Int : 434 ms    Normal sinus rhythm with sinus arrhythmia  Normal ECG  When compared with ECG of 22-NOV-2020 13:08,  No significant change was found    Referred By:             Confirmed By:              Lab Results   Component Value Date    WBC 5.48 05/20/2022    HGB 10.2 (L) 05/20/2022    HCT 34.2 (L) 05/20/2022    MCV 80 (L) 05/20/2022     (H) 05/20/2022     BMP  Lab Results   Component Value Date     05/20/2022    K 3.6 05/20/2022     05/20/2022    CO2 26 05/20/2022    BUN 8 05/20/2022    CREATININE 0.7 05/20/2022    CALCIUM 9.2 05/20/2022    ANIONGAP 8 05/20/2022    GLU 86 05/20/2022    GLU 89 03/26/2022    GLU 97 11/22/2020       No results found for this or any previous visit.   05/20/22 11:28   Preg Test, Ur Negative         Pre-op Assessment    I have reviewed the Patient Summary Reports.     I have reviewed the Nursing Notes. I have reviewed the NPO Status.   I have reviewed the Medications.     Review of Systems  Anesthesia Hx:  No problems with previous Anesthesia Denies Hx of Anesthetic complications  Denies Family Hx of Anesthesia complications.   Denies Personal Hx of Anesthesia complications.   Social:  No Alcohol Use,  Non-Smoker    Hematology/Oncology:     Oncology Normal    -- Anemia:   EENT/Dental:EENT/Dental Normal   Cardiovascular:  Cardiovascular Normal  ECG has been reviewed.    Pulmonary:   Proventil inhaler use as needed with these approximately every 3 days  Patient used this a.m. for anxiety no wheezing at that time   Renal/:  Renal/ Normal     Hepatic/GI:  Hepatic/GI Normal    Musculoskeletal:  Musculoskeletal Normal    Neurological:  Neurology Normal    Endocrine:  Endocrine Normal    Dermatological:  Skin Normal    Psych:   anxiety          Physical Exam  General: Well nourished, Cooperative, Alert and Oriented    Airway:  Mallampati: III   Mouth Opening: Normal  TM Distance: Normal  Tongue: Normal  Neck ROM: Normal ROM    Dental:    Chest/Lungs:  Clear to auscultation, Normal Respiratory Rate    Heart:  Rate: Normal  Rhythm: Regular Rhythm  Sounds: Normal    Abdomen:  Normal, Soft, Nontender        Anesthesia Plan  Type of Anesthesia, risks & benefits discussed:    Anesthesia Type: Gen ETT  Intra-op Monitoring Plan: Standard ASA Monitors  Post Op Pain Control Plan: multimodal analgesia and IV/PO Opioids PRN  Induction:  IV  Airway Plan: Direct and Video, Post-Induction  Informed Consent: Informed consent signed with the Patient and all parties understand the risks and agree with anesthesia plan.  All questions answered.   ASA Score: 2  Anesthesia Plan Notes:       GETA   Benadryl 6.25 mg iv, Decadron 8 mg iv, Zofran 4 mg iv, Pepcid 20 mg iv   Ofirmev 1000 mg iv, Scopolamine Patch, Sugammadex     Ready For Surgery From Anesthesia Perspective.     .

## 2022-05-23 NOTE — DISCHARGE SUMMARY
Discharge Summary  General Surgery      Admit Date: 5/23/2022    Discharge Date :5/23/2022    Attending Physician: Garth Hurley III     Discharge Physician: Garth Hurley III    Discharged Condition: good    Discharge Diagnosis: Chronic calculous cholecystitis [K80.10]    Treatments/Procedures: Procedure(s) (LRB):  CHOLECYSTECTOMY, LAPAROSCOPIC (N/A)    Hospital Course: Uneventful; Discharged home from Recovery    Significant Diagnostic Studies: none    Disposition: Home or Self Care    Diet: Regular    Follow up: Office 10-14 days    Activity: No heavy lifting till seen in office.    Patient Instructions:   Current Discharge Medication List      START taking these medications    Details   !! HYDROcodone-acetaminophen (NORCO) 5-325 mg per tablet Take 1 tablet by mouth every 6 (six) hours as needed for Pain.  Qty: 20 tablet, Refills: 0    Comments: Quantity prescribed more than 7 day supply? No       !! - Potential duplicate medications found. Please discuss with provider.      CONTINUE these medications which have NOT CHANGED    Details   albuterol (PROVENTIL/VENTOLIN HFA) 90 mcg/actuation inhaler Inhale 2 puffs into the lungs every 6 (six) hours as needed for Wheezing. Rescue She states her mom gave her this inhaler      fluticasone propionate (FLONASE) 50 mcg/actuation nasal spray 2 sprays (100 mcg total) by Each Nostril route once daily.  Qty: 15.8 mL, Refills: 0    Associated Diagnoses: Postnasal drip; Allergic rhinitis, unspecified seasonality, unspecified trigger      benzonatate (TESSALON) 100 MG capsule TAKE 1 CAPSULE BY MOUTH THREE TIMES DAILY FOR 10 DAYS AS NEEDED FOR COUGH      cetirizine (ZYRTEC) 10 MG tablet Take 1 tablet (10 mg total) by mouth every evening.  Qty: 90 tablet, Refills: 1    Associated Diagnoses: Postnasal drip; Allergic rhinitis, unspecified seasonality, unspecified trigger      !! HYDROcodone-acetaminophen (NORCO) 5-325 mg per tablet Take 1 tablet by mouth every 6 (six) hours  as needed for Pain.  Qty: 10 tablet, Refills: 0      ibuprofen (ADVIL,MOTRIN) 600 MG tablet Take 1 tablet (600 mg total) by mouth every 6 (six) hours as needed (cramping).  Qty: 20 tablet, Refills: 2      ondansetron (ZOFRAN-ODT) 4 MG TbDL Take 1 tablet (4 mg total) by mouth every 8 (eight) hours as needed (nausea and emesis).  Qty: 20 tablet, Refills: 0       !! - Potential duplicate medications found. Please discuss with provider.      STOP taking these medications       loratadine (CLARITIN) 10 mg tablet Comments:   Reason for Stopping:               Discharge Procedure Orders   Diet Adult Regular     Lifting restrictions   Order Comments: No lifting over twenty pounds for six weeks     Remove dressing in 48 hours

## 2022-05-23 NOTE — ANESTHESIA POSTPROCEDURE EVALUATION
Anesthesia Post Evaluation    Patient: Jane Garcia    Procedure(s) Performed: Procedure(s) (LRB):  CHOLECYSTECTOMY, LAPAROSCOPIC (N/A)    Final Anesthesia Type: general      Patient location during evaluation: PACU  Patient participation: Yes- Able to Participate  Level of consciousness: awake and alert and oriented  Post-procedure vital signs: reviewed and stable  Pain management: adequate  Airway patency: patent    PONV status at discharge: No PONV  Anesthetic complications: no      Cardiovascular status: blood pressure returned to baseline and hemodynamically stable  Respiratory status: unassisted, spontaneous ventilation and room air  Hydration status: euvolemic  Follow-up not needed.          Vitals Value Taken Time   /77 05/23/22 1545   Temp 36.4 05/23/22 1554   Pulse 83 05/23/22 1552   Resp 14 05/23/22 1552   SpO2 98 % 05/23/22 1552   Vitals shown include unvalidated device data.      No case tracking events are documented in the log.      Pain/Christopher Score: Pain Rating Prior to Med Admin: 7 (5/23/2022  3:12 PM)

## 2022-05-23 NOTE — OP NOTE
Surgery Date: 5/23/2022     Surgeon(s) and Role:     * Garth Hurley III, MD - Primary    Assisting Surgeon: None    Pre-op Diagnosis:  Chronic calculous cholecystitis [K80.10]    Post-op Diagnosis:  Post-Op Diagnosis Codes:     * Chronic calculous cholecystitis [K80.10]    Procedure(s) (LRB):  CHOLECYSTECTOMY, LAPAROSCOPIC (N/A)    Anesthesia: General    Operative Findings: The patient was brought to the operating room and transferred to the operating room table in the supine position.  Patient was given general anesthesia and intubated.  The abdomen was prepped and draped.  A transverse infraumbilical incision was made.  Dissection was carried down through the skin and subcutaneous tissue.  The abdomen was insufflated with the Veress needle. The abdomen was entered with the 5 mm visiport. Under direct visualization, three ports were placed.  One 12 mm was placed in the subxiphoid position, one 5 mm in the right anterior axillary line and the other 5 mm in the mid clavicular line.  The gallbladder body was retracted superiorly and the infundibulum was retracted laterally.  The peritoneum overlying the cystic duct and artery was carefully taken down.  The cystic duct and cystic artery were individually isolated and dissected free 360 degrees.  They were individually clipped proximally and distally.  They were transected using endo dafne.  Electrocautery was used to take the gallbladder off the liver bed.  The Endo-Catch bag was used to remove the gallbladder from the abdomen.  We irrigated out the right upper quadrant with suction .  We checked again and there was no evidence of any bile leak or bleeding from our clips or from the liver bed.  The patient tolerated the procedure well.  We removed all of our ports. We repaired the skin with 4-0 Monocryl.  After that was done, the patient was extubated and taken to the recovery room in stable condition.  All lap and instrument counts were  correct.    Estimated Blood Loss: 5 mL    Estimated Blood Loss has been documented.   Complications none         Specimens:   Specimen (24h ago, onward)             Start     Ordered    05/23/22 2672  Specimen to Pathology - Surgery  Once        Comments: Pre-op Diagnosis: Chronic calculous cholecystitis [K80.10]Procedure(s):CHOLECYSTECTOMY, LAPAROSCOPIC Number of specimens: 1Name of specimens: 1 gallbladder     Question:  Release to patient  Answer:  Immediate    05/23/22 1689                [id number]

## 2022-05-23 NOTE — TRANSFER OF CARE
"Anesthesia Transfer of Care Note    Patient: Jane Garcia    Procedure(s) Performed: Procedure(s) (LRB):  CHOLECYSTECTOMY, LAPAROSCOPIC (N/A)    Patient location: PACU    Anesthesia Type: general    Transport from OR: Transported from OR on room air with adequate spontaneous ventilation    Post pain: adequate analgesia    Post assessment: no apparent anesthetic complications and tolerated procedure well    Post vital signs: stable    Level of consciousness: awake and oriented    Nausea/Vomiting: no nausea/vomiting    Complications: none    Transfer of care protocol was followed      Last vitals:   Visit Vitals  BP (!) 144/85 (BP Location: Right arm, Patient Position: Lying)   Pulse 98   Temp 37.1 °C (98.8 °F) (Temporal)   Resp 18   Ht 5' 7" (1.702 m)   Wt 81.2 kg (179 lb)   LMP 05/06/2022   SpO2 100%   Breastfeeding No   BMI 28.04 kg/m²     "

## 2022-05-23 NOTE — H&P
Patient ID: Jane Garcia is a 31 y.o. female.     Chief Complaint: Consult (gallstones)        HPI:update to H and P below. No change in H and P   31 year old female referred to the office with symptomatic gallstones. She had sudden onset of RUQ pain and nausea several weeks ago. She went to the ED. Was found to have gallstones including 1 cm stone near the neck. LFT's OK. She was discharged home and has been having daily mild symptoms with meals. She has no fever or chills. She has past history of umbilical hernia repair at age 3.           Past Medical History:   Diagnosis Date    Allergy              Past Surgical History:   Procedure Laterality Date    HERNIA REPAIR          Review of patient's allergies indicates:  No Known Allergies       Medication List with Changes/Refills   Current Medications     ALBUTEROL (PROVENTIL/VENTOLIN HFA) 90 MCG/ACTUATION INHALER    Inhale 2 puffs into the lungs every 6 (six) hours as needed for Wheezing. Rescue She states her mom gave her this inhaler     BENZONATATE (TESSALON) 100 MG CAPSULE    TAKE 1 CAPSULE BY MOUTH THREE TIMES DAILY FOR 10 DAYS AS NEEDED FOR COUGH     CETIRIZINE (ZYRTEC) 10 MG TABLET    Take 1 tablet (10 mg total) by mouth every evening.     FLUTICASONE PROPIONATE (FLONASE) 50 MCG/ACTUATION NASAL SPRAY    2 sprays (100 mcg total) by Each Nostril route once daily.     HYDROCODONE-ACETAMINOPHEN (NORCO) 5-325 MG PER TABLET    Take 1 tablet by mouth every 6 (six) hours as needed for Pain.     IBUPROFEN (ADVIL,MOTRIN) 600 MG TABLET    Take 1 tablet (600 mg total) by mouth every 6 (six) hours as needed (cramping).     ONDANSETRON (ZOFRAN-ODT) 4 MG TBDL    Take 1 tablet (4 mg total) by mouth every 8 (eight) hours as needed (nausea and emesis).            Family History   Problem Relation Age of Onset    Hypertension Mother      Cancer Maternal Grandmother      Hypertension Maternal Grandmother      Hypertension Father      Hypertension Maternal Aunt       Hypertension Maternal Uncle      No Known Problems Paternal Uncle      Heart disease Maternal Grandfather        Social History            Socioeconomic History    Marital status:    Tobacco Use    Smoking status: Never Smoker    Smokeless tobacco: Never Used   Substance and Sexual Activity    Alcohol use: Not Currently    Drug use: No    Sexual activity: Yes       Partners: Male   Social History Narrative     ** Merged History Encounter **                  Review of Systems   Constitutional: Negative for appetite change, chills, fever and unexpected weight change.   HENT: Negative for hearing loss, rhinorrhea, sore throat and voice change.    Eyes: Negative for photophobia and visual disturbance.   Respiratory: Negative for cough, choking and shortness of breath.    Cardiovascular: Negative for chest pain, palpitations and leg swelling.   Gastrointestinal: Positive for abdominal pain and nausea. Negative for blood in stool, constipation, diarrhea and vomiting.   Endocrine: Negative for cold intolerance, heat intolerance, polydipsia and polyuria.   Musculoskeletal: Negative for arthralgias, back pain, joint swelling and neck stiffness.   Skin: Negative for color change, pallor and rash.   Neurological: Negative for dizziness, seizures, syncope and headaches.   Hematological: Negative for adenopathy. Does not bruise/bleed easily.   Psychiatric/Behavioral: Negative for agitation, behavioral problems and confusion.       Objective:   Physical Exam  Constitutional:       General: She is not in acute distress.     Appearance: Normal appearance. She is well-developed. She is not toxic-appearing.   HENT:      Head: Normocephalic and atraumatic. No abrasion or laceration.      Right Ear: External ear normal.      Left Ear: External ear normal.      Nose: Nose normal.   Eyes:      Pupils: Pupils are equal, round, and reactive to light.   Neck:      Trachea: Trachea and phonation normal. No tracheal  deviation.   Cardiovascular:      Rate and Rhythm: Normal rate and regular rhythm.   Pulmonary:      Effort: Pulmonary effort is normal. No tachypnea, accessory muscle usage or respiratory distress.   Abdominal:      General: There is no distension.      Palpations: Abdomen is soft.      Tenderness: There is no abdominal tenderness. There is no guarding or rebound.   Musculoskeletal:      Cervical back: Neck supple. Normal range of motion.   Lymphadenopathy:      Cervical: No cervical adenopathy.   Skin:     General: Skin is warm.      Coloration: Skin is not jaundiced.   Neurological:      Mental Status: She is alert and oriented to person, place, and time.      Coordination: Coordination normal.      Gait: Gait normal.   Psychiatric:         Speech: Speech normal.         Behavior: Behavior normal.          Assessment/Plan:   Jane KULKARNI was seen today for consult.     Diagnoses and all orders for this visit:     Chronic calculous cholecystitis  -     Case Request Operating Room: CHOLECYSTECTOMY, LAPAROSCOPIC  -     Comprehensive metabolic panel; Future  -     CBC auto differential; Future  -     EKG 12-lead; Future  -     X-Ray Chest PA And Lateral; Future     Other orders  -     Full code; Standing  -     Vital signs; Standing  -     Insert peripheral IV; Standing  -     Diet NPO; Standing  -     IP VTE LOW RISK PATIENT; Standing  -     Place sequential compression device; Standing  -     Pulse Oximetry Q4H; Standing  -     Place in Outpatient; Standing  -     ceFOXItin (MEFOXIN) 2 g in dextrose 5 % 50 mL IVPB  -     Full code  -     Insert peripheral IV        Plan for cholecystectomy May 23.   I discussed the proposed procedures with the patient including risks, benefits, indications, alternatives and special concerns.  The patient appears to understand and agrees to go ahead with surgery.  I have made no promises, warranties or verbal agreements beyond what was discussed above.

## 2022-05-23 NOTE — ANESTHESIA PROCEDURE NOTES
Intubation    Date/Time: 5/23/2022 1:20 PM  Performed by: Ray Nelson CRNA  Authorized by: Timi Rowe MD     Intubation:     Induction:  Intravenous    Intubated:  Postinduction    Mask Ventilation:  N/a    Attempts:  1    Attempted By:  CRNA    Method of Intubation:  Video laryngoscopy    Blade:  Rowe 2    Laryngeal View Grade: Grade I - full view of cords      Difficult Airway Encountered?: No      Complications:  None    Airway Device:  Oral endotracheal tube    Airway Device Size:  7.5    Style/Cuff Inflation:  Cuffed    Tube secured:  22    Secured at:  The lips    Placement Verified By:  Capnometry    Complicating Factors:  None    Findings Post-Intubation:  BS equal bilateral and atraumatic/condition of teeth unchanged

## 2022-05-25 ENCOUNTER — NURSE TRIAGE (OUTPATIENT)
Dept: ADMINISTRATIVE | Facility: CLINIC | Age: 32
End: 2022-05-25
Payer: MEDICAID

## 2022-05-25 NOTE — TELEPHONE ENCOUNTER
"Spoke with patient states her gallbladder was removed on Monday 5/23. Patient reports having spasms and tightness on the left side of her incision.  Patient states pain is similar to a "hemanth horse".   Patient currently taking hydrocodone as ordered.  Last dose 2 pm.  Patient rates her current pain 5/10.  Pain is worsened when she is laying down.  No fever or signs of infection to incision.  Last bowel movement was on 5/23.  Patient passing gas denies having the urge to go.  States her abdomen is more swollen than usual.   Spoke with on call provider Dr. Riggins whom states patient should go to ER if she is concerned or if her symptoms worsen.  Dr. Riggins states if patient does not have severe symptoms she can follow up in clinic tomorrow.  Message sent to Dr. Riggins per his request to follow up with clinic visit for tomorrow.  Patient given information and verbalized she will go to ER if her symptoms worsen.      Reason for Disposition   [1] Post-op pain AND [2] not controlled with pain medications    Additional Information   Negative: [1] Major abdominal surgical incision AND [2] wound gaping open AND [3] visible internal organs   Negative: Sounds like a life-threatening emergency to the triager   Negative: [1] Bleeding from incision AND [2] won't stop after 10 minutes of direct pressure   Negative: [1] Widespread rash AND [2] bright red, sunburn-like   Negative: Severe pain in the incision   Negative: [1] Incision gaping open AND [2] < 48 hours since wound re-opened   Negative: [1] Incision gaping open AND [2] length of opening > 2 inches (5 cm)   Negative: Patient sounds very sick or weak to the triager   Negative: Sounds like a serious complication to the triager   Negative: Fever > 100.4 F (38.0 C)   Negative: [1] Incision looks infected (spreading redness, pain) AND [2] fever > 99.5 F (37.5 C)   Negative: [1] Incision looks infected (spreading redness, pain) AND [2] large red area " (> 2 in. or 5 cm)   Negative: [1] Incision looks infected (spreading redness, pain) AND [2] face wound   Negative: [1] Red streak runs from the incision AND [2] longer than 1 inch (2.5 cm)   Negative: [1] Pus or bad-smelling fluid draining from incision AND [2] no fever    Protocols used: POST-OP INCISION SYMPTOMS AND [id number]

## 2022-05-26 ENCOUNTER — TELEPHONE (OUTPATIENT)
Dept: SURGERY | Facility: CLINIC | Age: 32
End: 2022-05-26
Payer: MEDICAID

## 2022-05-26 NOTE — TELEPHONE ENCOUNTER
----- Message from iNsha Jones sent at 5/26/2022 10:27 AM CDT -----  Type: Patient Call Back         Who called:Pt          What is the request in detail: pt called in regarding Bladder pain on left side of her incision          Can the clinic reply by [facility]?no          Would the patient rather a call back or a response via [facility]?call back          Best call back number:[phone] (mobile)          Additional Information:           Thank You

## 2022-05-26 NOTE — TELEPHONE ENCOUNTER
Spoke with Patient reports pain in upper  Abdomen asked if normal. Reviewed post op symptoms. Which  Includes abdominal . Suggested patient notify clinic if it increases or she can go to Emergency Room to be evaluated understanding Verbalized.

## 2022-06-07 ENCOUNTER — OFFICE VISIT (OUTPATIENT)
Dept: SURGERY | Facility: CLINIC | Age: 32
End: 2022-06-07
Payer: MEDICAID

## 2022-06-07 VITALS
TEMPERATURE: 98 F | DIASTOLIC BLOOD PRESSURE: 82 MMHG | WEIGHT: 179 LBS | BODY MASS INDEX: 28.09 KG/M2 | SYSTOLIC BLOOD PRESSURE: 128 MMHG | HEART RATE: 80 BPM | RESPIRATION RATE: 16 BRPM | HEIGHT: 67 IN

## 2022-06-07 DIAGNOSIS — Z98.890 POST-OPERATIVE STATE: Primary | ICD-10-CM

## 2022-06-07 PROCEDURE — 3008F BODY MASS INDEX DOCD: CPT | Mod: CPTII,S$GLB,, | Performed by: PHYSICIAN ASSISTANT

## 2022-06-07 PROCEDURE — 99024 PR POST-OP FOLLOW-UP VISIT: ICD-10-PCS | Mod: S$GLB,,, | Performed by: PHYSICIAN ASSISTANT

## 2022-06-07 PROCEDURE — 1159F PR MEDICATION LIST DOCUMENTED IN MEDICAL RECORD: ICD-10-PCS | Mod: CPTII,S$GLB,, | Performed by: PHYSICIAN ASSISTANT

## 2022-06-07 PROCEDURE — 99024 POSTOP FOLLOW-UP VISIT: CPT | Mod: S$GLB,,, | Performed by: PHYSICIAN ASSISTANT

## 2022-06-07 PROCEDURE — 3079F DIAST BP 80-89 MM HG: CPT | Mod: CPTII,S$GLB,, | Performed by: PHYSICIAN ASSISTANT

## 2022-06-07 PROCEDURE — 1159F MED LIST DOCD IN RCRD: CPT | Mod: CPTII,S$GLB,, | Performed by: PHYSICIAN ASSISTANT

## 2022-06-07 PROCEDURE — 3074F PR MOST RECENT SYSTOLIC BLOOD PRESSURE < 130 MM HG: ICD-10-PCS | Mod: CPTII,S$GLB,, | Performed by: PHYSICIAN ASSISTANT

## 2022-06-07 PROCEDURE — 3074F SYST BP LT 130 MM HG: CPT | Mod: CPTII,S$GLB,, | Performed by: PHYSICIAN ASSISTANT

## 2022-06-07 PROCEDURE — 3079F PR MOST RECENT DIASTOLIC BLOOD PRESSURE 80-89 MM HG: ICD-10-PCS | Mod: CPTII,S$GLB,, | Performed by: PHYSICIAN ASSISTANT

## 2022-06-07 PROCEDURE — 3008F PR BODY MASS INDEX (BMI) DOCUMENTED: ICD-10-PCS | Mod: CPTII,S$GLB,, | Performed by: PHYSICIAN ASSISTANT

## 2022-06-07 NOTE — PROGRESS NOTES
Jane Garcia is status post lap charis and presents today for follow-up care.  She has the following systemic symptoms or complaints: no complaints.     PE: Abdomen is soft, non-tender, non-distended.  Incisions clean, dry, and intact. No signs of infection.    Pathology: reviewed     A/P:  Normal post-operative course.    The patient is instructed to avoid heavy lifting until 4 weeks after the surgery date.  Return to clinic as needed

## 2023-01-05 ENCOUNTER — OFFICE VISIT (OUTPATIENT)
Dept: FAMILY MEDICINE | Facility: CLINIC | Age: 33
End: 2023-01-05
Payer: MEDICAID

## 2023-01-05 VITALS
TEMPERATURE: 99 F | DIASTOLIC BLOOD PRESSURE: 82 MMHG | OXYGEN SATURATION: 99 % | HEIGHT: 67 IN | BODY MASS INDEX: 28.25 KG/M2 | SYSTOLIC BLOOD PRESSURE: 128 MMHG | WEIGHT: 180 LBS | HEART RATE: 75 BPM

## 2023-01-05 DIAGNOSIS — J30.1 ALLERGIC RHINITIS DUE TO POLLEN, UNSPECIFIED SEASONALITY: Primary | ICD-10-CM

## 2023-01-05 DIAGNOSIS — R06.02 SOB (SHORTNESS OF BREATH): ICD-10-CM

## 2023-01-05 PROCEDURE — 99213 PR OFFICE/OUTPT VISIT, EST, LEVL III, 20-29 MIN: ICD-10-PCS | Mod: S$PBB,,, | Performed by: NURSE PRACTITIONER

## 2023-01-05 PROCEDURE — 3079F DIAST BP 80-89 MM HG: CPT | Mod: CPTII,,, | Performed by: NURSE PRACTITIONER

## 2023-01-05 PROCEDURE — 3079F PR MOST RECENT DIASTOLIC BLOOD PRESSURE 80-89 MM HG: ICD-10-PCS | Mod: CPTII,,, | Performed by: NURSE PRACTITIONER

## 2023-01-05 PROCEDURE — 3008F BODY MASS INDEX DOCD: CPT | Mod: CPTII,,, | Performed by: NURSE PRACTITIONER

## 2023-01-05 PROCEDURE — 3008F PR BODY MASS INDEX (BMI) DOCUMENTED: ICD-10-PCS | Mod: CPTII,,, | Performed by: NURSE PRACTITIONER

## 2023-01-05 PROCEDURE — 1160F RVW MEDS BY RX/DR IN RCRD: CPT | Mod: CPTII,,, | Performed by: NURSE PRACTITIONER

## 2023-01-05 PROCEDURE — 1160F PR REVIEW ALL MEDS BY PRESCRIBER/CLIN PHARMACIST DOCUMENTED: ICD-10-PCS | Mod: CPTII,,, | Performed by: NURSE PRACTITIONER

## 2023-01-05 PROCEDURE — 3074F PR MOST RECENT SYSTOLIC BLOOD PRESSURE < 130 MM HG: ICD-10-PCS | Mod: CPTII,,, | Performed by: NURSE PRACTITIONER

## 2023-01-05 PROCEDURE — 99213 OFFICE O/P EST LOW 20 MIN: CPT | Mod: S$PBB,,, | Performed by: NURSE PRACTITIONER

## 2023-01-05 PROCEDURE — 1159F PR MEDICATION LIST DOCUMENTED IN MEDICAL RECORD: ICD-10-PCS | Mod: CPTII,,, | Performed by: NURSE PRACTITIONER

## 2023-01-05 PROCEDURE — 99214 OFFICE O/P EST MOD 30 MIN: CPT | Performed by: NURSE PRACTITIONER

## 2023-01-05 PROCEDURE — 1159F MED LIST DOCD IN RCRD: CPT | Mod: CPTII,,, | Performed by: NURSE PRACTITIONER

## 2023-01-05 PROCEDURE — 3074F SYST BP LT 130 MM HG: CPT | Mod: CPTII,,, | Performed by: NURSE PRACTITIONER

## 2023-01-05 RX ORDER — ALBUTEROL SULFATE 90 UG/1
2 AEROSOL, METERED RESPIRATORY (INHALATION) EVERY 6 HOURS PRN
Qty: 18 G | Refills: 2 | Status: SHIPPED | OUTPATIENT
Start: 2023-01-05 | End: 2024-01-30 | Stop reason: SDUPTHER

## 2023-01-05 RX ORDER — AZELASTINE 1 MG/ML
1 SPRAY, METERED NASAL 2 TIMES DAILY
Qty: 30 ML | Refills: 2 | Status: SHIPPED | OUTPATIENT
Start: 2023-01-05 | End: 2023-08-24

## 2023-01-05 NOTE — PROGRESS NOTES
Subjective:       Patient ID: Jane Garcia is a 32 y.o. female.    Chief Complaint: post nasal drip    Sinus Problem  This is a new problem. The current episode started 1 to 4 weeks ago. The problem has been waxing and waning since onset. There has been no fever. The pain is moderate. Associated symptoms include shortness of breath and sinus pressure. Pertinent negatives include no chills, congestion, coughing, diaphoresis, ear pain, headaches, hoarse voice, neck pain, sneezing, sore throat or swollen glands. (Post nasal drainage) Past treatments include saline sprays and lying down. The treatment provided mild relief.   Shortness of Breath  This is a recurrent problem. The current episode started more than 1 month ago. The problem occurs intermittently. The problem has been waxing and waning. Associated symptoms include PND. Pertinent negatives include no chest pain, ear pain, headaches, neck pain, rhinorrhea, sore throat, sputum production, swollen glands, vomiting or wheezing. The symptoms are aggravated by weather changes. She has tried rest and beta agonist inhalers for the symptoms. The treatment provided significant relief.   Review of Systems   Constitutional:  Negative for activity change, chills, diaphoresis and unexpected weight change.   HENT:  Positive for postnasal drip and sinus pressure. Negative for congestion, ear pain, hearing loss, hoarse voice, rhinorrhea, sneezing, sore throat and trouble swallowing.    Eyes:  Negative for discharge and visual disturbance.   Respiratory:  Positive for shortness of breath. Negative for cough, sputum production, chest tightness and wheezing.    Cardiovascular:  Positive for PND. Negative for chest pain and palpitations.   Gastrointestinal:  Negative for blood in stool, constipation, diarrhea and vomiting.   Endocrine: Negative for polydipsia and polyuria.   Genitourinary:  Negative for difficulty urinating, dysuria, hematuria and menstrual problem.  "  Musculoskeletal:  Negative for arthralgias, joint swelling and neck pain.   Neurological:  Negative for weakness and headaches.   Psychiatric/Behavioral:  Negative for confusion and dysphoric mood.      Past Medical History:   Diagnosis Date    Allergy     Anxiety       Past Surgical History:   Procedure Laterality Date    HERNIA REPAIR      LAPAROSCOPIC CHOLECYSTECTOMY N/A 5/23/2022    Procedure: CHOLECYSTECTOMY, LAPAROSCOPIC;  Surgeon: Garth Hurley III, MD;  Location: [facility];  Service: General;  Laterality: N/A;       Family History   Problem Relation Age of Onset    Hypertension Mother     Cancer Maternal Grandmother     Hypertension Maternal Grandmother     Hypertension Father     Hypertension Maternal Aunt     Hypertension Maternal Uncle     No Known Problems Paternal Uncle     Heart disease Maternal Grandfather        Social History     Socioeconomic History    Marital status:    Tobacco Use    Smoking status: Never    Smokeless tobacco: Never   Substance and Sexual Activity    Alcohol use: Not Currently    Drug use: No    Sexual activity: Yes     Partners: Male   Social History Narrative    ** Merged History Encounter **            Current Outpatient Medications   Medication Sig Dispense Refill    fluticasone propionate (FLONASE) 50 mcg/actuation nasal spray 2 sprays (100 mcg total) by Each Nostril route once daily. 15.8 mL 0    albuterol (PROVENTIL/VENTOLIN HFA) 90 mcg/actuation inhaler Inhale 2 puffs into the lungs every 6 (six) hours as needed for Wheezing. Rescue She states her mom gave her this inhaler 18 g 2    azelastine (ASTELIN) 137 mcg (0.1 %) nasal spray 1 spray (137 mcg total) by Nasal route 2 (two) times daily. 30 mL 2     No current facility-administered medications for this visit.       Review of patient's allergies indicates:  No Known Allergies  Objective:      Blood pressure 128/82, pulse 75, temperature 98.8 °F (37.1 °C), height 5' 7" (1.702 m), weight 81.6 kg (180 lb), last " menstrual period 01/03/2023, SpO2 99 %. Body mass index is 28.19 kg/m².   Physical Exam  Vitals and nursing note reviewed.   Constitutional:       General: She is not in acute distress.     Appearance: Normal appearance. She is well-developed.   HENT:      Head: Normocephalic and atraumatic.      Right Ear: Tympanic membrane, ear canal and external ear normal.      Left Ear: Tympanic membrane, ear canal and external ear normal.      Nose: Rhinorrhea present. Rhinorrhea is clear.      Right Sinus: No maxillary sinus tenderness or frontal sinus tenderness.      Left Sinus: No maxillary sinus tenderness or frontal sinus tenderness.      Mouth/Throat:      Mouth: Mucous membranes are moist.      Pharynx: Uvula midline. Oropharyngeal exudate (clear pnd) present. No pharyngeal swelling or posterior oropharyngeal erythema.   Eyes:      General: Lids are normal. Lids are everted, no foreign bodies appreciated.      Conjunctiva/sclera: Conjunctivae normal.      Pupils: Pupils are equal, round, and reactive to light.      Right eye: Pupil is round and reactive.      Left eye: Pupil is round and reactive.   Neck:      Trachea: Trachea normal.   Cardiovascular:      Rate and Rhythm: Normal rate and regular rhythm.      Pulses: Normal pulses.      Heart sounds: Normal heart sounds, S1 normal and S2 normal.   Pulmonary:      Effort: Pulmonary effort is normal.      Breath sounds: Normal breath sounds and air entry. No stridor, decreased air movement or transmitted upper airway sounds. No decreased breath sounds or wheezing.   Abdominal:      General: Abdomen is flat. Bowel sounds are normal.      Palpations: Abdomen is soft. Abdomen is not rigid.      Tenderness: There is no guarding.   Musculoskeletal:         General: Normal range of motion.      Cervical back: Normal range of motion and neck supple. No muscular tenderness.   Lymphadenopathy:      Cervical: No cervical adenopathy.   Skin:     General: Skin is warm and dry.       Capillary Refill: Capillary refill takes less than 2 seconds.   Neurological:      General: No focal deficit present.      Mental Status: She is alert and oriented to person, place, and time.   Psychiatric:         Mood and Affect: Mood normal.         Behavior: Behavior normal. Behavior is cooperative.         Thought Content: Thought content normal.         Judgment: Judgment normal.           Assessment:       1. Allergic rhinitis due to pollen, unspecified seasonality    2. SOB (shortness of breath)          Plan:       Jane KULKARNI was seen today for post nasal drip.    Diagnoses and all orders for this visit:    Allergic rhinitis due to pollen, unspecified seasonality  -     azelastine (ASTELIN) 137 mcg (0.1 %) nasal spray; 1 spray (137 mcg total) by Nasal route 2 (two) times daily.    SOB (shortness of breath)  -     albuterol (PROVENTIL/VENTOLIN HFA) 90 mcg/actuation inhaler; Inhale 2 puffs into the lungs every 6 (six) hours as needed for Wheezing. Rescue She states her mom gave her this inhaler

## 2023-01-20 DIAGNOSIS — N64.89 GALACTOCELE: Primary | ICD-10-CM

## 2023-01-25 ENCOUNTER — TELEPHONE (OUTPATIENT)
Dept: FAMILY MEDICINE | Facility: CLINIC | Age: 33
End: 2023-01-25

## 2023-01-25 NOTE — TELEPHONE ENCOUNTER
Spoke with pt., told her Barbara will be out of the office until Feb. 6th, but she can see someone else in the office.  Pt states she will see who ever she cab get in with first.

## 2023-01-25 NOTE — TELEPHONE ENCOUNTER
----- Message from Tanya Ovalles sent at 1/25/2023  1:48 PM CST -----  Contact: Self  Type:  Sooner Apoointment Request    Caller is requesting a sooner appointment.  Caller declined first available appointment listed below.  Caller will not accept being placed on the waitlist and is requesting a message be sent to doctor.  Name of Caller:Pt  When is the first available appointment?N/A  Symptoms:Trouble swallowing  Would the patient rather a call back or a response via [facility]? Call  Best Call Back Number:[phone]    Additional Information: Pt states that she's having trouble swallowing and it is causing her anxiety.  Would like to be seen as soon as possible.      Additionally, pt would like a referral to an ENT

## 2023-01-30 ENCOUNTER — OFFICE VISIT (OUTPATIENT)
Dept: FAMILY MEDICINE | Facility: CLINIC | Age: 33
End: 2023-01-30
Payer: MEDICAID

## 2023-01-30 VITALS
HEART RATE: 82 BPM | TEMPERATURE: 98 F | BODY MASS INDEX: 27.88 KG/M2 | SYSTOLIC BLOOD PRESSURE: 137 MMHG | WEIGHT: 177.63 LBS | RESPIRATION RATE: 18 BRPM | DIASTOLIC BLOOD PRESSURE: 87 MMHG | HEIGHT: 67 IN

## 2023-01-30 DIAGNOSIS — R13.11 ORAL PHASE DYSPHAGIA: Primary | ICD-10-CM

## 2023-01-30 DIAGNOSIS — F41.1 GAD (GENERALIZED ANXIETY DISORDER): ICD-10-CM

## 2023-01-30 PROCEDURE — 3075F PR MOST RECENT SYSTOLIC BLOOD PRESS GE 130-139MM HG: ICD-10-PCS | Mod: CPTII,,, | Performed by: NURSE PRACTITIONER

## 2023-01-30 PROCEDURE — 3079F PR MOST RECENT DIASTOLIC BLOOD PRESSURE 80-89 MM HG: ICD-10-PCS | Mod: CPTII,,, | Performed by: NURSE PRACTITIONER

## 2023-01-30 PROCEDURE — 3008F PR BODY MASS INDEX (BMI) DOCUMENTED: ICD-10-PCS | Mod: CPTII,,, | Performed by: NURSE PRACTITIONER

## 2023-01-30 PROCEDURE — 3008F BODY MASS INDEX DOCD: CPT | Mod: CPTII,,, | Performed by: NURSE PRACTITIONER

## 2023-01-30 PROCEDURE — 99213 PR OFFICE/OUTPT VISIT, EST, LEVL III, 20-29 MIN: ICD-10-PCS | Mod: S$PBB,,, | Performed by: NURSE PRACTITIONER

## 2023-01-30 PROCEDURE — 3079F DIAST BP 80-89 MM HG: CPT | Mod: CPTII,,, | Performed by: NURSE PRACTITIONER

## 2023-01-30 PROCEDURE — 1159F PR MEDICATION LIST DOCUMENTED IN MEDICAL RECORD: ICD-10-PCS | Mod: CPTII,,, | Performed by: NURSE PRACTITIONER

## 2023-01-30 PROCEDURE — 3075F SYST BP GE 130 - 139MM HG: CPT | Mod: CPTII,,, | Performed by: NURSE PRACTITIONER

## 2023-01-30 PROCEDURE — 1159F MED LIST DOCD IN RCRD: CPT | Mod: CPTII,,, | Performed by: NURSE PRACTITIONER

## 2023-01-30 PROCEDURE — 99213 OFFICE O/P EST LOW 20 MIN: CPT | Mod: S$PBB,,, | Performed by: NURSE PRACTITIONER

## 2023-01-30 PROCEDURE — 99214 OFFICE O/P EST MOD 30 MIN: CPT | Performed by: NURSE PRACTITIONER

## 2023-01-30 RX ORDER — SERTRALINE HYDROCHLORIDE 50 MG/1
50 TABLET, FILM COATED ORAL DAILY
Qty: 30 TABLET | Refills: 1 | Status: SHIPPED | OUTPATIENT
Start: 2023-01-30 | End: 2023-02-16

## 2023-01-30 RX ORDER — HYDROXYZINE HYDROCHLORIDE 25 MG/1
25 TABLET, FILM COATED ORAL 3 TIMES DAILY PRN
Qty: 60 TABLET | Refills: 0 | Status: SHIPPED | OUTPATIENT
Start: 2023-01-30 | End: 2023-08-01

## 2023-01-30 NOTE — PROGRESS NOTES
Patient ID: Jane Garcia is a 32 y.o. female.    Chief Complaint: Anxiety (31 yo female c/o having frequent anxiety attacks and having trouble swallowing food. Pt states hx of anxiety but more frequent times 3 mos. KM)    Ms. Garcia presents today for evaluation of swallowing difficulty that has been happening occasionally since November. She states when she was in [place] she had an instance when she choked on an omlette with cheese and it caused her to have anxiety instances when she had extreme anxiety related to this. She denies any issues prior but she has not been eating regular diet out of fear and anxiety. She also states she is back in school and has 2 small children and all of these issues are causing her to have anxiety. She was previously prescribed xanax but she did not utilize the prescription at the time due to her not liking to take medication. She is requesting referral to have this evaluated as well as possible medication to help with anxiety. She denies any other  issues or complaints at this time. She is a patient of Barbara HENRY.     Anxiety  Presents for initial visit. Onset was 1 to 6 months ago. The problem has been gradually worsening. Symptoms include excessive worry, nervous/anxious behavior, palpitations and restlessness. Patient reports no chest pain, confusion, dizziness, nausea or shortness of breath. Symptoms occur most days. The severity of symptoms is causing significant distress. The symptoms are aggravated by family issues. The patient sleeps 6 hours per night. The quality of sleep is fair. Nighttime awakenings: occasional.     Risk factors include a major life event and prior traumatic experience. Her past medical history is significant for anxiety/panic attacks. Past treatments include benzodiazephines and counseling (CBT). The treatment provided moderate relief. Compliance with prior treatments has been good. Prior compliance problems include medication issues.          Past Medical History:   Diagnosis Date    Allergy     Anxiety      Past Surgical History:   Procedure Laterality Date    HERNIA REPAIR      LAPAROSCOPIC CHOLECYSTECTOMY N/A 5/23/2022    Procedure: CHOLECYSTECTOMY, LAPAROSCOPIC;  Surgeon: Garth Hurley III, MD;  Location: [facility];  Service: General;  Laterality: N/A;         Tobacco History:  reports that she has never smoked. She has never used smokeless tobacco.      Review of patient's allergies indicates:  No Known Allergies    Current Outpatient Medications:     albuterol (PROVENTIL/VENTOLIN HFA) 90 mcg/actuation inhaler, Inhale 2 puffs into the lungs every 6 (six) hours as needed for Wheezing. Rescue She states her mom gave her this inhaler, Disp: 18 g, Rfl: 2    azelastine (ASTELIN) 137 mcg (0.1 %) nasal spray, 1 spray (137 mcg total) by Nasal route 2 (two) times daily., Disp: 30 mL, Rfl: 2    fluticasone propionate (FLONASE) 50 mcg/actuation nasal spray, 2 sprays (100 mcg total) by Each Nostril route once daily., Disp: 15.8 mL, Rfl: 0    hydrOXYzine HCL (ATARAX) 25 MG tablet, Take 1 tablet (25 mg total) by mouth 3 (three) times daily as needed for Anxiety., Disp: 60 tablet, Rfl: 0    sertraline (ZOLOFT) 50 MG tablet, Take 1 tablet (50 mg total) by mouth once daily., Disp: 30 tablet, Rfl: 1    Review of Systems   Constitutional:  Negative for activity change, appetite change, fatigue, fever and unexpected weight change.   HENT:  Positive for trouble swallowing. Negative for congestion, hearing loss, mouth sores, nosebleeds, postnasal drip, rhinorrhea, sinus pressure, sinus pain, sneezing and sore throat.    Eyes:  Negative for pain, discharge, redness, itching and visual disturbance.   Respiratory:  Negative for chest tightness, shortness of breath and wheezing.    Cardiovascular:  Positive for palpitations. Negative for chest pain.   Gastrointestinal:  Negative for abdominal pain, blood in stool, constipation, diarrhea, nausea and vomiting.  "  Endocrine: Negative for cold intolerance, heat intolerance, polydipsia, polyphagia and polyuria.   Genitourinary:  Negative for difficulty urinating, dysuria, flank pain, frequency, genital sores, hematuria, menstrual problem, urgency, vaginal bleeding and vaginal discharge.   Musculoskeletal:  Negative for arthralgias, joint swelling, myalgias and neck pain.   Skin:  Negative for rash and wound.   Allergic/Immunologic: Negative for environmental allergies and food allergies.   Neurological:  Negative for dizziness, weakness, light-headedness and headaches.   Hematological:  Negative for adenopathy. Does not bruise/bleed easily.   Psychiatric/Behavioral:  Negative for agitation, confusion, dysphoric mood, hallucinations, self-injury and sleep disturbance. The patient is nervous/anxious.         Objective:      Vitals:    01/30/23 0901   BP: 137/87   Pulse: 82   Resp: 18   Temp: 98.4 °F (36.9 °C)   TempSrc: Oral   Weight: 80.6 kg (177 lb 9.6 oz)   Height: 5' 7" (1.702 m)     Physical Exam  Vitals reviewed.   Constitutional:       General: She is not in acute distress.     Appearance: Normal appearance. She is normal weight. She is not ill-appearing, toxic-appearing or diaphoretic.   HENT:      Head: Normocephalic and atraumatic.      Right Ear: Tympanic membrane and external ear normal. There is no impacted cerumen.      Left Ear: Tympanic membrane and external ear normal. There is no impacted cerumen.      Nose: Nose normal. No congestion or rhinorrhea.      Mouth/Throat:      Mouth: Mucous membranes are moist.      Pharynx: Oropharynx is clear. No oropharyngeal exudate or posterior oropharyngeal erythema.   Eyes:      General: No scleral icterus.        Right eye: No discharge.         Left eye: No discharge.      Extraocular Movements: Extraocular movements intact.      Conjunctiva/sclera: Conjunctivae normal.      Pupils: Pupils are equal, round, and reactive to light.   Neck:      Vascular: No carotid bruit. "   Cardiovascular:      Rate and Rhythm: Normal rate and regular rhythm.      Pulses: Normal pulses.      Heart sounds: Normal heart sounds. No murmur heard.    No friction rub. No gallop.   Pulmonary:      Effort: Pulmonary effort is normal. No respiratory distress.      Breath sounds: Normal breath sounds. No stridor. No rales.   Chest:      Chest wall: No tenderness.   Abdominal:      General: Abdomen is flat. Bowel sounds are normal.      Palpations: Abdomen is soft. There is no mass.      Tenderness: There is no abdominal tenderness. There is no guarding.   Musculoskeletal:         General: No swelling or signs of injury. Normal range of motion.      Cervical back: Normal range of motion and neck supple. No rigidity or tenderness.      Right lower leg: No edema.      Left lower leg: No edema.   Skin:     General: Skin is warm and dry.      Capillary Refill: Capillary refill takes less than 2 seconds.      Findings: No bruising, lesion or rash.   Neurological:      General: No focal deficit present.      Mental Status: She is alert and oriented to person, place, and time. Mental status is at baseline.      Motor: No weakness.      Coordination: Coordination normal.   Psychiatric:         Mood and Affect: Mood normal.         Behavior: Behavior normal.         Thought Content: Thought content normal.         Judgment: Judgment normal.         Assessment:       1. Oral phase dysphagia    2. ROBERTA (generalized anxiety disorder)           Plan:       Oral phase dysphagia  -     Ambulatory referral/consult to ENT; Future; Expected date: 02/06/2023    ROBERTA (generalized anxiety disorder)  -     sertraline (ZOLOFT) 50 MG tablet; Take 1 tablet (50 mg total) by mouth once daily.  Dispense: 30 tablet; Refill: 1  -     hydrOXYzine HCL (ATARAX) 25 MG tablet; Take 1 tablet (25 mg total) by mouth 3 (three) times daily as needed for Anxiety.  Dispense: 60 tablet; Refill: 0      Follow up in about 4 weeks (around 2/27/2023), or if  symptoms worsen or fail to improve, for Anxiety follow up with Barbara Burks .        1/30/2023 Danuta Eddy, NP

## 2023-02-16 ENCOUNTER — OFFICE VISIT (OUTPATIENT)
Dept: FAMILY MEDICINE | Facility: CLINIC | Age: 33
End: 2023-02-16
Payer: MEDICAID

## 2023-02-16 VITALS
BODY MASS INDEX: 28.41 KG/M2 | OXYGEN SATURATION: 99 % | SYSTOLIC BLOOD PRESSURE: 120 MMHG | HEART RATE: 52 BPM | DIASTOLIC BLOOD PRESSURE: 72 MMHG | HEIGHT: 67 IN | TEMPERATURE: 99 F | WEIGHT: 181 LBS

## 2023-02-16 DIAGNOSIS — E66.3 OVERWEIGHT (BMI 25.0-29.9): ICD-10-CM

## 2023-02-16 DIAGNOSIS — R13.11 ORAL PHASE DYSPHAGIA: ICD-10-CM

## 2023-02-16 DIAGNOSIS — F41.1 GAD (GENERALIZED ANXIETY DISORDER): Primary | ICD-10-CM

## 2023-02-16 PROCEDURE — 3074F SYST BP LT 130 MM HG: CPT | Mod: CPTII,,, | Performed by: NURSE PRACTITIONER

## 2023-02-16 PROCEDURE — 1160F PR REVIEW ALL MEDS BY PRESCRIBER/CLIN PHARMACIST DOCUMENTED: ICD-10-PCS | Mod: CPTII,,, | Performed by: NURSE PRACTITIONER

## 2023-02-16 PROCEDURE — 1159F MED LIST DOCD IN RCRD: CPT | Mod: CPTII,,, | Performed by: NURSE PRACTITIONER

## 2023-02-16 PROCEDURE — 3078F PR MOST RECENT DIASTOLIC BLOOD PRESSURE < 80 MM HG: ICD-10-PCS | Mod: CPTII,,, | Performed by: NURSE PRACTITIONER

## 2023-02-16 PROCEDURE — 99214 OFFICE O/P EST MOD 30 MIN: CPT | Performed by: NURSE PRACTITIONER

## 2023-02-16 PROCEDURE — 1160F RVW MEDS BY RX/DR IN RCRD: CPT | Mod: CPTII,,, | Performed by: NURSE PRACTITIONER

## 2023-02-16 PROCEDURE — 3008F BODY MASS INDEX DOCD: CPT | Mod: CPTII,,, | Performed by: NURSE PRACTITIONER

## 2023-02-16 PROCEDURE — 3008F PR BODY MASS INDEX (BMI) DOCUMENTED: ICD-10-PCS | Mod: CPTII,,, | Performed by: NURSE PRACTITIONER

## 2023-02-16 PROCEDURE — 1159F PR MEDICATION LIST DOCUMENTED IN MEDICAL RECORD: ICD-10-PCS | Mod: CPTII,,, | Performed by: NURSE PRACTITIONER

## 2023-02-16 PROCEDURE — 3074F PR MOST RECENT SYSTOLIC BLOOD PRESSURE < 130 MM HG: ICD-10-PCS | Mod: CPTII,,, | Performed by: NURSE PRACTITIONER

## 2023-02-16 PROCEDURE — 99213 OFFICE O/P EST LOW 20 MIN: CPT | Mod: S$PBB,,, | Performed by: NURSE PRACTITIONER

## 2023-02-16 PROCEDURE — 3078F DIAST BP <80 MM HG: CPT | Mod: CPTII,,, | Performed by: NURSE PRACTITIONER

## 2023-02-16 PROCEDURE — 99213 PR OFFICE/OUTPT VISIT, EST, LEVL III, 20-29 MIN: ICD-10-PCS | Mod: S$PBB,,, | Performed by: NURSE PRACTITIONER

## 2023-02-16 RX ORDER — FLUOXETINE 10 MG/1
10 CAPSULE ORAL DAILY
Qty: 30 CAPSULE | Refills: 1 | Status: SHIPPED | OUTPATIENT
Start: 2023-02-16 | End: 2023-06-13

## 2023-02-16 NOTE — PROGRESS NOTES
Subjective:       Patient ID: Jane Garcia is a 32 y.o. female.    Chief Complaint: Anxiety    Patient presents today following up for anxiety and fear of choking causing feeling of impaired swallowing. Patient has previously had a choking episode and find that when her anxiety peaks, she has trouble eating and swallowing food. This is a recurrent issue and only occurs with anxiety episodes. She was seen by CARL Montes about a month ago and was started on zoloft 50 mg daily and hydroxyzine 25 mg tid prn. She tried taking the zoloft but experienced headaches with the medication. Patient could not take it any longer and stopped it 5 days ago. She does feel she needs some medication and would like to try another. She does take the hydroxyzine as needed and finds this helpful. This medication does make her sleepy and she only takes in the evening.   Patient is overweight with a BMI of 28.35    Anxiety  Presents for follow-up visit. Symptoms include excessive worry, irritability, nervous/anxious behavior and palpitations. Patient reports no chest pain, confusion, nausea, restlessness, shortness of breath or suicidal ideas. Symptoms occur most days. The severity of symptoms is causing significant distress and interfering with daily activities. The quality of sleep is good. Nighttime awakenings: one to two.     Compliance with medications is %.   Review of Systems   Constitutional:  Positive for irritability. Negative for activity change, appetite change, fever and unexpected weight change.   HENT:  Positive for trouble swallowing. Negative for congestion, ear discharge, ear pain, hearing loss, rhinorrhea, sore throat and voice change.    Eyes:  Negative for photophobia, pain, discharge and visual disturbance.   Respiratory:  Positive for chest tightness. Negative for cough, shortness of breath and wheezing.    Cardiovascular:  Positive for palpitations. Negative for chest pain.   Gastrointestinal:   Negative for abdominal pain, blood in stool, constipation, diarrhea, nausea and vomiting.   Endocrine: Negative for cold intolerance, heat intolerance, polydipsia and polyuria.   Genitourinary:  Negative for difficulty urinating, dysuria, hematuria and menstrual problem.   Musculoskeletal:  Negative for arthralgias, gait problem, joint swelling and neck pain.   Skin:  Negative for rash.   Allergic/Immunologic: Negative for immunocompromised state.   Neurological:  Positive for headaches. Negative for speech difficulty and weakness.   Psychiatric/Behavioral:  Negative for confusion, dysphoric mood, self-injury and suicidal ideas. The patient is nervous/anxious.      Past Medical History:   Diagnosis Date    Allergy     Anxiety       Past Surgical History:   Procedure Laterality Date    HERNIA REPAIR      LAPAROSCOPIC CHOLECYSTECTOMY N/A 5/23/2022    Procedure: CHOLECYSTECTOMY, LAPAROSCOPIC;  Surgeon: Garth Hurley III, MD;  Location: [facility];  Service: General;  Laterality: N/A;       Family History   Problem Relation Age of Onset    Hypertension Mother     Cancer Maternal Grandmother     Hypertension Maternal Grandmother     Hypertension Father     Hypertension Maternal Aunt     Hypertension Maternal Uncle     No Known Problems Paternal Uncle     Heart disease Maternal Grandfather        Social History     Socioeconomic History    Marital status:    Tobacco Use    Smoking status: Never    Smokeless tobacco: Never   Substance and Sexual Activity    Alcohol use: Not Currently    Drug use: No    Sexual activity: Yes     Partners: Male   Social History Narrative    ** Merged History Encounter **            Current Outpatient Medications   Medication Sig Dispense Refill    albuterol (PROVENTIL/VENTOLIN HFA) 90 mcg/actuation inhaler Inhale 2 puffs into the lungs every 6 (six) hours as needed for Wheezing. Rescue She states her mom gave her this inhaler 18 g 2    azelastine (ASTELIN) 137 mcg (0.1 %) nasal spray  "1 spray (137 mcg total) by Nasal route 2 (two) times daily. 30 mL 2    fluticasone propionate (FLONASE) 50 mcg/actuation nasal spray 2 sprays (100 mcg total) by Each Nostril route once daily. 15.8 mL 0    hydrOXYzine HCL (ATARAX) 25 MG tablet Take 1 tablet (25 mg total) by mouth 3 (three) times daily as needed for Anxiety. 60 tablet 0    FLUoxetine 10 MG capsule Take 1 capsule (10 mg total) by mouth once daily. 30 capsule 1     No current facility-administered medications for this visit.       Review of patient's allergies indicates:  No Known Allergies  Objective:      Blood pressure 120/72, pulse (!) 52, temperature 98.6 °F (37 °C), height 5' 7" (1.702 m), weight 82.1 kg (181 lb), last menstrual period 02/03/2023, SpO2 99 %. Body mass index is 28.35 kg/m².   Physical Exam  Vitals and nursing note reviewed.   Constitutional:       General: She is not in acute distress.     Appearance: Normal appearance. She is well-developed. She is not ill-appearing.   HENT:      Head: Normocephalic and atraumatic.      Right Ear: External ear normal.      Left Ear: External ear normal.      Nose: Nose normal.      Mouth/Throat:      Mouth: Mucous membranes are moist.   Eyes:      General: Lids are normal. Lids are everted, no foreign bodies appreciated.      Conjunctiva/sclera: Conjunctivae normal.      Pupils: Pupils are equal, round, and reactive to light.      Right eye: Pupil is round and reactive.      Left eye: Pupil is round and reactive.   Neck:      Trachea: Trachea normal.   Cardiovascular:      Rate and Rhythm: Normal rate and regular rhythm.      Pulses: Normal pulses.      Heart sounds: Normal heart sounds, S1 normal and S2 normal.   Pulmonary:      Effort: Pulmonary effort is normal. No respiratory distress.      Breath sounds: Normal breath sounds.   Abdominal:      General: Abdomen is flat. Bowel sounds are normal.      Palpations: Abdomen is soft. Abdomen is not rigid.      Tenderness: There is no guarding. "   Musculoskeletal:         General: Normal range of motion.      Cervical back: Normal range of motion and neck supple. No muscular tenderness.   Lymphadenopathy:      Cervical: No cervical adenopathy.   Skin:     General: Skin is warm and dry.      Capillary Refill: Capillary refill takes less than 2 seconds.   Neurological:      General: No focal deficit present.      Mental Status: She is alert and oriented to person, place, and time.   Psychiatric:         Mood and Affect: Affect normal. Mood is anxious.         Speech: Speech normal.         Behavior: Behavior normal. Behavior is cooperative.         Thought Content: Thought content normal.         Judgment: Judgment normal.           Assessment:       1. ROBERTA (generalized anxiety disorder)    2. Oral phase dysphagia    3. Overweight (BMI 25.0-29.9)          Plan:       Jane KULKARNI was seen today for anxiety.    Diagnoses and all orders for this visit:    ROBERTA (generalized anxiety disorder)  -     FLUoxetine 10 MG capsule; Take 1 capsule (10 mg total) by mouth once daily.    Continue hydroxyzine prn    Oral phase dysphagia  Only with anxiety.  If anxiety is controlled and symptoms remain, refer to GI    Overweight (BMI 25.0-29.9)  The patient is asked to make an attempt to improve diet and exercise patterns to aid in medical management of this problem.      Follow up in 6 weeks for mood and anxiety

## 2023-02-22 ENCOUNTER — HOSPITAL ENCOUNTER (OUTPATIENT)
Dept: RADIOLOGY | Facility: HOSPITAL | Age: 33
Discharge: HOME | End: 2023-02-22
Attending: STUDENT IN AN ORGANIZED HEALTH CARE EDUCATION/TRAINING PROGRAM
Payer: MEDICAID

## 2023-02-22 DIAGNOSIS — N64.89 GALACTOCELE: ICD-10-CM

## 2023-02-22 PROCEDURE — 76642 ULTRASOUND BREAST LIMITED: CPT | Mod: TC,PO,LT

## 2023-02-22 PROCEDURE — 77066 DX MAMMO INCL CAD BI: CPT | Mod: TC,PO

## 2023-04-04 ENCOUNTER — PATIENT MESSAGE (OUTPATIENT)
Dept: RESEARCH | Facility: HOSPITAL | Age: 33
End: 2023-04-04
Payer: MEDICAID

## 2023-05-30 ENCOUNTER — HOSPITAL ENCOUNTER (EMERGENCY)
Facility: HOSPITAL | Age: 33
Discharge: HOME | End: 2023-05-30
Attending: EMERGENCY MEDICINE
Payer: MEDICAID

## 2023-05-30 VITALS
HEIGHT: 67 IN | HEART RATE: 88 BPM | BODY MASS INDEX: 27.62 KG/M2 | TEMPERATURE: 98 F | RESPIRATION RATE: 18 BRPM | OXYGEN SATURATION: 98 % | WEIGHT: 176 LBS | DIASTOLIC BLOOD PRESSURE: 91 MMHG | SYSTOLIC BLOOD PRESSURE: 141 MMHG

## 2023-05-30 DIAGNOSIS — M54.50 ACUTE RIGHT-SIDED LOW BACK PAIN WITHOUT SCIATICA: Primary | ICD-10-CM

## 2023-05-30 LAB
B-HCG UR QL: NEGATIVE
CTP QC/QA: YES

## 2023-05-30 PROCEDURE — 96372 THER/PROPH/DIAG INJ SC/IM: CPT | Performed by: EMERGENCY MEDICINE

## 2023-05-30 PROCEDURE — 63600175 PHARM REV CODE 636 W HCPCS: Performed by: EMERGENCY MEDICINE

## 2023-05-30 PROCEDURE — 99284 EMERGENCY DEPT VISIT MOD MDM: CPT

## 2023-05-30 PROCEDURE — 25000003 PHARM REV CODE 250: Performed by: EMERGENCY MEDICINE

## 2023-05-30 PROCEDURE — 81025 URINE PREGNANCY TEST: CPT | Performed by: NURSE PRACTITIONER

## 2023-05-30 RX ORDER — NAPROXEN 250 MG/1
500 TABLET ORAL
Status: COMPLETED | OUTPATIENT
Start: 2023-05-30 | End: 2023-05-30

## 2023-05-30 RX ORDER — LIDOCAINE 50 MG/G
1 PATCH TOPICAL ONCE
Status: DISCONTINUED | OUTPATIENT
Start: 2023-05-30 | End: 2023-05-30 | Stop reason: HOSPADM

## 2023-05-30 RX ORDER — METHOCARBAMOL 500 MG/1
1000 TABLET, FILM COATED ORAL
Status: DISCONTINUED | OUTPATIENT
Start: 2023-05-30 | End: 2023-05-30 | Stop reason: HOSPADM

## 2023-05-30 RX ORDER — METHOCARBAMOL 500 MG/1
1000 TABLET, FILM COATED ORAL 3 TIMES DAILY
Qty: 30 TABLET | Refills: 0 | Status: SHIPPED | OUTPATIENT
Start: 2023-05-30 | End: 2023-06-04

## 2023-05-30 RX ORDER — METHOCARBAMOL 500 MG/1
1000 TABLET, FILM COATED ORAL
Status: COMPLETED | OUTPATIENT
Start: 2023-05-30 | End: 2023-05-30

## 2023-05-30 RX ORDER — DEXAMETHASONE SODIUM PHOSPHATE 4 MG/ML
10 INJECTION, SOLUTION INTRA-ARTICULAR; INTRALESIONAL; INTRAMUSCULAR; INTRAVENOUS; SOFT TISSUE
Status: COMPLETED | OUTPATIENT
Start: 2023-05-30 | End: 2023-05-30

## 2023-05-30 RX ORDER — NAPROXEN 500 MG/1
500 TABLET ORAL 2 TIMES DAILY WITH MEALS
Qty: 15 TABLET | Refills: 0 | Status: SHIPPED | OUTPATIENT
Start: 2023-05-30 | End: 2023-08-01

## 2023-05-30 RX ORDER — HYDROCODONE BITARTRATE AND ACETAMINOPHEN 5; 325 MG/1; MG/1
1 TABLET ORAL EVERY 6 HOURS PRN
Qty: 12 TABLET | Refills: 0 | Status: SHIPPED | OUTPATIENT
Start: 2023-05-30 | End: 2023-08-01

## 2023-05-30 RX ORDER — LIDOCAINE 50 MG/G
1 PATCH TOPICAL DAILY
Qty: 15 PATCH | Refills: 0 | Status: SHIPPED | OUTPATIENT
Start: 2023-05-30 | End: 2023-08-24

## 2023-05-30 RX ADMIN — NAPROXEN 500 MG: 250 TABLET ORAL at 04:05

## 2023-05-30 RX ADMIN — LIDOCAINE 1 PATCH: 50 PATCH TOPICAL at 04:05

## 2023-05-30 RX ADMIN — DEXAMETHASONE SODIUM PHOSPHATE 10 MG: 4 INJECTION, SOLUTION INTRA-ARTICULAR; INTRALESIONAL; INTRAMUSCULAR; INTRAVENOUS; SOFT TISSUE at 03:05

## 2023-05-30 RX ADMIN — METHOCARBAMOL 1000 MG: 500 TABLET ORAL at 04:05

## 2023-05-30 NOTE — DISCHARGE INSTRUCTIONS
RETURN TO EMERGENCY DEPARTMENT WITHOUT FAIL, IF YOUR SYMPTOMS WORSEN, IF YOU GET NEW OR DIFFERENT SYMPTOMS, IF YOU ARE UNABLE TO FOLLOW UP AS DIRECTED, OR IF YOU HAVE ANY CONCERNS OR WORRIES.    Follow-up with primary care provider and Dr. Jewell on an outpatient basis.  Take medication as directed.

## 2023-05-30 NOTE — FIRST PROVIDER EVALUATION
"Medical screening examination initiated.  I have conducted a focused provider triage encounter, findings are as follows:    Brief history of present illness:  Presents with low back pain.  Onset today.  Patient reports she was cleaning her Children's room when her back when out    Vitals:    05/30/23 1259 05/30/23 1509   BP: (!) 159/94 (!) 144/91   BP Location: Left arm Right arm   Patient Position: Sitting Sitting   Pulse: 79 74   Resp: 18 18   Temp: 98.1 °F (36.7 °C)    TempSrc: Oral    SpO2: 100% 99%   Weight: 79.8 kg (176 lb)    Height: 5' 7" (1.702 m)        Pertinent physical exam:  Patient is ambulatory per self she is full range of motion at the waist.  She is no bony tenderness to the L-spine.    Brief workup plan:  L-spine x-ray and pain control    Preliminary workup initiated; this workup will be continued and followed by the physician or advanced practice provider that is assigned to the patient when roomed.  "

## 2023-05-30 NOTE — ED PROVIDER NOTES
"Encounter Date: 5/30/2023       History     Chief Complaint   Patient presents with    Back Pain     Yesterday cleaning kids room and "back went out", lower back pain     32-year-old female presents emergency department with low back pain.  She says it is right-sided in her low back.  She says that she bent down yesterday to pick something up and when she stood up it started hurting.  It is in her right lower back.  It does not radiate.  It is definitely worse with twisting and turning.  Somewhat relieved with flexion.  She is able to find a position of comfort.  There is no urinary symptoms such as frequency urgency or burning.  No hematuria.  No fall trauma or injury noted.  She denies any bowel or bladder incontinence.  She does not have any fever.  She is never had any issues with the back in the past.  She does have 2 small young children and does picked him up frequently.    Review of patient's allergies indicates:  No Known Allergies  Past Medical History:   Diagnosis Date    Allergy     Anxiety      Past Surgical History:   Procedure Laterality Date    HERNIA REPAIR      LAPAROSCOPIC CHOLECYSTECTOMY N/A 5/23/2022    Procedure: CHOLECYSTECTOMY, LAPAROSCOPIC;  Surgeon: Garth Hurley III, MD;  Location: [facility];  Service: General;  Laterality: N/A;     Family History   Problem Relation Age of Onset    Hypertension Mother     Hypertension Father     Hypertension Maternal Aunt     Hypertension Maternal Uncle     No Known Problems Paternal Uncle     Breast cancer Maternal Grandmother     Cancer Maternal Grandmother     Hypertension Maternal Grandmother     Heart disease Maternal Grandfather      Social History     Tobacco Use    Smoking status: Never    Smokeless tobacco: Never   Substance Use Topics    Alcohol use: Not Currently    Drug use: No     Review of Systems   Constitutional:  Negative for chills and fever.   HENT:  Negative for sore throat.    Respiratory:  Negative for shortness of breath.  "   Cardiovascular:  Negative for chest pain.   Gastrointestinal:  Negative for abdominal pain, nausea and vomiting.   Genitourinary:  Negative for dysuria.   Musculoskeletal:  Positive for back pain. Negative for neck pain.   Skin:  Negative for rash.   Neurological:  Negative for weakness and headaches.   Hematological:  Does not bruise/bleed easily.   All other systems reviewed and are negative.    Physical Exam     Initial Vitals [05/30/23 1259]   BP Pulse Resp Temp SpO2   (!) 159/94 79 18 98.1 °F (36.7 °C) 100 %      MAP       --         Physical Exam    Nursing note and vitals reviewed.  Constitutional: She appears well-developed and well-nourished. No distress.   HENT:   Head: Normocephalic and atraumatic.   Mouth/Throat: No oropharyngeal exudate.   Eyes: Conjunctivae and EOM are normal. Pupils are equal, round, and reactive to light.   Neck: Neck supple. No tracheal deviation present.   Normal range of motion.  Cardiovascular:  Normal rate, regular rhythm, normal heart sounds and intact distal pulses.           No murmur heard.  Pulmonary/Chest: Breath sounds normal. No stridor. No respiratory distress. She has no wheezes. She has no rhonchi. She has no rales.   Abdominal: Abdomen is soft. She exhibits no distension. There is no abdominal tenderness.   No CVA tenderness to palpation bilaterally.  No rash to suggest herpes zoster. There is no rebound.   Musculoskeletal:         General: No tenderness or edema. Normal range of motion.      Cervical back: Normal range of motion and neck supple.      Comments: Low back:  There is no midline tenderness to palpation is mostly right-sided muscular tenderness on the upper lumbar spine right lower lumbar spine.  No step-off.    Thoracic spine:  No midline tenderness to palpation     Neurological: She is alert and oriented to person, place, and time. She has normal strength. No cranial nerve deficit or sensory deficit.   There is a negative straight leg test  bilaterally.  Patient is able to walk although with pain.   Skin: Skin is warm and dry. Capillary refill takes less than 2 seconds. No rash noted. No erythema. No pallor.   Psychiatric: She has a normal mood and affect. Thought content normal.       ED Course   Procedures  Labs Reviewed   POCT URINE PREGNANCY          Imaging Results              X-Ray Lumbar Spine 5 View (Final result)  Result time 05/30/23 14:14:42   Procedure changed from X-Ray Lumbar Spine Ap And Lateral     Final result by Toribio Andujar MD (05/30/23 14:14:42)                   Narrative:    XR LUMBAR SPINE 4 OR MORE VIEWS    CLINICAL HISTORY:  32 years Female pain    COMPARISON: None    FINDINGS: Lumbar spine alignment is within normal limits. Lumbar vertebral body heights are maintained. Disc spaces are preserved. Posterior elements are intact. Paraspinal soft tissues are unremarkable. Right upper quadrant cholecystectomy clips.    IMPRESSION:    Negative lumbar spine radiography.    Electronically signed by:  Toribio Andujar MD  5/30/2023 2:14 PM CDT Workstation: [id number]                                     Medications   dexAMETHasone injection 10 mg (10 mg Intramuscular Given 5/30/23 1558)   naproxen tablet 500 mg (500 mg Oral Given 5/30/23 1611)   methocarbamoL tablet 1,000 mg (1,000 mg Oral Given 5/30/23 1611)     Medical Decision Making:   Differential Diagnosis:   Includes but not limited to low back strain low back contusion, sciatica, radiculopathy, herniated disc, muscle strain and spasm  Clinical Tests:   Lab Tests: Ordered and Reviewed  Radiological Study: Ordered and Reviewed  Medical Tests: Ordered and Reviewed  ED Management:  Emergent evaluation of a 32-year-old female presents emergency department with low back pain.  Overall impression is a low back strain.  Considered sciatica but patient does not have any radicular-type pain.  Maybe she can feel it turned but sometimes.  I did give her Decadron shot which should help.   I recommend supportive care with anti-inflammatories, muscle relaxers and pain medication as needed.  Pain is reproducible with changing positions and movement and palpation.  Does seem musculoskeletal in nature.  I do not believe that she has any acute intra-abdominal process or injure thoracic process.  Doubt any osteomyelitis, diskitis, epidural abscess doubt any cauda equina syndrome.  Doubt any kidney stone/renal infarct.  Doubt any PID etcetera.  Patient will follow-up with PCP/back physician Dr. Jewell on an outpatient basis she will return if symptoms change or worsen.    A dictation software program was used for this note.  Please expect some simple typographical  errors in this note.    I had a detailed discussion with the patient and/or guardian regarding: The historical points, exam findings, and diagnostic results supporting the discharge diagnosis, lab results, pertinent radiology results, and the need for outpatient follow-up, for definitive care with a family practitioner and to return to the emergency department if symptoms worsen or persist or if there are any questions or concerns that arise at home. All questions have been answered in detail. Strict return to Emergency Department precautions have been provided.                           Clinical Impression:   Final diagnoses:  [M54.50] Acute right-sided low back pain without sciatica (Primary)        ED Disposition Condition    Discharge Stable          ED Prescriptions       Medication Sig Dispense Start Date End Date Auth. Provider    HYDROcodone-acetaminophen (NORCO) 5-325 mg per tablet Take 1 tablet by mouth every 6 (six) hours as needed for Pain. 12 tablet 5/30/2023 -- Sivakumar Montes, DO    naproxen (NAPROSYN) 500 MG tablet Take 1 tablet (500 mg total) by mouth 2 (two) times daily with meals. 15 tablet 5/30/2023 -- Sivakumar Montes, DO    LIDOcaine (LIDODERM) 5 % Place 1 patch onto the skin once daily. Remove & Discard patch within 12 hours  or as directed by MD 15 patch 5/30/2023 -- Sivakumar Montes,     methocarbamoL (ROBAXIN) 500 MG Tab Take 2 tablets (1,000 mg total) by mouth 3 (three) times daily. for 5 days 30 tablet 5/30/2023 6/4/2023 Sivakumar Montes DO          Follow-up Information       Follow up With Specialties Details Why Contact Info Additional Information    [facility] In 3 days  [address] [facility] [address]  [phone]       [facility] - Emergency Dept Emergency Medicine  If symptoms worsen [address] [facility] [id number] [phone] 1st floor    Graeme Jewell MD Physical Medicine and Rehabilitation In 3 days  [address] [place] [address] [address]  [facility] [address]  [phone]                Sivakumar Montes DO  05/30/23 2489

## 2023-06-13 ENCOUNTER — OFFICE VISIT (OUTPATIENT)
Dept: FAMILY MEDICINE | Facility: CLINIC | Age: 33
End: 2023-06-13
Payer: MEDICAID

## 2023-06-13 VITALS
WEIGHT: 177 LBS | HEIGHT: 67 IN | DIASTOLIC BLOOD PRESSURE: 82 MMHG | HEART RATE: 78 BPM | OXYGEN SATURATION: 99 % | SYSTOLIC BLOOD PRESSURE: 120 MMHG | BODY MASS INDEX: 27.78 KG/M2 | TEMPERATURE: 98 F

## 2023-06-13 DIAGNOSIS — M62.838 MUSCLE SPASM: Primary | ICD-10-CM

## 2023-06-13 DIAGNOSIS — M54.50 ACUTE RIGHT-SIDED LOW BACK PAIN WITHOUT SCIATICA: ICD-10-CM

## 2023-06-13 DIAGNOSIS — E66.3 OVERWEIGHT (BMI 25.0-29.9): ICD-10-CM

## 2023-06-13 PROCEDURE — 3079F PR MOST RECENT DIASTOLIC BLOOD PRESSURE 80-89 MM HG: ICD-10-PCS | Mod: CPTII,,, | Performed by: NURSE PRACTITIONER

## 2023-06-13 PROCEDURE — 3074F PR MOST RECENT SYSTOLIC BLOOD PRESSURE < 130 MM HG: ICD-10-PCS | Mod: CPTII,,, | Performed by: NURSE PRACTITIONER

## 2023-06-13 PROCEDURE — 99215 OFFICE O/P EST HI 40 MIN: CPT | Performed by: NURSE PRACTITIONER

## 2023-06-13 PROCEDURE — 1159F PR MEDICATION LIST DOCUMENTED IN MEDICAL RECORD: ICD-10-PCS | Mod: CPTII,,, | Performed by: NURSE PRACTITIONER

## 2023-06-13 PROCEDURE — 3079F DIAST BP 80-89 MM HG: CPT | Mod: CPTII,,, | Performed by: NURSE PRACTITIONER

## 2023-06-13 PROCEDURE — 99213 OFFICE O/P EST LOW 20 MIN: CPT | Mod: S$PBB,,, | Performed by: NURSE PRACTITIONER

## 2023-06-13 PROCEDURE — 3008F BODY MASS INDEX DOCD: CPT | Mod: CPTII,,, | Performed by: NURSE PRACTITIONER

## 2023-06-13 PROCEDURE — 3074F SYST BP LT 130 MM HG: CPT | Mod: CPTII,,, | Performed by: NURSE PRACTITIONER

## 2023-06-13 PROCEDURE — 1160F RVW MEDS BY RX/DR IN RCRD: CPT | Mod: CPTII,,, | Performed by: NURSE PRACTITIONER

## 2023-06-13 PROCEDURE — 99213 PR OFFICE/OUTPT VISIT, EST, LEVL III, 20-29 MIN: ICD-10-PCS | Mod: S$PBB,,, | Performed by: NURSE PRACTITIONER

## 2023-06-13 PROCEDURE — 3008F PR BODY MASS INDEX (BMI) DOCUMENTED: ICD-10-PCS | Mod: CPTII,,, | Performed by: NURSE PRACTITIONER

## 2023-06-13 PROCEDURE — 1159F MED LIST DOCD IN RCRD: CPT | Mod: CPTII,,, | Performed by: NURSE PRACTITIONER

## 2023-06-13 PROCEDURE — 1160F PR REVIEW ALL MEDS BY PRESCRIBER/CLIN PHARMACIST DOCUMENTED: ICD-10-PCS | Mod: CPTII,,, | Performed by: NURSE PRACTITIONER

## 2023-06-13 RX ORDER — METHOCARBAMOL 500 MG/1
500 TABLET, FILM COATED ORAL NIGHTLY PRN
Qty: 30 TABLET | Refills: 2 | Status: SHIPPED | OUTPATIENT
Start: 2023-06-13 | End: 2023-06-23

## 2023-06-13 NOTE — PROGRESS NOTES
Subjective:       Patient ID: Jane Garcia is a 32 y.o. female.    Chief Complaint: hosp. f/u  and Back Pain    Patient presents today for ER follow-up.  Labs and records have been reviewed.  She states that she was at home and reached down bending over to pick something up.  Patient immediately felt pain upon arising.  Patient tried to remedy the pain at home which was not helpful.  She went to the emergency room as she states that her pain was a 12/10 and could not move.  Now, 2 weeks later, she states that her pain is a 9/10 in her back.  She has found benefit from muscle relaxers which have relieved her symptoms.  She states that she was told she could have had a herniated disc.  Patient did have an x-ray in the emergency room that was normal.  Patient is overweight with a BMI 27.72    Back Pain  This is a new problem. The current episode started in the past 7 days. The problem occurs constantly. The problem has been waxing and waning since onset. The pain is present in the gluteal and sacro-iliac. The quality of the pain is described as aching, shooting and stabbing. The pain does not radiate. The pain is at a severity of 9/10. The pain is moderate. The pain is The same all the time. The symptoms are aggravated by bending, coughing, position, lying down, sitting, standing and twisting. Stiffness is present All day. Pertinent negatives include no abdominal pain, bladder incontinence, bowel incontinence, chest pain, dysuria, fever, headaches, leg pain, numbness, paresis, paresthesias, pelvic pain, perianal numbness, tingling, weakness or weight loss. Risk factors include lack of exercise and recent trauma. She has tried home exercises and analgesics for the symptoms. The treatment provided mild relief.   Review of Systems   Constitutional:  Negative for activity change, appetite change, fever and weight loss.   HENT:  Negative for congestion, ear discharge, ear pain, sore throat, trouble swallowing and  voice change.    Eyes:  Negative for photophobia, pain, discharge and visual disturbance.   Respiratory:  Negative for cough, chest tightness and shortness of breath.    Cardiovascular:  Negative for chest pain and palpitations.   Gastrointestinal:  Negative for abdominal pain, bowel incontinence, nausea and vomiting.   Endocrine: Negative for cold intolerance and heat intolerance.   Genitourinary:  Negative for bladder incontinence, difficulty urinating, dysuria, hematuria and pelvic pain.   Musculoskeletal:  Positive for back pain and myalgias. Negative for arthralgias and gait problem.   Skin:  Negative for rash.   Allergic/Immunologic: Negative for immunocompromised state.   Neurological:  Negative for tingling, speech difficulty, weakness, numbness, headaches and paresthesias.   Psychiatric/Behavioral:  Negative for confusion, self-injury and suicidal ideas.      Past Medical History:   Diagnosis Date    Allergy     Anxiety       Past Surgical History:   Procedure Laterality Date    HERNIA REPAIR      LAPAROSCOPIC CHOLECYSTECTOMY N/A 5/23/2022    Procedure: CHOLECYSTECTOMY, LAPAROSCOPIC;  Surgeon: Garth Hurley III, MD;  Location: [facility];  Service: General;  Laterality: N/A;       Family History   Problem Relation Age of Onset    Hypertension Mother     Hypertension Father     Hypertension Maternal Aunt     Hypertension Maternal Uncle     No Known Problems Paternal Uncle     Breast cancer Maternal Grandmother     Cancer Maternal Grandmother     Hypertension Maternal Grandmother     Heart disease Maternal Grandfather        Social History     Socioeconomic History    Marital status:    Tobacco Use    Smoking status: Never    Smokeless tobacco: Never   Substance and Sexual Activity    Alcohol use: Not Currently    Drug use: No    Sexual activity: Yes     Partners: Male   Social History Narrative    ** Merged History Encounter **            Current Outpatient Medications   Medication Sig Dispense  "Refill    albuterol (PROVENTIL/VENTOLIN HFA) 90 mcg/actuation inhaler Inhale 2 puffs into the lungs every 6 (six) hours as needed for Wheezing. Rescue She states her mom gave her this inhaler 18 g 2    azelastine (ASTELIN) 137 mcg (0.1 %) nasal spray 1 spray (137 mcg total) by Nasal route 2 (two) times daily. 30 mL 2    fluticasone propionate (FLONASE) 50 mcg/actuation nasal spray 2 sprays (100 mcg total) by Each Nostril route once daily. 15.8 mL 0    HYDROcodone-acetaminophen (NORCO) 5-325 mg per tablet Take 1 tablet by mouth every 6 (six) hours as needed for Pain. 12 tablet 0    hydrOXYzine HCL (ATARAX) 25 MG tablet Take 1 tablet (25 mg total) by mouth 3 (three) times daily as needed for Anxiety. 60 tablet 0    LIDOcaine (LIDODERM) 5 % Place 1 patch onto the skin once daily. Remove & Discard patch within 12 hours or as directed by MD 15 patch 0    naproxen (NAPROSYN) 500 MG tablet Take 1 tablet (500 mg total) by mouth 2 (two) times daily with meals. 15 tablet 0    methocarbamoL (ROBAXIN) 500 MG Tab Take 1 tablet (500 mg total) by mouth nightly as needed (muscle spasm). 30 tablet 2     No current facility-administered medications for this visit.       Review of patient's allergies indicates:  No Known Allergies  Objective:      Blood pressure 120/82, pulse 78, temperature 98.3 °F (36.8 °C), height 5' 7" (1.702 m), weight 80.3 kg (177 lb), last menstrual period 05/29/2023, SpO2 99 %. Body mass index is 27.72 kg/m².   Physical Exam  Vitals and nursing note reviewed.   Constitutional:       General: She is not in acute distress.     Appearance: Normal appearance. She is well-developed.   HENT:      Head: Normocephalic and atraumatic.      Right Ear: Tympanic membrane normal.      Left Ear: Tympanic membrane normal.      Nose: Nose normal.      Mouth/Throat:      Mouth: Mucous membranes are moist.   Eyes:      General: Lids are normal. Lids are everted, no foreign bodies appreciated.      Conjunctiva/sclera: " Conjunctivae normal.      Pupils: Pupils are equal, round, and reactive to light.      Right eye: Pupil is round and reactive.      Left eye: Pupil is round and reactive.   Neck:      Trachea: Trachea normal.   Cardiovascular:      Rate and Rhythm: Normal rate and regular rhythm.      Pulses: Normal pulses.      Heart sounds: Normal heart sounds, S1 normal and S2 normal.   Pulmonary:      Effort: Pulmonary effort is normal.      Breath sounds: Normal breath sounds.   Abdominal:      General: Abdomen is flat. Bowel sounds are normal.      Palpations: Abdomen is soft. Abdomen is not rigid.      Tenderness: There is no guarding.   Musculoskeletal:         General: Normal range of motion.      Cervical back: Normal range of motion and neck supple. No muscular tenderness.        Back:       Comments: Area of pain   Lymphadenopathy:      Cervical: No cervical adenopathy.   Skin:     General: Skin is warm and dry.      Capillary Refill: Capillary refill takes less than 2 seconds.   Neurological:      General: No focal deficit present.      Mental Status: She is alert and oriented to person, place, and time.   Psychiatric:         Mood and Affect: Mood normal.         Behavior: Behavior normal. Behavior is cooperative.         Thought Content: Thought content normal.         Judgment: Judgment normal.           Assessment:       1. Muscle spasm    2. Acute right-sided low back pain without sciatica    3. Overweight (BMI 25.0-29.9)        Plan:       Jane KULKARNI was seen today for hosp. f/u  and back pain.    Diagnoses and all orders for this visit:    Muscle spasm  -     methocarbamoL (ROBAXIN) 500 MG Tab; Take 1 tablet (500 mg total) by mouth nightly as needed (muscle spasm).    Acute right-sided low back pain without sciatica  -     Ambulatory referral/consult to Physical/Occupational Therapy; Future    Follow-up with physical therapy.  If not improving or if severely worsening with physical therapy, return to the office  for orthopedic referral.    Overweight (BMI 25.0-29.9)  The patient is asked to make an attempt to improve diet and exercise patterns to aid in medical management of this problem.

## 2023-06-21 ENCOUNTER — OFFICE VISIT (OUTPATIENT)
Dept: URGENT CARE | Facility: CLINIC | Age: 33
End: 2023-06-21
Payer: MEDICAID

## 2023-06-21 VITALS
HEART RATE: 83 BPM | OXYGEN SATURATION: 100 % | DIASTOLIC BLOOD PRESSURE: 75 MMHG | WEIGHT: 179 LBS | HEIGHT: 67 IN | TEMPERATURE: 97 F | SYSTOLIC BLOOD PRESSURE: 114 MMHG | RESPIRATION RATE: 18 BRPM | BODY MASS INDEX: 28.09 KG/M2

## 2023-06-21 DIAGNOSIS — R30.0 DYSURIA: Primary | ICD-10-CM

## 2023-06-21 LAB
B-HCG UR QL: NEGATIVE
BILIRUB UR QL STRIP: POSITIVE
CTP QC/QA: YES
GLUCOSE UR QL STRIP: POSITIVE
KETONES UR QL STRIP: NEGATIVE
LEUKOCYTE ESTERASE UR QL STRIP: NEGATIVE
PH, POC UA: 6
POC BLOOD, URINE: NEGATIVE
POC NITRATES, URINE: POSITIVE
PROT UR QL STRIP: POSITIVE
SP GR UR STRIP: 1.01 (ref 1–1.03)
UROBILINOGEN UR STRIP-ACNC: 12 (ref 0.1–1.1)

## 2023-06-21 PROCEDURE — 99213 PR OFFICE/OUTPT VISIT, EST, LEVL III, 20-29 MIN: ICD-10-PCS | Mod: S$GLB,,, | Performed by: NURSE PRACTITIONER

## 2023-06-21 PROCEDURE — 81003 POCT URINALYSIS, DIPSTICK, AUTOMATED, W/O SCOPE: ICD-10-PCS | Mod: QW,S$GLB,, | Performed by: NURSE PRACTITIONER

## 2023-06-21 PROCEDURE — 3008F PR BODY MASS INDEX (BMI) DOCUMENTED: ICD-10-PCS | Mod: CPTII,S$GLB,, | Performed by: NURSE PRACTITIONER

## 2023-06-21 PROCEDURE — 3074F SYST BP LT 130 MM HG: CPT | Mod: CPTII,S$GLB,, | Performed by: NURSE PRACTITIONER

## 2023-06-21 PROCEDURE — 81025 POCT URINE PREGNANCY: ICD-10-PCS | Mod: S$GLB,,, | Performed by: NURSE PRACTITIONER

## 2023-06-21 PROCEDURE — 1159F PR MEDICATION LIST DOCUMENTED IN MEDICAL RECORD: ICD-10-PCS | Mod: CPTII,S$GLB,, | Performed by: NURSE PRACTITIONER

## 2023-06-21 PROCEDURE — 1159F MED LIST DOCD IN RCRD: CPT | Mod: CPTII,S$GLB,, | Performed by: NURSE PRACTITIONER

## 2023-06-21 PROCEDURE — 99213 OFFICE O/P EST LOW 20 MIN: CPT | Mod: S$GLB,,, | Performed by: NURSE PRACTITIONER

## 2023-06-21 PROCEDURE — 81025 URINE PREGNANCY TEST: CPT | Mod: S$GLB,,, | Performed by: NURSE PRACTITIONER

## 2023-06-21 PROCEDURE — 3008F BODY MASS INDEX DOCD: CPT | Mod: CPTII,S$GLB,, | Performed by: NURSE PRACTITIONER

## 2023-06-21 PROCEDURE — 3074F PR MOST RECENT SYSTOLIC BLOOD PRESSURE < 130 MM HG: ICD-10-PCS | Mod: CPTII,S$GLB,, | Performed by: NURSE PRACTITIONER

## 2023-06-21 PROCEDURE — 3078F DIAST BP <80 MM HG: CPT | Mod: CPTII,S$GLB,, | Performed by: NURSE PRACTITIONER

## 2023-06-21 PROCEDURE — 3078F PR MOST RECENT DIASTOLIC BLOOD PRESSURE < 80 MM HG: ICD-10-PCS | Mod: CPTII,S$GLB,, | Performed by: NURSE PRACTITIONER

## 2023-06-21 PROCEDURE — 81003 URINALYSIS AUTO W/O SCOPE: CPT | Mod: QW,S$GLB,, | Performed by: NURSE PRACTITIONER

## 2023-06-21 RX ORDER — PHENAZOPYRIDINE HYDROCHLORIDE 100 MG/1
100 TABLET, FILM COATED ORAL 3 TIMES DAILY PRN
Qty: 6 TABLET | Refills: 0 | Status: SHIPPED | OUTPATIENT
Start: 2023-06-21 | End: 2023-06-23

## 2023-06-21 NOTE — PROGRESS NOTES
"Subjective:      Patient ID: Jane Garcia is a 32 y.o. female.    Vitals:  height is 5' 7" (1.702 m) and weight is 81.2 kg (179 lb). Her oral temperature is 97 °F (36.1 °C). Her blood pressure is 114/75 and her pulse is 83. Her respiration is 18 and oxygen saturation is 100%.     Chief Complaint: Urinary Frequency    32-year-old female seen today for dysuria.  States she took a bubble bath last night and awoke approximately 1:00 a.m. this morning with burning on urination.  She denies any bad back pain, abdominal pain, fever or other complaint.  States she took some over-the-counter AZO prior to arrival today    Urinary Frequency   This is a new problem. The current episode started yesterday. The quality of the pain is described as burning. Pertinent negatives include no chills, frequency, urgency or rash. Associated symptoms comments: pressure. Treatments tried: azo.     Constitution: Negative for chills and fever.   Cardiovascular:  Negative for chest pain, palpitations and sob on exertion.   Respiratory:  Negative for shortness of breath.    Gastrointestinal:  Negative for abdominal pain.   Genitourinary:  Positive for dysuria. Negative for frequency and urgency.   Skin:  Negative for rash.   Neurological:  Negative for dizziness, light-headedness, passing out, disorientation and altered mental status.   Psychiatric/Behavioral:  Negative for altered mental status, disorientation and confusion.     Objective:     Physical Exam   Constitutional: She is oriented to person, place, and time. She appears well-developed. She is cooperative.  Non-toxic appearance. She does not appear ill. No distress.   HENT:   Head: Normocephalic and atraumatic.   Ears:   Right Ear: External ear normal.   Left Ear: External ear normal.   Nose: Nose normal.   Mouth/Throat: Oropharynx is clear and moist and mucous membranes are normal. Mucous membranes are moist. Oropharynx is clear.   Eyes: Conjunctivae and lids are normal. No " scleral icterus.   Neck: Trachea normal and phonation normal. Neck supple.   Cardiovascular: Normal rate, regular rhythm, normal heart sounds and normal pulses.   Pulmonary/Chest: Effort normal and breath sounds normal. No stridor. No respiratory distress.   Abdominal: Normal appearance and bowel sounds are normal. She exhibits no distension and no mass. Soft. There is no abdominal tenderness. There is no guarding, no left CVA tenderness and no right CVA tenderness. No hernia.   Musculoskeletal:         General: No deformity.   Neurological: no focal deficit. She is alert and oriented to person, place, and time. She has normal strength and normal reflexes. No sensory deficit.   Skin: Skin is warm, dry, intact and not diaphoretic. Capillary refill takes 2 to 3 seconds.   Psychiatric: Her speech is normal and behavior is normal. Judgment and thought content normal.   Nursing note and vitals reviewed.    Assessment:     1. Dysuria        Plan:       Dysuria  -     phenazopyridine (PYRIDIUM) 100 MG tablet; Take 1 tablet (100 mg total) by mouth 3 (three) times daily as needed for Pain.  Dispense: 6 tablet; Refill: 0  -     POCT Urinalysis, Dipstick, Automated, W/O Scope  -     POCT urine pregnancy    UPT-negative    Urine-leukocyte and blood negative.      I have discussed the test results and physical exam findings with the patient.  I do not suspect UTI, more likely this is related to irritation from bubble bath use.  I am giving her a short prescription for Pyridium until the symptoms subside.  We discussed symptom monitoring, conservative care methods, medication use, and follow up orders.  She verbalized understanding and agreement with the plan of care.

## 2023-06-21 NOTE — PATIENT INSTRUCTIONS
Increase clear fluid intake.    Limit bubble bath use until symptoms resolve.   May take pyridium for bladder spasms/pain  May take tylenol or ibuprofen over the counter for pain or fever  Follow up with PCP   Return to clinic for new symptoms

## 2023-06-23 ENCOUNTER — OFFICE VISIT (OUTPATIENT)
Dept: URGENT CARE | Facility: CLINIC | Age: 33
End: 2023-06-23
Payer: MEDICAID

## 2023-06-23 VITALS
DIASTOLIC BLOOD PRESSURE: 76 MMHG | TEMPERATURE: 97 F | HEIGHT: 67 IN | BODY MASS INDEX: 28.09 KG/M2 | HEART RATE: 95 BPM | OXYGEN SATURATION: 100 % | WEIGHT: 179 LBS | SYSTOLIC BLOOD PRESSURE: 117 MMHG | RESPIRATION RATE: 16 BRPM

## 2023-06-23 DIAGNOSIS — N30.00 ACUTE CYSTITIS WITHOUT HEMATURIA: Primary | ICD-10-CM

## 2023-06-23 PROCEDURE — 99214 PR OFFICE/OUTPT VISIT, EST, LEVL IV, 30-39 MIN: ICD-10-PCS | Mod: S$GLB,,, | Performed by: NURSE PRACTITIONER

## 2023-06-23 PROCEDURE — 99214 OFFICE O/P EST MOD 30 MIN: CPT | Mod: S$GLB,,, | Performed by: NURSE PRACTITIONER

## 2023-06-23 RX ORDER — CIPROFLOXACIN 500 MG/1
500 TABLET ORAL EVERY 12 HOURS
Qty: 14 TABLET | Refills: 0 | Status: SHIPPED | OUTPATIENT
Start: 2023-06-23 | End: 2023-06-30

## 2023-06-23 NOTE — PROGRESS NOTES
"Subjective:      Patient ID: Jane Garcia is a 32 y.o. female.    Vitals:  height is 5' 7" (1.702 m) and weight is 81.2 kg (179 lb). Her temperature is 97.3 °F (36.3 °C). Her blood pressure is 117/76 and her pulse is 95. Her respiration is 16 and oxygen saturation is 100%.     Chief Complaint: Follow-up    Pt was seen on 6/21 for dysuria & irritation after a bubble bath, was dx w/ out UTI but irritation. Was prescribed pyridium, pt states symptoms have not gotten better & have worsened. States she is having a lot of pressure & burning sensation. States took pyridium before coming in today.     Constitution: Negative for sweating, fatigue and fever.   Respiratory: Negative.     Gastrointestinal: Negative.    Genitourinary:  Positive for dysuria, frequency and urgency. Negative for flank pain, bed wetting and hematuria.   Neurological: Negative.     Objective:     Physical Exam   Constitutional: She is oriented to person, place, and time.   HENT:   Head: Normocephalic and atraumatic.   Cardiovascular: Normal rate.   Pulmonary/Chest: Effort normal. No respiratory distress.   Abdominal: Normal appearance. There is no left CVA tenderness and no right CVA tenderness.   Neurological: She is alert and oriented to person, place, and time.   Psychiatric: Her behavior is normal. Mood normal.     Assessment:     1. Acute cystitis without hematuria        Plan:   Urine culture sent, treat with cipro, increase fluids, Follow up with PCP if symptoms are not resolving in 48-72 hours, follow up immediately for new or worsening symptoms, ED precautions discussed.      Acute cystitis without hematuria  -     Urine culture; Future; Expected date: 06/23/2023  -     ciprofloxacin HCl (CIPRO) 500 MG tablet; Take 1 tablet (500 mg total) by mouth every 12 (twelve) hours. for 7 days  Dispense: 14 tablet; Refill: 0                    "

## 2023-06-26 ENCOUNTER — CLINICAL SUPPORT (OUTPATIENT)
Dept: REHABILITATION | Facility: HOSPITAL | Age: 33
End: 2023-06-26
Payer: MEDICAID

## 2023-06-26 DIAGNOSIS — R53.1 DECREASED STRENGTH: ICD-10-CM

## 2023-06-26 DIAGNOSIS — R26.89 DECREASED FUNCTIONAL MOBILITY: ICD-10-CM

## 2023-06-26 DIAGNOSIS — M54.50 ACUTE RIGHT-SIDED LOW BACK PAIN WITHOUT SCIATICA: ICD-10-CM

## 2023-06-26 DIAGNOSIS — M62.89 MUSCLE TIGHTNESS: ICD-10-CM

## 2023-06-26 LAB
BACTERIA UR CULT: NO GROWTH
BACTERIA UR CULT: NORMAL

## 2023-06-26 PROCEDURE — 97110 THERAPEUTIC EXERCISES: CPT | Mod: PN

## 2023-06-26 PROCEDURE — 97161 PT EVAL LOW COMPLEX 20 MIN: CPT | Mod: PN

## 2023-06-26 NOTE — PLAN OF CARE
[facility]  Physical Therapy Initial Evaluation    Name: Jane Garcia  Clinic Number: [id number]    Therapy Diagnosis:   Encounter Diagnoses   Name Primary?    Acute right-sided low back pain without sciatica     Decreased strength     Muscle tightness     Decreased functional mobility      Physician: Barbara Burks FNP-C   Physician Orders: PT Eval and Treat  Medical Diagnosis from Referral: M54.50 (ICD-10-CM) - Acute right-sided low back pain without sciatica   Evaluation Date: 6/26/2023  Authorization Period Expiration: 06/12/2024   Plan of Care Expiration: 9/30/2023    Progress Update: 7/26/2023  FOTO: 1 / 3   Visit # / Visits authorized: 1 / 1       PRECAUTIONS: Standard Precautions     Time In: 1030  Time Out: 1120  Total Appointment Time (timed & untimed codes): 50 minutes    SUBJECTIVE     Chief complaint:  P1:  B low back pain      P2:  B leg pain to knees    History of current condition:  Pain started about 1 month ago.  States that she was bending over and felt a sharp pain up her back to her neck.  States that pain and intensity has been about the same since onset.   States that she has had 4 kids.  Denies numbness/tingling, bowel bladder issues.      Previous episode:  none    Aggravating Factors:  lifting legs, picking up kids, bending down  Easing Factors:  medication    Imaging:      IMPRESSION:     Negative lumbar spine radiography.     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  Prior Level of Function: Independent with all ADLs  Current Level of Function: 65% of PLOF    Pain:  Current 7 /10, worst 10 /10, best 7 /10   Description: Aching, Dull, and Throbbing    Pts goals: Pt reported goal is to be able to bend over without pain.    _______________________________________________________  Medical History:   Past  Medical History:   Diagnosis Date    Allergy     Anxiety        Surgical History:   Jane Garcia  has a past surgical history that includes Hernia repair and Laparoscopic cholecystectomy (N/A, 5/23/2022).    Medications:   Jane KULKARNI has a current medication list which includes the following prescription(s): albuterol, azelastine, ciprofloxacin hcl, fluticasone propionate, hydrocodone-acetaminophen, hydroxyzine hcl, lidocaine, naproxen, and [DISCONTINUED] loratadine.    Allergies:   Review of patient's allergies indicates:  No Known Allergies     OBJECTIVE   (x = not tested due to pain and/or inability to obtain test position)    RANGE OF MOTION:    Lumbar AROM/PROM Right  (spine)  6/26/2023 Left    6/26/2023 Goal   Lumbar Flexion (60) 50 c pain --- 55     Lumbar Extension (30) 25 c pain --- 30     Lumbar Side Bending (25) Wfl c pain wfl 20     Lumbar Rotation wfl wfl 45         Hip AROM/PROM Right  6/26/2023 Left  6/26/2023 Goal   Hip Flexion (120) wfl wfl 115     Hip IR (45) wfl wfl 40     Hip ER (45) wfl wfl 40         Knee AROM/PROM Right  6/26/2023 Left  6/26/2023 Goal   Hyper - Zero - Flexion wfl wfl 0-0-135         STRENGTH:    L/E MMT Right  6/26/2023 Goal   Hip Flexion  4-/5 5/5 B    Hip Extension  4-/5 5/5 B   Hip Abduction  4-/5 5/5 B   Hip IR 4-/5 5/5 B   Hip ER 4-/5 5/5 B   Knee Flexion 4-/5 5/5 B   Knee Extension 4-/5 5/5 B   Ankle DF 4/5 5/5 B   Ankle PF 4/5 5/5 B   Ankle Inversion 4/5 5/5 B   Ankle Eversion 4/5 5/5 B   Big Toe Extension 4/5 5/5 B     L/E MMT Left  6/26/2023 Goal   Hip Flexion  4-/5 5/5 B    Hip Extension  4-/5 5/5 B   Hip Abduction  4-/5 5/5 B   Hip IR 4-/5 5/5 B   Hip ER 4-/5 5/5 B   Knee Flexion 4-/5 5/5 B   Knee Extension 4-/5 5/5 B   Ankle DF 4/5 5/5 B   Ankle PF 4/5 5/5 B   Ankle Inversion 4/5 5/5 B   Ankle Eversion 4/5 5/5 B   Big Toe Extension 4/5 5/5 B       MUSCLE LENGTH:     Muscle Tested  Right  6/26/2023 Left   6/26/2023 Goal   Hip Flexors  decreased decreased Normal  B   Quadriceps normal normal Normal B   Hamstrings  decreased decreased Normal B   Piriformis  normal normal Normal B   Gastrocnemius  decreased decreased Normal B   Soleus  decreased decreased Normal B       SPECIAL TESTS:     Right  (spine)  6/26/2023 Goal   SLR Positive Negative    Crossover SLR Negative Negative    Slump Negative Negative    90/90 Positive Negative   Dheeraj Positive Negative   Standing forward flexion test Negative Negative   Sacral thrust Positive Negative   SIJ Distraction Negative Negative   SIJ Compression Negative Negative       Sensation:  Sensation is intact to light touch    Palpation:  TTP from L1-L5 spinous processes and L paraspinals.    Posture:  Pt presents with postural abnormalities which include: forward head, rounded shoulders, and ankle/foot pronation    Gait Analysis: The patient ambulated with the following assistive device: none; the pt presents with the following gait abnormalities: decreased step length, lebron, and arm swing; increased forward flexed posture, trunk sway     Movement Analysis:   Functional Test  Outcome   Double Leg Squat 1/4 squat with pain   Single Leg Step Down NT           TREATMENT   Total Treatment time separate from Evaluation: (10) minutes    Jontera received therapeutic exercises to develop strength, endurance, ROM, flexibility, and posture for (10) minutes including: x = exercises performed     TherEx 6/26/2023    Seated hamstring stretch x 3x30 secs   Bridges x 3x10   Clams with red tb x 3x10          Plan for Next Visit:     Recumbent bike x 10 minutes     Supine exercises:  Lower trunk rotation 3 x 10 reps   Bridges 3 x 10 reps   Hip adduction with ball 3 x 10 reps   Clams with green theraband  2 x 10 reps bilateral      Double knee to chest with physioball  3 x 10 reps  Open books 2 x 10 reps bilateral   Transverse abdominal sets 2 x 10 reps   Hip flexor stretch 3 x 30 seconds bilateral lower extremity      Seated exercises:  hamstring  "stretch 3 x 30 seconds  bilateral   piriformis stretch on bench  3 x 30 seconds bilateral      Standing exercises:  gastroc stretch 3 x 30 seconds bilateral   Step ups on 4" box  2 x 10 reps   hip extension 2 x 10 reps bilateral lower extremity   Hip abduction in parallel bars 2 x 10 reps bilateral lower extremity        Home Exercises and Patient Education Provided    Education/Self-Care provided:  Patient educated on the impairments noted above and the effects of physical therapy intervention to improve overall condition and quality of life.   Patient was educated on all the above exercise prior/during/after for proper posture, positioning, and execution for safe performance with home exercise program.     Written Home Exercises Provided: yes. Prefers: Electronic Copies  Exercises were reviewed and Jane was able to demonstrate them prior to the end of the session.  Jane demonstrated good understanding of the education provided.     See EMR under Patient Instructions for exercises provided during therapy sessions.    ASSESSMENT   Jane KULKARNI is a 32 y.o. female referred to outpatient Physical Therapy with a medical diagnosis of M54.50 (ICD-10-CM) - Acute right-sided low back pain without sciatica . Jane KULKARNI presents with clinical signs and symptoms that support this diagnosis with decreased Lumbar ROM, decreased lower extremity strength, Lumbar joint(s) hypomobility, lower extremity neural tension, and impaired functional mobility. Radicular symptoms are present from the lumbar spine down into both legs to knees.  The above impairments will be addressed through manual therapy techniques, therapeutic exercises, functional training, and modalities as necessary. Patient was treated and educated on exercises for home program, progression of therapy, and benefits of therapy to achieve full functional mobility. Patient will benefit from skilled physical therapy to meet short and long term goals and return to prior " "level of function.    Pt prognosis is Good.   Pt will benefit from skilled outpatient Physical Therapy to address the deficits stated above and in the chart below, provide pt/family education, and to maximize pt's level of independence.     Plan of care discussed with patient: Yes  Pt's spiritual, cultural and educational needs considered and patient is agreeable to the plan of care and goals as stated below:     Anticipated Barriers for therapy: none    Medical Necessity is demonstrated by the following:   History  Co-morbidities and personal factors that may impact the plan of care Co-morbidities:   High bmi    Personal Factors:   no deficits     low   Examination  Body Structures and Functions, activity limitations and participation restrictions that may impact the plan of care Body Regions:   back    Body Systems:    gross symmetry  ROM  strength  gross coordinated movement  motor control  motor learning    Participation Restrictions:   See above in "Current Level of Function"     Activity limitations:   Learning and applying knowledge  no deficits    General Tasks and Commands  no deficits    Communication  no deficits    Mobility  no deficits    Self care  no deficits    Domestic Life  no deficits    Interactions/Relationships  no deficits    Life Areas  no deficits    Community and Social Life  community life  recreation and leisure  no deficits         low   Clinical Presentation stable and uncomplicated low   Decision Making/ Complexity Score: low       GOALS:  SHORT TERM GOALS: 4 weeks 6/26/2023   Recent signs and systems trend is improving in order to progress towards LTG's.    Patient will be independent with HEP in order to further progress and return to maximal function.    Pain rating at Worst: 5/10 in order to progress towards increased independence with activity.    Patient will be able to correct postural deviations in sitting and standing, to decrease pain and promote postural awareness for injury " prevention.       LONG TERM GOALS: 8 weeks 6/26/2023   Patient will return to normal ADL, recreational, and work related activities with less pain and limitation.     Patient will improve AROM to stated goals in order to return to maximal functional potential.     Patient will improve Strength to stated goals of appropriate musculature in order to improve functional independence.     Pain Rating at Best: 1/10 to improve Quality of Life.     Patient will meet predicted functional outcome (FOTO) score: 80% to increase self-worth & perceived functional ability.    Patient will have met/partially met personal goal of being able to bend over with no pain.          PLAN   Plan of care Certification: 6/26/2023 to 9/30/2023    Outpatient Physical Therapy 2 times weekly for 8 weeks to include any combination of the following interventions: virtual visits, dry needling, modalities, electrical stimulation (IFC, Pre-Mod, Attended with Functional Dry Needling), Manual Therapy, Moist Heat/ Ice, Neuromuscular Re-ed, Patient Education, Self Care, Therapeutic Exercise, Functional Training, and Therapeutic Activites     Thank you for this referral.    These services are reasonable and necessary for the conditions set forth above while under my care.    Ramon Preston, PT, DPT

## 2023-07-05 ENCOUNTER — CLINICAL SUPPORT (OUTPATIENT)
Dept: REHABILITATION | Facility: HOSPITAL | Age: 33
End: 2023-07-05
Payer: MEDICAID

## 2023-07-05 DIAGNOSIS — R53.1 DECREASED STRENGTH: Primary | ICD-10-CM

## 2023-07-05 DIAGNOSIS — R26.89 DECREASED FUNCTIONAL MOBILITY: ICD-10-CM

## 2023-07-05 DIAGNOSIS — M62.89 MUSCLE TIGHTNESS: ICD-10-CM

## 2023-07-05 PROCEDURE — 97110 THERAPEUTIC EXERCISES: CPT | Mod: PN,CQ

## 2023-07-05 NOTE — PROGRESS NOTES
"[facility]   Physical Therapy Treatment Note      Name: Jane Garcia  Clinic Number: [id number]    Therapy Diagnosis:   Encounter Diagnoses   Name Primary?    Decreased strength Yes    Muscle tightness     Decreased functional mobility      Physician: Barbara Burks FNP-C    Visit Date: 7/5/2023    Physician: Barbara Burks FNP-C              Physician Orders: PT Eval and Treat  Medical Diagnosis from Referral: M54.50 (ICD-10-CM) - Acute right-sided low back pain without sciatica   Evaluation Date: 6/26/2023  Authorization Period Expiration: 12/31/2023  Plan of Care Expiration: 9/30/2023                Progress Update: 7/26/2023             FOTO: 1 / 3   Visit # / Visits authorized: 2 / 20 (Plan of Care 1/20)                       PTA Visit #: 1/5     PRECAUTIONS: Standard Precautions      Time In: 1000  Time Out: 1059  Total Appointment Time (timed & untimed codes): 59 minutes       Subjective     Pt reports: "feeling ok.  Better than last week"  She was compliant with home exercise program.  Response to previous treatment: no complaints  Functional change:     Pain: 5/10  Location: left back ,Left  legs, and hip      Objective      Objective Measures updated at progress report unless specified.     Treatment     Jane received the treatments listed below:      therapeutic exercises to develop strength, endurance, ROM, flexibility, posture, and core stabilization for 59 minutes including:    Recumbent bike x 10 minutes     Supine exercises:  Lower trunk rotation 2 x 10 reps   Bridges 2 x 10 reps   Hip adduction with ball 2 x 10 reps   Clams with green theraband  2 x 10 reps bilateral   Double knee to chest with physioball  3 x 10 reps  Open books 2 x 10 reps bilateral   Transverse abdominal sets 2 x 10 reps   Hip flexor stretch 3 x 30 seconds bilateral lower extremity      Seated exercises:  hamstring stretch 3 x 30 seconds bilateral   piriformis stretch on bench 3 x 30 " "seconds bilateral      Standing exercises:  gastroc stretch 3 x 30 seconds bilateral   Step ups on 4" box  2 x 10 reps (not performed)  hip extension 2 x 10 reps bilateral lower extremity   Hip abduction in parallel bars  x 10 reps bilateral lower extremity       Patient Education and Home Exercises       Education provided:   -apply ice as needed for delayed muscle soreness  -Continue with Home exercise program daily     Written Home Exercises Provided: yes. Exercises were reviewed and Jane was able to demonstrate them prior to the end of the session.  Jane demonstrated good  understanding of the education provided. See EMR under Patient Instructions for exercises provided during therapy sessions    Assessment     Jane provided good effort and participation toward therapeutic interventions today with focus on  lumbar range of motion.  Pt performed all exercises well with out reports of pain.      Jane Is progressing well towards her goals.   Pt prognosis is Good.     Pt will continue to benefit from skilled outpatient physical therapy to address the deficits listed in the problem list box on initial evaluation, provide pt/family education and to maximize pt's level of independence in the home and community environment.     Pt's spiritual, cultural and educational needs considered and pt agreeable to plan of care and goals.     Anticipated barriers to physical therapy: none known    Goals:   SHORT TERM GOALS: 4 weeks 6/26/2023   Recent signs and systems trend is improving in order to progress towards LTG's.     Patient will be independent with HEP in order to further progress and return to maximal function.     Pain rating at Worst: 5/10 in order to progress towards increased independence with activity.     Patient will be able to correct postural deviations in sitting and standing, to decrease pain and promote postural awareness for injury prevention.         LONG TERM GOALS: 8 weeks 6/26/2023   Patient " will return to normal ADL, recreational, and work related activities with less pain and limitation.      Patient will improve AROM to stated goals in order to return to maximal functional potential.      Patient will improve Strength to stated goals of appropriate musculature in order to improve functional independence.      Pain Rating at Best: 1/10 to improve Quality of Life.      Patient will meet predicted functional outcome (FOTO) score: 80% to increase self-worth & perceived functional ability.     Patient will have met/partially met personal goal of being able to bend over with no pain.              PLAN   Plan of care Certification: 6/26/2023 to 9/30/2023     Outpatient Physical Therapy 2 times weekly for 8 weeks to include any combination of the following interventions: virtual visits, dry needling, modalities, electrical stimulation (IFC, Pre-Mod, Attended with Functional Dry Needling), Manual Therapy, Moist Heat/ Ice, Neuromuscular Re-ed, Patient Education, Self Care, Therapeutic Exercise, Functional Training, and Therapeutic Activites        Yolie Ag, ROBERTO

## 2023-07-12 ENCOUNTER — CLINICAL SUPPORT (OUTPATIENT)
Dept: REHABILITATION | Facility: HOSPITAL | Age: 33
End: 2023-07-12
Payer: MEDICAID

## 2023-07-12 DIAGNOSIS — M62.89 MUSCLE TIGHTNESS: ICD-10-CM

## 2023-07-12 DIAGNOSIS — R26.89 DECREASED FUNCTIONAL MOBILITY: ICD-10-CM

## 2023-07-12 DIAGNOSIS — R53.1 DECREASED STRENGTH: Primary | ICD-10-CM

## 2023-07-12 PROCEDURE — 97110 THERAPEUTIC EXERCISES: CPT | Mod: PN,CQ

## 2023-07-12 NOTE — PROGRESS NOTES
[facility]   Physical Therapy Treatment Note      Name: Jane Garcia  Clinic Number: [id number]    Therapy Diagnosis:   Encounter Diagnoses   Name Primary?    Decreased strength Yes    Muscle tightness     Decreased functional mobility        Physician: Barbara Burks FNP-C    Visit Date: 7/12/2023    Physician: Barbara Burks FNP-C              Physician Orders: PT Eval and Treat  Medical Diagnosis from Referral: M54.50 (ICD-10-CM) - Acute right-sided low back pain without sciatica   Evaluation Date: 6/26/2023  Authorization Period Expiration: 12/31/2023  Plan of Care Expiration: 9/30/2023                Progress Update: 7/26/2023             FOTO: 1 / 3   Visit # / Visits authorized:  3/20 (Plan of Care 2/20)                       PTA Visit #:  2/5     PRECAUTIONS: Standard Precautions      Time In: 1000  Time Out: 1100  Total Billable Time: 30  minutes  Total Unbillable Time:  30 minutes    Subjective     Pt reports: she is having pain today  She was compliant with home exercise program.  Response to previous treatment: no complaints  Functional change:     Pain: 3-4/10  Location: left back ,Left  legs, and hip      Objective      Objective Measures updated at progress report unless specified.     Treatment     Jane received the treatments listed below:      therapeutic exercises to develop strength, endurance, ROM, flexibility, posture, and core stabilization for 60 minutes including:    Recumbent bike x 10 minute, level 3     Supine exercises:  Lower trunk rotation 2 x 10 reps   Bridges 2 x 10 reps   Hip adduction with ball 2 x 10 reps   Clams with green theraband  2 x 10 reps bilateral   Double knee to chest with physioball  3 x 10 reps  Open books 2 x 10 reps bilateral   Transverse abdominal sets 2 x 10 reps   Hip flexor stretch 3 x 30 seconds bilateral lower extremity     Muscle energy technique/shotgun x 1    Prone exercises:  On elbows 1'  Press ups 2 x 10 reps  "(with over pressure)     Seated exercises:  hamstring stretch 3 x 30 seconds bilateral   piriformis stretch on bench 3 x 30 seconds bilateral      Standing exercises:  gastroc stretch 3 x 30 seconds bilateral   Step ups on 4" box  2 x 10 reps (not performed)  hip extension 2 x 10 reps bilateral lower extremity   Hip abduction in parallel bars  x 10 reps bilateral lower extremity   Lumbar extension over parallel bars x 10 reps     Patient Education and Home Exercises       Education provided:   -apply ice as needed for delayed muscle soreness  -Continue with Home exercise program daily     Written Home Exercises Provided: yes. Exercises were reviewed and Jane was able to demonstrate them prior to the end of the session.  Jane demonstrated good  understanding of the education provided. See EMR under Patient Instructions for exercises provided during therapy sessions    Assessment     Jane provided good effort and participation toward therapeutic interventions today with focus on  lumbar range of motion.  She responded well with press ups and Muscle energy technique.  Patient reported discomfort while doing exercises but stated feeling better after.    Jane Is progressing well towards her goals.   Pt prognosis is Good.     Pt will continue to benefit from skilled outpatient physical therapy to address the deficits listed in the problem list box on initial evaluation, provide pt/family education and to maximize pt's level of independence in the home and community environment.     Pt's spiritual, cultural and educational needs considered and pt agreeable to plan of care and goals.     Anticipated barriers to physical therapy: none known    Goals:   SHORT TERM GOALS: 4 weeks 6/26/2023   Recent signs and systems trend is improving in order to progress towards LTG's.     Patient will be independent with HEP in order to further progress and return to maximal function.     Pain rating at Worst: 5/10 in order to " progress towards increased independence with activity.     Patient will be able to correct postural deviations in sitting and standing, to decrease pain and promote postural awareness for injury prevention.         LONG TERM GOALS: 8 weeks 6/26/2023   Patient will return to normal ADL, recreational, and work related activities with less pain and limitation.      Patient will improve AROM to stated goals in order to return to maximal functional potential.      Patient will improve Strength to stated goals of appropriate musculature in order to improve functional independence.      Pain Rating at Best: 1/10 to improve Quality of Life.      Patient will meet predicted functional outcome (FOTO) score: 80% to increase self-worth & perceived functional ability.     Patient will have met/partially met personal goal of being able to bend over with no pain.              PLAN   Plan of care Certification: 6/26/2023 to 9/30/2023     Outpatient Physical Therapy 2 times weekly for 8 weeks to include any combination of the following interventions: virtual visits, dry needling, modalities, electrical stimulation (IFC, Pre-Mod, Attended with Functional Dry Needling), Manual Therapy, Moist Heat/ Ice, Neuromuscular Re-ed, Patient Education, Self Care, Therapeutic Exercise, Functional Training, and Therapeutic Activites        Yolie Ag, ROBERTO

## 2023-07-19 ENCOUNTER — CLINICAL SUPPORT (OUTPATIENT)
Dept: REHABILITATION | Facility: HOSPITAL | Age: 33
End: 2023-07-19
Payer: MEDICAID

## 2023-07-19 DIAGNOSIS — R26.89 DECREASED FUNCTIONAL MOBILITY: ICD-10-CM

## 2023-07-19 DIAGNOSIS — R53.1 DECREASED STRENGTH: Primary | ICD-10-CM

## 2023-07-19 DIAGNOSIS — M62.89 MUSCLE TIGHTNESS: ICD-10-CM

## 2023-07-19 PROCEDURE — 97110 THERAPEUTIC EXERCISES: CPT | Mod: PN

## 2023-07-19 NOTE — PROGRESS NOTES
[facility]   Physical Therapy Treatment Note      Name: Jane Garcia  Clinic Number: [id number]    Therapy Diagnosis:   Encounter Diagnoses   Name Primary?    Decreased strength Yes    Muscle tightness     Decreased functional mobility        Physician: Barbara Burks FNP-C    Visit Date: 7/19/2023    Physician: Barbara Burks FNP-C              Physician Orders: PT Eval and Treat  Medical Diagnosis from Referral: M54.50 (ICD-10-CM) - Acute right-sided low back pain without sciatica   Evaluation Date: 6/26/2023  Authorization Period Expiration: 12/31/2023  Plan of Care Expiration: 9/30/2023                Progress Update: 7/26/2023             FOTO: 1 / 3   Visit # / Visits authorized:  3/20 (Plan of Care 2/20)                       PTA Visit #:  2/5     PRECAUTIONS: Standard Precautions      Time In: 1020   (Pnt arrived early)  Time Out: 1113  Total Billable Time: 53  minutes    Subjective     Pt reports: that she is about the same as last visit.  She was compliant with home exercise program.  Response to previous treatment: no complaints  Functional change:     Pain: 2/10  Location: left back ,Left  legs, and hip      Objective      Objective Measures updated at progress report unless specified.     Treatment     Jane received the treatments listed below:      therapeutic exercises to develop strength, endurance, ROM, flexibility, posture, and core stabilization for 53 minutes including:    Recumbent bike x 10 minutes     Supine exercises:  Lower trunk rotation 3 x 10 reps   Bridges 3 x 10 reps   Hip adduction with ball 3 x 10 reps   Clams with green theraband  2 x 10 reps bilateral      Double knee to chest with physioball  3 x 10 reps  Open books 2 x 10 reps bilateral   Transverse abdominal sets 2 x 10 reps   Hip flexor stretch 3 x 30 seconds bilateral lower extremity     Prone exercises:  On elbows 1'  Press ups 2 x 10 reps (with over pressure)     Seated  "exercises:  hamstring stretch 3 x 30 seconds  bilateral   piriformis stretch on bench  3 x 30 seconds bilateral      Standing exercises:  gastroc stretch 3 x 30 seconds bilateral   Step ups on 4" box  2 x 10 reps   hip extension 2 x 10 reps bilateral lower extremity   Hip abduction in parallel bars 2 x 10 reps bilateral lower extremity     Patient Education and Home Exercises       Education provided:   -apply ice as needed for delayed muscle soreness  -Continue with Home exercise program daily     Written Home Exercises Provided: yes. Exercises were reviewed and Jane was able to demonstrate them prior to the end of the session.  Jane demonstrated good  understanding of the education provided. See EMR under Patient Instructions for exercises provided during therapy sessions    Assessment     Jane provided good effort and participation toward therapeutic interventions today with focus on  lumbar range of motion.  She responded well with press ups and Muscle energy technique.  Patient reported discomfort while doing exercises but stated feeling better after.    Jane Is progressing well towards her goals.   Pt prognosis is Good.     Pt will continue to benefit from skilled outpatient physical therapy to address the deficits listed in the problem list box on initial evaluation, provide pt/family education and to maximize pt's level of independence in the home and community environment.     Pt's spiritual, cultural and educational needs considered and pt agreeable to plan of care and goals.     Anticipated barriers to physical therapy: none known    Goals:   SHORT TERM GOALS: 4 weeks 7/19/2023     Recent signs and systems trend is improving in order to progress towards LTG's. ongoing   Patient will be independent with HEP in order to further progress and return to maximal function. ongoing   Pain rating at Worst: 5/10 in order to progress towards increased independence with activity. ongoing   Patient will be " able to correct postural deviations in sitting and standing, to decrease pain and promote postural awareness for injury prevention.  ongoing      LONG TERM GOALS: 8 weeks 7/19/2023     Patient will return to normal ADL, recreational, and work related activities with less pain and limitation.  ongoing   Patient will improve AROM to stated goals in order to return to maximal functional potential.  ongoing   Patient will improve Strength to stated goals of appropriate musculature in order to improve functional independence.  ongoing   Pain Rating at Best: 1/10 to improve Quality of Life.  ongoing   Patient will meet predicted functional outcome (FOTO) score: 80% to increase self-worth & perceived functional ability. ongoing   Patient will have met/partially met personal goal of being able to bend over with no pain. ongoing            PLAN     Continue POC as previously stated.    Ramon Preston, PT

## 2023-08-01 ENCOUNTER — OFFICE VISIT (OUTPATIENT)
Dept: FAMILY MEDICINE | Facility: CLINIC | Age: 33
End: 2023-08-01
Payer: MEDICAID

## 2023-08-01 VITALS
OXYGEN SATURATION: 95 % | DIASTOLIC BLOOD PRESSURE: 64 MMHG | HEIGHT: 67 IN | WEIGHT: 181 LBS | SYSTOLIC BLOOD PRESSURE: 104 MMHG | HEART RATE: 93 BPM | BODY MASS INDEX: 28.41 KG/M2

## 2023-08-01 DIAGNOSIS — H66.001 NON-RECURRENT ACUTE SUPPURATIVE OTITIS MEDIA OF RIGHT EAR WITHOUT SPONTANEOUS RUPTURE OF TYMPANIC MEMBRANE: ICD-10-CM

## 2023-08-01 DIAGNOSIS — R51.9 ACUTE NONINTRACTABLE HEADACHE, UNSPECIFIED HEADACHE TYPE: Primary | ICD-10-CM

## 2023-08-01 DIAGNOSIS — J02.9 SORE THROAT: ICD-10-CM

## 2023-08-01 DIAGNOSIS — J01.00 ACUTE MAXILLARY SINUSITIS, RECURRENCE NOT SPECIFIED: ICD-10-CM

## 2023-08-01 LAB
CTP QC/QA: YES
CTP QC/QA: YES
MOLECULAR STREP A: NEGATIVE
SARS-COV-2 RDRP RESP QL NAA+PROBE: NEGATIVE

## 2023-08-01 PROCEDURE — 3008F BODY MASS INDEX DOCD: CPT | Mod: CPTII,,, | Performed by: NURSE PRACTITIONER

## 2023-08-01 PROCEDURE — 99999PBSHW POCT STREP A MOLECULAR: Mod: PBBFAC,,,

## 2023-08-01 PROCEDURE — 87651 STREP A DNA AMP PROBE: CPT | Mod: PBBFAC | Performed by: NURSE PRACTITIONER

## 2023-08-01 PROCEDURE — 99999PBSHW: ICD-10-PCS | Mod: PBBFAC,,,

## 2023-08-01 PROCEDURE — 1159F MED LIST DOCD IN RCRD: CPT | Mod: CPTII,,, | Performed by: NURSE PRACTITIONER

## 2023-08-01 PROCEDURE — 87635 SARS-COV-2 COVID-19 AMP PRB: CPT | Mod: PBBFAC | Performed by: NURSE PRACTITIONER

## 2023-08-01 PROCEDURE — 1160F RVW MEDS BY RX/DR IN RCRD: CPT | Mod: CPTII,,, | Performed by: NURSE PRACTITIONER

## 2023-08-01 PROCEDURE — 99213 OFFICE O/P EST LOW 20 MIN: CPT | Performed by: NURSE PRACTITIONER

## 2023-08-01 PROCEDURE — 3008F PR BODY MASS INDEX (BMI) DOCUMENTED: ICD-10-PCS | Mod: CPTII,,, | Performed by: NURSE PRACTITIONER

## 2023-08-01 PROCEDURE — 3074F SYST BP LT 130 MM HG: CPT | Mod: CPTII,,, | Performed by: NURSE PRACTITIONER

## 2023-08-01 PROCEDURE — 1159F PR MEDICATION LIST DOCUMENTED IN MEDICAL RECORD: ICD-10-PCS | Mod: CPTII,,, | Performed by: NURSE PRACTITIONER

## 2023-08-01 PROCEDURE — 99999PBSHW: Mod: PBBFAC,,,

## 2023-08-01 PROCEDURE — 99214 OFFICE O/P EST MOD 30 MIN: CPT | Mod: S$PBB,,, | Performed by: NURSE PRACTITIONER

## 2023-08-01 PROCEDURE — 3074F PR MOST RECENT SYSTOLIC BLOOD PRESSURE < 130 MM HG: ICD-10-PCS | Mod: CPTII,,, | Performed by: NURSE PRACTITIONER

## 2023-08-01 PROCEDURE — 1160F PR REVIEW ALL MEDS BY PRESCRIBER/CLIN PHARMACIST DOCUMENTED: ICD-10-PCS | Mod: CPTII,,, | Performed by: NURSE PRACTITIONER

## 2023-08-01 PROCEDURE — 3078F DIAST BP <80 MM HG: CPT | Mod: CPTII,,, | Performed by: NURSE PRACTITIONER

## 2023-08-01 PROCEDURE — 99214 PR OFFICE/OUTPT VISIT, EST, LEVL IV, 30-39 MIN: ICD-10-PCS | Mod: S$PBB,,, | Performed by: NURSE PRACTITIONER

## 2023-08-01 PROCEDURE — 3078F PR MOST RECENT DIASTOLIC BLOOD PRESSURE < 80 MM HG: ICD-10-PCS | Mod: CPTII,,, | Performed by: NURSE PRACTITIONER

## 2023-08-01 RX ORDER — AMOXICILLIN AND CLAVULANATE POTASSIUM 875; 125 MG/1; MG/1
1 TABLET, FILM COATED ORAL 2 TIMES DAILY
Qty: 20 TABLET | Refills: 0 | Status: SHIPPED | OUTPATIENT
Start: 2023-08-01 | End: 2023-08-24 | Stop reason: ALTCHOICE

## 2023-08-01 RX ORDER — METHOCARBAMOL 500 MG/1
500 TABLET, FILM COATED ORAL 2 TIMES DAILY
COMMUNITY
Start: 2023-07-12 | End: 2023-09-12

## 2023-08-01 NOTE — PROGRESS NOTES
Subjective:       Patient ID: Jane Garcia is a 32 y.o. female.    Chief Complaint: Dizziness, Headache, and Sore Throat (Itchy scratchy coughing/X4 days)    Dizziness:   Chronicity:  Recurrent  Progression since onset:  Waxing and waning  Severity:  Moderate  Duration:  5 minutes  Dizziness characteristics:  Sensation of movement and spacial disorientation  Frequency of Spells:  Daily   Associated symptoms: ear congestion, ear pain and headaches.no hearing loss, no fever, no nausea, no vomiting, no weakness, no palpitations, no slurred speech, no numbness in extremities and no chest pain.  Aggravated by:  Position changes  Treatments tried:  Rest  Improvements on treatment:  Mildno head trauma and no head trauma.  Headache   This is a new problem. The current episode started 1 to 4 weeks ago. The problem occurs daily. The problem has been waxing and waning. The pain is located in the Frontal region. Radiates to: right side of neck. The quality of the pain is described as throbbing. The pain is moderate. Associated symptoms include dizziness, ear pain, muscle aches, neck pain, sinus pressure and a sore throat. Pertinent negatives include no abdominal pain, coughing, eye pain, fever, hearing loss, nausea, photophobia, rhinorrhea, vomiting or weakness. The symptoms are aggravated by fatigue. The treatment provided moderate relief. There is no history of recent head traumas.   Sore Throat   This is a new problem. The current episode started in the past 7 days. The problem has been waxing and waning. Neither side of throat is experiencing more pain than the other. There has been no fever. The pain is moderate. Associated symptoms include ear pain, headaches and neck pain. Pertinent negatives include no abdominal pain, congestion, coughing, diarrhea, ear discharge, shortness of breath, trouble swallowing or vomiting. She has had no exposure to strep or mono. She has tried cool liquids for the symptoms. The  treatment provided mild relief.     Review of Systems   Constitutional:  Positive for activity change. Negative for appetite change, fever and unexpected weight change.   HENT:  Positive for ear pain, postnasal drip, sinus pressure, sinus pain and sore throat. Negative for congestion, ear discharge, hearing loss, rhinorrhea, trouble swallowing and voice change.    Eyes:  Positive for visual disturbance. Negative for photophobia, pain and discharge.   Respiratory:  Negative for cough, chest tightness, shortness of breath and wheezing.    Cardiovascular:  Negative for chest pain and palpitations.   Gastrointestinal:  Negative for abdominal pain, blood in stool, constipation, diarrhea, nausea and vomiting.   Endocrine: Negative for cold intolerance, heat intolerance, polydipsia and polyuria.   Genitourinary:  Negative for difficulty urinating, dysuria, hematuria and menstrual problem.   Musculoskeletal:  Positive for neck pain. Negative for arthralgias, gait problem and joint swelling.   Skin:  Negative for rash.   Allergic/Immunologic: Negative for immunocompromised state.   Neurological:  Positive for dizziness and headaches. Negative for speech difficulty and weakness.   Psychiatric/Behavioral:  Negative for confusion, dysphoric mood, self-injury and suicidal ideas.        Past Medical History:   Diagnosis Date    Allergy     Anxiety       Past Surgical History:   Procedure Laterality Date    HERNIA REPAIR      LAPAROSCOPIC CHOLECYSTECTOMY N/A 5/23/2022    Procedure: CHOLECYSTECTOMY, LAPAROSCOPIC;  Surgeon: Garth Hurley III, MD;  Location: [facility];  Service: General;  Laterality: N/A;       Family History   Problem Relation Age of Onset    Hypertension Mother     Hypertension Father     Hypertension Maternal Aunt     Hypertension Maternal Uncle     No Known Problems Paternal Uncle     Breast cancer Maternal Grandmother     Cancer Maternal Grandmother     Hypertension Maternal Grandmother     Heart disease  "Maternal Grandfather        Social History     Socioeconomic History    Marital status:    Tobacco Use    Smoking status: Never    Smokeless tobacco: Never   Substance and Sexual Activity    Alcohol use: Not Currently    Drug use: No    Sexual activity: Yes     Partners: Male   Social History Narrative    ** Merged History Encounter **            Current Outpatient Medications   Medication Sig Dispense Refill    albuterol (PROVENTIL/VENTOLIN HFA) 90 mcg/actuation inhaler Inhale 2 puffs into the lungs every 6 (six) hours as needed for Wheezing. Rescue She states her mom gave her this inhaler 18 g 2    fluticasone propionate (FLONASE) 50 mcg/actuation nasal spray 2 sprays (100 mcg total) by Each Nostril route once daily. 15.8 mL 0    methocarbamoL (ROBAXIN) 500 MG Tab       amoxicillin-clavulanate 875-125mg (AUGMENTIN) 875-125 mg per tablet Take 1 tablet by mouth 2 (two) times daily. 20 tablet 0    azelastine (ASTELIN) 137 mcg (0.1 %) nasal spray 1 spray (137 mcg total) by Nasal route 2 (two) times daily. 30 mL 2    LIDOcaine (LIDODERM) 5 % Place 1 patch onto the skin once daily. Remove & Discard patch within 12 hours or as directed by MD 15 patch 0     No current facility-administered medications for this visit.       Review of patient's allergies indicates:  No Known Allergies  Objective:    HPI     Sore Throat     Additional comments: Itchy scratchy coughing  X4 days          Last edited by Jenna Wilson LPN on 8/1/2023  2:35 PM.      Blood pressure 104/64, pulse 93, height 5' 7" (1.702 m), weight 82.1 kg (181 lb), last menstrual period 07/25/2023, SpO2 95 %. Body mass index is 28.35 kg/m².   Physical Exam  Vitals and nursing note reviewed.   Constitutional:       General: She is not in acute distress.     Appearance: Normal appearance. She is well-developed. She is not ill-appearing.   HENT:      Head: Normocephalic and atraumatic.      Right Ear: Ear canal and external ear normal. A middle ear effusion " is present. Tympanic membrane is bulging.      Left Ear: Ear canal and external ear normal. A middle ear effusion is present. Tympanic membrane is bulging.      Nose: Mucosal edema and rhinorrhea present.      Right Sinus: Frontal sinus tenderness present.      Left Sinus: Frontal sinus tenderness present.      Mouth/Throat:      Mouth: Mucous membranes are moist.      Pharynx: Oropharyngeal exudate present. No pharyngeal swelling, posterior oropharyngeal erythema or uvula swelling.      Tonsils: No tonsillar exudate or tonsillar abscesses.   Eyes:      General: Lids are normal. Lids are everted, no foreign bodies appreciated.      Conjunctiva/sclera: Conjunctivae normal.      Pupils: Pupils are equal, round, and reactive to light.      Right eye: Pupil is round and reactive.      Left eye: Pupil is round and reactive.   Neck:      Trachea: Trachea normal.   Cardiovascular:      Rate and Rhythm: Normal rate and regular rhythm.      Pulses: Normal pulses.      Heart sounds: Normal heart sounds, S1 normal and S2 normal.   Pulmonary:      Effort: Pulmonary effort is normal.      Breath sounds: Normal breath sounds. No stridor, decreased air movement or transmitted upper airway sounds. No decreased breath sounds or wheezing.   Abdominal:      General: Abdomen is flat. Bowel sounds are normal.      Palpations: Abdomen is soft. Abdomen is not rigid.      Tenderness: There is no guarding.   Musculoskeletal:         General: Normal range of motion.      Cervical back: Normal range of motion and neck supple. No muscular tenderness.   Lymphadenopathy:      Cervical: No cervical adenopathy.   Skin:     General: Skin is warm and dry.      Capillary Refill: Capillary refill takes less than 2 seconds.   Neurological:      General: No focal deficit present.      Mental Status: She is alert and oriented to person, place, and time.   Psychiatric:         Mood and Affect: Mood normal.         Behavior: Behavior normal. Behavior is  cooperative.         Thought Content: Thought content normal.         Judgment: Judgment normal.             Assessment:       1. Acute nonintractable headache, unspecified headache type    2. Sore throat    3. Acute maxillary sinusitis, recurrence not specified    4. Non-recurrent acute suppurative otitis media of right ear without spontaneous rupture of tympanic membrane        Plan:       Jane KULKARNI was seen today for dizziness, headache and sore throat.    Diagnoses and all orders for this visit:    Acute nonintractable headache, unspecified headache type  -     POCT COVID-19 Rapid Screening (neg)  -     POCT Strep A, Molecular (neg)    Sore throat  -     POCT COVID-19 Rapid Screening  -     POCT Strep A, Molecular    Acute maxillary sinusitis, recurrence not specified  -     amoxicillin-clavulanate 875-125mg (AUGMENTIN) 875-125 mg per tablet; Take 1 tablet by mouth 2 (two) times daily.    Non-recurrent acute suppurative otitis media of right ear without spontaneous rupture of tympanic membrane  -     amoxicillin-clavulanate 875-125mg (AUGMENTIN) 875-125 mg per tablet; Take 1 tablet by mouth 2 (two) times daily.           I spent a total of 30 minutes on the day of the visit.This includes face to face time and non-face to face time preparing to see the patient (eg, review of tests), obtaining and/or reviewing separately obtained history, documenting clinical information in the electronic or other health record, independently interpreting results and communicating results to the patient/family/caregiver, or care coordinator.

## 2023-08-09 ENCOUNTER — TELEPHONE (OUTPATIENT)
Dept: FAMILY MEDICINE | Facility: CLINIC | Age: 33
End: 2023-08-09

## 2023-08-09 DIAGNOSIS — T36.95XA ANTIBIOTIC-INDUCED YEAST INFECTION: Primary | ICD-10-CM

## 2023-08-09 DIAGNOSIS — B37.9 ANTIBIOTIC-INDUCED YEAST INFECTION: Primary | ICD-10-CM

## 2023-08-09 RX ORDER — FLUCONAZOLE 150 MG/1
150 TABLET ORAL DAILY
Qty: 1 TABLET | Refills: 0 | Status: SHIPPED | OUTPATIENT
Start: 2023-08-09 | End: 2023-08-10

## 2023-08-24 ENCOUNTER — OFFICE VISIT (OUTPATIENT)
Dept: FAMILY MEDICINE | Facility: CLINIC | Age: 33
End: 2023-08-24
Payer: MEDICAID

## 2023-08-24 VITALS
OXYGEN SATURATION: 99 % | SYSTOLIC BLOOD PRESSURE: 110 MMHG | WEIGHT: 181.38 LBS | DIASTOLIC BLOOD PRESSURE: 80 MMHG | TEMPERATURE: 98 F | BODY MASS INDEX: 28.47 KG/M2 | RESPIRATION RATE: 18 BRPM | HEART RATE: 99 BPM | HEIGHT: 67 IN

## 2023-08-24 DIAGNOSIS — R06.2 WHEEZING: ICD-10-CM

## 2023-08-24 DIAGNOSIS — R05.1 ACUTE COUGH: Primary | ICD-10-CM

## 2023-08-24 PROCEDURE — 99213 OFFICE O/P EST LOW 20 MIN: CPT | Mod: S$PBB,,, | Performed by: NURSE PRACTITIONER

## 2023-08-24 PROCEDURE — 3074F SYST BP LT 130 MM HG: CPT | Mod: CPTII,,, | Performed by: NURSE PRACTITIONER

## 2023-08-24 PROCEDURE — 1159F MED LIST DOCD IN RCRD: CPT | Mod: CPTII,,, | Performed by: NURSE PRACTITIONER

## 2023-08-24 PROCEDURE — 3079F PR MOST RECENT DIASTOLIC BLOOD PRESSURE 80-89 MM HG: ICD-10-PCS | Mod: CPTII,,, | Performed by: NURSE PRACTITIONER

## 2023-08-24 PROCEDURE — 3008F BODY MASS INDEX DOCD: CPT | Mod: CPTII,,, | Performed by: NURSE PRACTITIONER

## 2023-08-24 PROCEDURE — 1160F RVW MEDS BY RX/DR IN RCRD: CPT | Mod: CPTII,,, | Performed by: NURSE PRACTITIONER

## 2023-08-24 PROCEDURE — 3079F DIAST BP 80-89 MM HG: CPT | Mod: CPTII,,, | Performed by: NURSE PRACTITIONER

## 2023-08-24 PROCEDURE — 1160F PR REVIEW ALL MEDS BY PRESCRIBER/CLIN PHARMACIST DOCUMENTED: ICD-10-PCS | Mod: CPTII,,, | Performed by: NURSE PRACTITIONER

## 2023-08-24 PROCEDURE — 99214 OFFICE O/P EST MOD 30 MIN: CPT | Performed by: NURSE PRACTITIONER

## 2023-08-24 PROCEDURE — 1159F PR MEDICATION LIST DOCUMENTED IN MEDICAL RECORD: ICD-10-PCS | Mod: CPTII,,, | Performed by: NURSE PRACTITIONER

## 2023-08-24 PROCEDURE — 3074F PR MOST RECENT SYSTOLIC BLOOD PRESSURE < 130 MM HG: ICD-10-PCS | Mod: CPTII,,, | Performed by: NURSE PRACTITIONER

## 2023-08-24 PROCEDURE — 99213 PR OFFICE/OUTPT VISIT, EST, LEVL III, 20-29 MIN: ICD-10-PCS | Mod: S$PBB,,, | Performed by: NURSE PRACTITIONER

## 2023-08-24 PROCEDURE — 3008F PR BODY MASS INDEX (BMI) DOCUMENTED: ICD-10-PCS | Mod: CPTII,,, | Performed by: NURSE PRACTITIONER

## 2023-08-24 RX ORDER — METHYLPREDNISOLONE 4 MG/1
TABLET ORAL
Qty: 1 EACH | Refills: 0 | Status: SHIPPED | OUTPATIENT
Start: 2023-08-24 | End: 2023-09-12

## 2023-08-24 NOTE — PROGRESS NOTES
Subjective:       Patient ID: Jane Garcia is a 32 y.o. female.    Chief Complaint: Cough    Headache   This is a recurrent problem. The current episode started 1 to 4 weeks ago. The problem occurs intermittently. The problem has been waxing and waning. The pain is located in the Frontal region. Radiates to: right side of neck. The quality of the pain is described as throbbing. The pain is moderate. Associated symptoms include coughing and muscle aches. Pertinent negatives include no abdominal pain, ear pain, eye pain, fever, nausea, photophobia, rhinorrhea, sore throat or vomiting. The symptoms are aggravated by fatigue. The treatment provided moderate relief.   Cough  This is a recurrent problem. The current episode started more than 1 month ago. The problem has been gradually improving. The problem occurs every few minutes. The cough is Non-productive. Associated symptoms include headaches, postnasal drip and shortness of breath. Pertinent negatives include no chest pain, chills, ear pain, fever, myalgias, nasal congestion, rash, rhinorrhea, sore throat or wheezing. The symptoms are aggravated by cold air and lying down. She has tried OTC cough suppressant and a beta-agonist inhaler for the symptoms. The treatment provided mild relief. There is no history of asthma, bronchiectasis, bronchitis, COPD, emphysema, environmental allergies or pneumonia.     Review of Systems   Constitutional:  Negative for activity change, appetite change, chills and fever.   HENT:  Positive for postnasal drip. Negative for congestion, ear discharge, ear pain, rhinorrhea, sore throat, trouble swallowing and voice change.    Eyes:  Negative for photophobia, pain, discharge and visual disturbance.   Respiratory:  Positive for cough and shortness of breath. Negative for chest tightness and wheezing.    Cardiovascular:  Negative for chest pain and palpitations.   Gastrointestinal:  Negative for abdominal pain, nausea and vomiting.    Endocrine: Negative for cold intolerance and heat intolerance.   Genitourinary:  Negative for difficulty urinating and dysuria.   Musculoskeletal:  Negative for arthralgias, gait problem and myalgias.   Skin:  Negative for rash.   Allergic/Immunologic: Negative for environmental allergies and immunocompromised state.   Neurological:  Positive for headaches. Negative for speech difficulty.   Psychiatric/Behavioral:  Negative for confusion, self-injury and suicidal ideas.        Past Medical History:   Diagnosis Date    Allergy     Anxiety       Past Surgical History:   Procedure Laterality Date    HERNIA REPAIR      LAPAROSCOPIC CHOLECYSTECTOMY N/A 5/23/2022    Procedure: CHOLECYSTECTOMY, LAPAROSCOPIC;  Surgeon: Garth Hurley III, MD;  Location: [facility];  Service: General;  Laterality: N/A;       Family History   Problem Relation Age of Onset    Hypertension Mother     Hypertension Father     Hypertension Maternal Aunt     Hypertension Maternal Uncle     No Known Problems Paternal Uncle     Breast cancer Maternal Grandmother     Cancer Maternal Grandmother     Hypertension Maternal Grandmother     Heart disease Maternal Grandfather        Social History     Socioeconomic History    Marital status:    Tobacco Use    Smoking status: Never    Smokeless tobacco: Never   Substance and Sexual Activity    Alcohol use: Not Currently    Drug use: No    Sexual activity: Yes     Partners: Male   Social History Narrative    ** Merged History Encounter **            Current Outpatient Medications   Medication Sig Dispense Refill    albuterol (PROVENTIL/VENTOLIN HFA) 90 mcg/actuation inhaler Inhale 2 puffs into the lungs every 6 (six) hours as needed for Wheezing. Rescue She states her mom gave her this inhaler 18 g 2    fluticasone propionate (FLONASE) 50 mcg/actuation nasal spray 2 sprays (100 mcg total) by Each Nostril route once daily. 15.8 mL 0    methocarbamoL (ROBAXIN) 500 MG Tab 500 mg 2 (two) times a day.    "   methylPREDNISolone (MEDROL DOSEPACK) 4 mg tablet use as directed 1 each 0     No current facility-administered medications for this visit.       Review of patient's allergies indicates:  No Known Allergies  Objective:      Blood pressure 110/80, pulse 99, temperature 98 °F (36.7 °C), resp. rate 18, height 5' 7" (1.702 m), weight 82.3 kg (181 lb 6.4 oz), last menstrual period 07/25/2023, SpO2 99 %. Body mass index is 28.41 kg/m².   Physical Exam  Vitals and nursing note reviewed.   Constitutional:       General: She is not in acute distress.     Appearance: Normal appearance. She is well-developed. She is not ill-appearing.   HENT:      Head: Normocephalic and atraumatic.      Right Ear: Ear canal and external ear normal. A middle ear effusion (mild) is present. Tympanic membrane is not bulging.      Left Ear: Ear canal and external ear normal.  No middle ear effusion. Tympanic membrane is not bulging.      Nose: No mucosal edema, congestion or rhinorrhea.      Right Sinus: No frontal sinus tenderness.      Left Sinus: No frontal sinus tenderness.      Mouth/Throat:      Mouth: Mucous membranes are moist.      Pharynx: Oropharyngeal exudate (clear  post nasal drainage) present. No pharyngeal swelling, posterior oropharyngeal erythema or uvula swelling.      Tonsils: No tonsillar exudate or tonsillar abscesses.   Eyes:      General: Lids are normal. Lids are everted, no foreign bodies appreciated.      Conjunctiva/sclera: Conjunctivae normal.      Pupils: Pupils are equal, round, and reactive to light.      Right eye: Pupil is round and reactive.      Left eye: Pupil is round and reactive.   Neck:      Trachea: Trachea normal.   Cardiovascular:      Rate and Rhythm: Regular rhythm.      Pulses: Normal pulses.      Heart sounds: Normal heart sounds, S1 normal and S2 normal.   Pulmonary:      Effort: Pulmonary effort is normal.      Breath sounds: Normal breath sounds. No stridor, decreased air movement or " transmitted upper airway sounds. No decreased breath sounds or wheezing.      Comments: Slight coarse breath sounds.  Abdominal:      General: Abdomen is flat. Bowel sounds are normal.      Palpations: Abdomen is soft. Abdomen is not rigid.      Tenderness: There is no guarding.   Musculoskeletal:         General: Normal range of motion.      Cervical back: Normal range of motion and neck supple. No muscular tenderness.   Lymphadenopathy:      Cervical: No cervical adenopathy.   Skin:     General: Skin is warm and dry.      Capillary Refill: Capillary refill takes less than 2 seconds.   Neurological:      General: No focal deficit present.      Mental Status: She is alert and oriented to person, place, and time.   Psychiatric:         Mood and Affect: Mood normal.         Behavior: Behavior normal. Behavior is cooperative.         Thought Content: Thought content normal.         Judgment: Judgment normal.             Assessment:       1. Acute cough    2. Wheezing        Plan:       Jane KULKARNI was seen today for cough.    Diagnoses and all orders for this visit:    Acute cough  -     methylPREDNISolone (MEDROL DOSEPACK) 4 mg tablet; use as directed    Wheezing  -     methylPREDNISolone (MEDROL DOSEPACK) 4 mg tablet; use as directed

## 2023-09-02 ENCOUNTER — HOSPITAL ENCOUNTER (EMERGENCY)
Facility: HOSPITAL | Age: 33
Discharge: HOME | End: 2023-09-02
Attending: EMERGENCY MEDICINE
Payer: MEDICAID

## 2023-09-02 VITALS
HEIGHT: 67 IN | RESPIRATION RATE: 16 BRPM | DIASTOLIC BLOOD PRESSURE: 97 MMHG | TEMPERATURE: 98 F | BODY MASS INDEX: 27.94 KG/M2 | WEIGHT: 178 LBS | SYSTOLIC BLOOD PRESSURE: 144 MMHG | OXYGEN SATURATION: 100 % | HEART RATE: 81 BPM

## 2023-09-02 DIAGNOSIS — N39.0 URINARY TRACT INFECTION WITH HEMATURIA, SITE UNSPECIFIED: Primary | ICD-10-CM

## 2023-09-02 DIAGNOSIS — R31.9 URINARY TRACT INFECTION WITH HEMATURIA, SITE UNSPECIFIED: Primary | ICD-10-CM

## 2023-09-02 LAB
B-HCG UR QL: NEGATIVE
BACTERIA #/AREA URNS HPF: NEGATIVE /HPF
BILIRUB UR QL STRIP: ABNORMAL
CLARITY UR: ABNORMAL
COLOR UR: ABNORMAL
CTP QC/QA: YES
GLUCOSE UR QL STRIP: ABNORMAL
HGB UR QL STRIP: ABNORMAL
HYALINE CASTS #/AREA URNS LPF: 15 /LPF
KETONES UR QL STRIP: ABNORMAL
LEUKOCYTE ESTERASE UR QL STRIP: ABNORMAL
MICROSCOPIC COMMENT: ABNORMAL
NITRITE UR QL STRIP: ABNORMAL
PH UR STRIP: 5 [PH] (ref 5–8)
PROT UR QL STRIP: ABNORMAL
RBC #/AREA URNS HPF: >100 /HPF (ref 0–4)
SP GR UR STRIP: >1.03 (ref 1–1.03)
SQUAMOUS #/AREA URNS HPF: 3 /HPF
URN SPEC COLLECT METH UR: ABNORMAL
UROBILINOGEN UR STRIP-ACNC: ABNORMAL EU/DL
WBC #/AREA URNS HPF: >100 /HPF (ref 0–5)
YEAST URNS QL MICRO: ABNORMAL

## 2023-09-02 PROCEDURE — 87086 URINE CULTURE/COLONY COUNT: CPT | Performed by: NURSE PRACTITIONER

## 2023-09-02 PROCEDURE — 81025 URINE PREGNANCY TEST: CPT | Performed by: NURSE PRACTITIONER

## 2023-09-02 PROCEDURE — 99284 EMERGENCY DEPT VISIT MOD MDM: CPT

## 2023-09-02 PROCEDURE — 81001 URINALYSIS AUTO W/SCOPE: CPT | Performed by: NURSE PRACTITIONER

## 2023-09-02 RX ORDER — PHENAZOPYRIDINE HYDROCHLORIDE 200 MG/1
200 TABLET, FILM COATED ORAL
Qty: 9 TABLET | Refills: 0 | Status: SHIPPED | OUTPATIENT
Start: 2023-09-02 | End: 2023-09-05

## 2023-09-02 RX ORDER — CEFUROXIME AXETIL 500 MG/1
500 TABLET ORAL EVERY 12 HOURS
Qty: 20 TABLET | Refills: 0 | Status: SHIPPED | OUTPATIENT
Start: 2023-09-02 | End: 2023-09-12

## 2023-09-02 NOTE — DISCHARGE INSTRUCTIONS
Discontinue taking the azo.  Take the medications I have given you as directed.  Follow up with the primary care doctor for follow up urine.  Return to the ED for any worsening of symptoms or any other concerns.

## 2023-09-02 NOTE — ED PROVIDER NOTES
Encounter Date: 9/2/2023       History     Chief Complaint   Patient presents with    Urinary Tract Infection     Presents with complaint of urinary frequency.  She also reports pressure and hematuria.  Denies fever nausea vomiting or diarrhea.  Onset 3 days.  Patient reports she is frequent urinary tract infections.  Patient has also taken azo.  Her urine is bright orange.      Review of patient's allergies indicates:  No Known Allergies  Past Medical History:   Diagnosis Date    Allergy     Anxiety      Past Surgical History:   Procedure Laterality Date    HERNIA REPAIR      LAPAROSCOPIC CHOLECYSTECTOMY N/A 5/23/2022    Procedure: CHOLECYSTECTOMY, LAPAROSCOPIC;  Surgeon: Garth Hurley III, MD;  Location: [facility];  Service: General;  Laterality: N/A;     Family History   Problem Relation Age of Onset    Hypertension Mother     Hypertension Father     Hypertension Maternal Aunt     Hypertension Maternal Uncle     No Known Problems Paternal Uncle     Breast cancer Maternal Grandmother     Cancer Maternal Grandmother     Hypertension Maternal Grandmother     Heart disease Maternal Grandfather      Social History     Tobacco Use    Smoking status: Never    Smokeless tobacco: Never   Substance Use Topics    Alcohol use: Not Currently    Drug use: No     Review of Systems   Constitutional:  Negative for fever.   Respiratory:  Negative for cough, shortness of breath and wheezing.    Cardiovascular:  Negative for chest pain, palpitations and leg swelling.   Gastrointestinal:  Negative for abdominal pain, diarrhea, nausea and vomiting.   Genitourinary:  Positive for frequency and hematuria. Negative for difficulty urinating, dysuria, flank pain and urgency.   Musculoskeletal:  Negative for back pain.   Skin:  Negative for rash.   Neurological:  Negative for weakness.       Physical Exam     Initial Vitals [09/02/23 0825]   BP Pulse Resp Temp SpO2   (!) 169/87 87 18 97.7 °F (36.5 °C) 100 %      MAP       --          Physical Exam    Constitutional: She appears well-developed and well-nourished.   HENT:   Head: Normocephalic.   Mouth/Throat: Oropharynx is clear and moist.   Eyes: Conjunctivae are normal.   Neck: Neck supple.   Normal range of motion.  Cardiovascular:  Normal rate and regular rhythm.           Pulmonary/Chest: Breath sounds normal. No respiratory distress.   Abdominal: Abdomen is soft. Bowel sounds are normal. She exhibits no distension. There is abdominal tenderness.   Mild tenderness suprapubic region.   Musculoskeletal:         General: Normal range of motion.      Cervical back: Normal range of motion and neck supple.      Comments: Patient is ambulatory per self.  She moves all extremities without difficulty.     Neurological: She is alert and oriented to person, place, and time. No sensory deficit. GCS score is 15. GCS eye subscore is 4. GCS verbal subscore is 5. GCS motor subscore is 6.   Skin: Skin is warm and dry. Capillary refill takes less than 2 seconds.   Psychiatric: She has a normal mood and affect. Thought content normal.         ED Course   Procedures  Labs Reviewed   URINALYSIS, REFLEX TO URINE CULTURE - Abnormal; Notable for the following components:       Result Value    Color, UA Orange (*)     Appearance, UA Cloudy (*)     Specific Gravity, UA >1.030 (*)     All other components within normal limits    Narrative:     Specimen Source->Urine   URINALYSIS MICROSCOPIC    Narrative:     Specimen Source->Urine   POCT URINE PREGNANCY          Imaging Results    None          Medications - No data to display  Medical Decision Making  Presents with complaint urinary frequency lower abdominal pressure and hematuria.  Denies fever nausea vomiting or diarrhea.  Onset 3 days.    Amount and/or Complexity of Data Reviewed  External Data Reviewed: labs.  Labs:      Details: Patient's urine was discolored by azo.  Discussion of management or test interpretation with external provider(s): Urine was  discolored by azo.  I have treated this patient with Ceftin 500 mg p.o. b.i.d. due to her symptoms and her history of frequent urinary tract infections.  I have also given her an ambulatory care referral to a urologist.  At no time while in the ED did she appear to be in any acute distress.  Patient has also asked for Pyridium.                               Clinical Impression:   Final diagnoses:  [N39.0, R31.9] Urinary tract infection with hematuria, site unspecified (Primary)        ED Disposition Condition    Discharge Stable          ED Prescriptions       Medication Sig Dispense Start Date End Date Auth. Provider    phenazopyridine (PYRIDIUM) 200 MG tablet Take 1 tablet (200 mg total) by mouth 3 (three) times daily with meals. for 3 days 9 tablet 9/2/2023 9/5/2023 Ruby Ferreira NP    cefUROXime (CEFTIN) 500 MG tablet Take 1 tablet (500 mg total) by mouth every 12 (twelve) hours. for 10 days 20 tablet 9/2/2023 9/12/2023 Ruby Ferreira NP          Follow-up Information       Follow up With Specialties Details Why Contact Info    Barbara Burks, [facility] In 5 days  [address] [facility] [address]  [phone]               Ruby Ferreira NP  09/02/23 2583

## 2023-09-04 LAB — BACTERIA UR CULT: NO GROWTH

## 2023-09-12 ENCOUNTER — HOSPITAL ENCOUNTER (OUTPATIENT)
Dept: RADIOLOGY | Facility: HOSPITAL | Age: 33
Discharge: HOME | End: 2023-09-12
Attending: NURSE PRACTITIONER
Payer: MEDICAID

## 2023-09-12 ENCOUNTER — OFFICE VISIT (OUTPATIENT)
Dept: FAMILY MEDICINE | Facility: CLINIC | Age: 33
End: 2023-09-12
Payer: MEDICAID

## 2023-09-12 VITALS
BODY MASS INDEX: 28.56 KG/M2 | TEMPERATURE: 98 F | OXYGEN SATURATION: 100 % | WEIGHT: 182 LBS | HEART RATE: 84 BPM | HEIGHT: 67 IN | SYSTOLIC BLOOD PRESSURE: 130 MMHG | DIASTOLIC BLOOD PRESSURE: 89 MMHG

## 2023-09-12 DIAGNOSIS — R05.1 ACUTE COUGH: Primary | ICD-10-CM

## 2023-09-12 DIAGNOSIS — R05.1 ACUTE COUGH: ICD-10-CM

## 2023-09-12 DIAGNOSIS — J45.41 MODERATE PERSISTENT REACTIVE AIRWAY DISEASE WITH ACUTE EXACERBATION: ICD-10-CM

## 2023-09-12 DIAGNOSIS — R30.9 URINARY PAIN: ICD-10-CM

## 2023-09-12 DIAGNOSIS — K21.9 GASTROESOPHAGEAL REFLUX DISEASE WITHOUT ESOPHAGITIS: ICD-10-CM

## 2023-09-12 DIAGNOSIS — B37.31 VULVOVAGINAL CANDIDIASIS: ICD-10-CM

## 2023-09-12 PROCEDURE — 1160F PR REVIEW ALL MEDS BY PRESCRIBER/CLIN PHARMACIST DOCUMENTED: ICD-10-PCS | Mod: CPTII,,, | Performed by: NURSE PRACTITIONER

## 2023-09-12 PROCEDURE — 1160F RVW MEDS BY RX/DR IN RCRD: CPT | Mod: CPTII,,, | Performed by: NURSE PRACTITIONER

## 2023-09-12 PROCEDURE — 71046 X-RAY EXAM CHEST 2 VIEWS: CPT | Mod: TC

## 2023-09-12 PROCEDURE — 99214 OFFICE O/P EST MOD 30 MIN: CPT | Mod: S$PBB,,, | Performed by: NURSE PRACTITIONER

## 2023-09-12 PROCEDURE — 1159F MED LIST DOCD IN RCRD: CPT | Mod: CPTII,,, | Performed by: NURSE PRACTITIONER

## 2023-09-12 PROCEDURE — 99214 OFFICE O/P EST MOD 30 MIN: CPT | Mod: 25 | Performed by: NURSE PRACTITIONER

## 2023-09-12 PROCEDURE — 3075F SYST BP GE 130 - 139MM HG: CPT | Mod: CPTII,,, | Performed by: NURSE PRACTITIONER

## 2023-09-12 PROCEDURE — 3008F PR BODY MASS INDEX (BMI) DOCUMENTED: ICD-10-PCS | Mod: CPTII,,, | Performed by: NURSE PRACTITIONER

## 2023-09-12 PROCEDURE — 3075F PR MOST RECENT SYSTOLIC BLOOD PRESS GE 130-139MM HG: ICD-10-PCS | Mod: CPTII,,, | Performed by: NURSE PRACTITIONER

## 2023-09-12 PROCEDURE — 99214 PR OFFICE/OUTPT VISIT, EST, LEVL IV, 30-39 MIN: ICD-10-PCS | Mod: S$PBB,,, | Performed by: NURSE PRACTITIONER

## 2023-09-12 PROCEDURE — 1159F PR MEDICATION LIST DOCUMENTED IN MEDICAL RECORD: ICD-10-PCS | Mod: CPTII,,, | Performed by: NURSE PRACTITIONER

## 2023-09-12 PROCEDURE — 3008F BODY MASS INDEX DOCD: CPT | Mod: CPTII,,, | Performed by: NURSE PRACTITIONER

## 2023-09-12 PROCEDURE — 3079F DIAST BP 80-89 MM HG: CPT | Mod: CPTII,,, | Performed by: NURSE PRACTITIONER

## 2023-09-12 PROCEDURE — 3079F PR MOST RECENT DIASTOLIC BLOOD PRESSURE 80-89 MM HG: ICD-10-PCS | Mod: CPTII,,, | Performed by: NURSE PRACTITIONER

## 2023-09-12 RX ORDER — PROMETHAZINE HYDROCHLORIDE AND DEXTROMETHORPHAN HYDROBROMIDE 6.25; 15 MG/5ML; MG/5ML
5 SYRUP ORAL NIGHTLY PRN
Qty: 118 ML | Refills: 0 | Status: SHIPPED | OUTPATIENT
Start: 2023-09-12 | End: 2023-09-17

## 2023-09-12 RX ORDER — FLUTICASONE PROPIONATE AND SALMETEROL 100; 50 UG/1; UG/1
1 POWDER RESPIRATORY (INHALATION) 2 TIMES DAILY
Qty: 60 EACH | Refills: 1 | Status: SHIPPED | OUTPATIENT
Start: 2023-09-12 | End: 2024-09-11

## 2023-09-12 RX ORDER — FLUCONAZOLE 150 MG/1
150 TABLET ORAL DAILY
Qty: 1 TABLET | Refills: 0 | Status: SHIPPED | OUTPATIENT
Start: 2023-09-12 | End: 2023-09-13

## 2023-09-12 RX ORDER — OMEPRAZOLE 20 MG/1
20 CAPSULE, DELAYED RELEASE ORAL DAILY
Qty: 30 CAPSULE | Refills: 1 | Status: SHIPPED | OUTPATIENT
Start: 2023-09-12 | End: 2024-09-11

## 2023-09-12 NOTE — PROGRESS NOTES
Subjective:       Patient ID: Jane Garcia is a 32 y.o. female.    Chief Complaint: Cough (X 2 months ) and Shortness of Breath (Chest tight )    Cough  This is a recurrent problem. The current episode started more than 1 month ago. The problem has been waxing and waning. The problem occurs every few minutes. The cough is Non-productive. Associated symptoms include chest pain, shortness of breath and wheezing. Pertinent negatives include no ear pain, fever, headaches, hemoptysis, rash, rhinorrhea or sore throat. The symptoms are aggravated by exercise and stress. She has tried body position changes, rest and a beta-agonist inhaler for the symptoms. The treatment provided moderate relief. There is no history of asthma, COPD or pneumonia.   Shortness of Breath  This is a chronic problem. The current episode started more than 1 month ago. The problem occurs constantly. The problem has been gradually worsening. The average episode lasts 5 minutes. Associated symptoms include chest pain, orthopnea, PND, sputum production, swollen glands, vomiting and wheezing. Pertinent negatives include no abdominal pain, claudication, coryza, ear pain, fever, headaches, hemoptysis, leg pain, leg swelling, neck pain, rash, rhinorrhea, sore throat or syncope. The symptoms are aggravated by any activity. The patient has no known risk factors for DVT/PE. She has tried OTC cough suppressants, beta agonist inhalers, cool air, oral steroids and rest for the symptoms. The treatment provided no relief. Her past medical history is significant for allergies. There is no history of aspirin allergies, asthma, bronchiolitis, CAD, chronic lung disease, COPD, DVT, a heart failure, PE, pneumonia or a recent surgery.   Gastroesophageal Reflux  She complains of chest pain, coughing, globus sensation and wheezing. She reports no abdominal pain, no nausea or no sore throat. This is a new problem. The current episode started more than 1 month ago. The  problem occurs constantly. The problem has been gradually worsening. The symptoms are aggravated by lying down. Pertinent negatives include no melena or muscle weakness. Risk factors include lack of exercise and caffeine use. She has tried nothing for the symptoms. The treatment provided no relief.     Review of Systems   Constitutional:  Positive for activity change. Negative for appetite change and fever.   HENT:  Negative for congestion, ear discharge, ear pain, rhinorrhea, sore throat, trouble swallowing and voice change.    Eyes:  Negative for photophobia, pain, discharge and visual disturbance.   Respiratory:  Positive for cough, sputum production, shortness of breath and wheezing. Negative for hemoptysis and chest tightness.    Cardiovascular:  Positive for chest pain, orthopnea and PND. Negative for palpitations, claudication, leg swelling and syncope.   Gastrointestinal:  Positive for vomiting. Negative for abdominal pain, melena and nausea.        Gerd   Endocrine: Negative for cold intolerance and heat intolerance.   Genitourinary:  Negative for difficulty urinating and dysuria.   Musculoskeletal:  Negative for arthralgias, gait problem, muscle weakness and neck pain.   Skin:  Negative for rash.   Allergic/Immunologic: Negative for immunocompromised state.   Neurological:  Negative for speech difficulty and headaches.   Psychiatric/Behavioral:  Negative for confusion, self-injury and suicidal ideas.        Past Medical History:   Diagnosis Date    Allergy     Anxiety       Past Surgical History:   Procedure Laterality Date    HERNIA REPAIR      LAPAROSCOPIC CHOLECYSTECTOMY N/A 5/23/2022    Procedure: CHOLECYSTECTOMY, LAPAROSCOPIC;  Surgeon: Garth Hurley III, MD;  Location: [facility];  Service: General;  Laterality: N/A;       Family History   Problem Relation Age of Onset    Hypertension Mother     Hypertension Father     Hypertension Maternal Aunt     Hypertension Maternal Uncle     No Known Problems  "Paternal Uncle     Breast cancer Maternal Grandmother     Cancer Maternal Grandmother     Hypertension Maternal Grandmother     Heart disease Maternal Grandfather        Social History     Socioeconomic History    Marital status:    Tobacco Use    Smoking status: Never    Smokeless tobacco: Never   Substance and Sexual Activity    Alcohol use: Not Currently    Drug use: No    Sexual activity: Yes     Partners: Male   Social History Narrative    ** Merged History Encounter **            Current Outpatient Medications   Medication Sig Dispense Refill    albuterol (PROVENTIL/VENTOLIN HFA) 90 mcg/actuation inhaler Inhale 2 puffs into the lungs every 6 (six) hours as needed for Wheezing. Rescue She states her mom gave her this inhaler 18 g 2    fluticasone propionate (FLONASE) 50 mcg/actuation nasal spray 2 sprays (100 mcg total) by Each Nostril route once daily. 15.8 mL 0    fluticasone-salmeterol diskus inhaler 100-50 mcg Inhale 1 puff into the lungs 2 (two) times daily. Controller 60 each 1    omeprazole (PRILOSEC) 20 MG capsule Take 1 capsule (20 mg total) by mouth once daily. 30 capsule 1     No current facility-administered medications for this visit.       Review of patient's allergies indicates:  No Known Allergies  Objective:    HPI     Cough     Additional comments: X 2 months            Shortness of Breath     Additional comments: Chest tight           Last edited by Janett Glynn MA on 9/12/2023 10:44 AM.      Blood pressure 130/89, pulse 84, temperature 98 °F (36.7 °C), height 5' 7" (1.702 m), weight 82.6 kg (182 lb), last menstrual period 08/21/2023, SpO2 100 %. Body mass index is 28.51 kg/m².   Physical Exam  Vitals and nursing note reviewed.   Constitutional:       General: She is not in acute distress.     Appearance: Normal appearance. She is well-developed. She is not ill-appearing.   HENT:      Head: Normocephalic and atraumatic.      Right Ear: Tympanic membrane, ear canal and external ear " normal.      Left Ear: Tympanic membrane, ear canal and external ear normal.      Nose: Rhinorrhea present.      Mouth/Throat:      Mouth: Mucous membranes are moist.      Pharynx: Oropharyngeal exudate (postnasal drip noted) present.   Eyes:      General: Lids are normal. Lids are everted, no foreign bodies appreciated.      Conjunctiva/sclera: Conjunctivae normal.      Pupils: Pupils are equal, round, and reactive to light.      Right eye: Pupil is round and reactive.      Left eye: Pupil is round and reactive.   Neck:      Trachea: Trachea normal.   Cardiovascular:      Rate and Rhythm: Normal rate and regular rhythm.      Pulses: Normal pulses.      Heart sounds: Normal heart sounds, S1 normal and S2 normal.   Pulmonary:      Effort: Pulmonary effort is normal. No respiratory distress.      Breath sounds: Normal breath sounds. No stridor. No wheezing or rhonchi.   Abdominal:      General: Abdomen is flat. Bowel sounds are normal.      Palpations: Abdomen is soft. Abdomen is not rigid.      Tenderness: There is no guarding.   Musculoskeletal:         General: Normal range of motion.      Cervical back: Normal range of motion and neck supple. No muscular tenderness.   Lymphadenopathy:      Cervical: No cervical adenopathy.   Skin:     General: Skin is warm and dry.      Capillary Refill: Capillary refill takes less than 2 seconds.   Neurological:      General: No focal deficit present.      Mental Status: She is alert and oriented to person, place, and time.   Psychiatric:         Mood and Affect: Mood normal.         Behavior: Behavior normal. Behavior is cooperative.         Thought Content: Thought content normal.         Judgment: Judgment normal.             Assessment:       1. Acute cough    2. Vulvovaginal candidiasis    3. Moderate persistent reactive airway disease with acute exacerbation    4. Gastroesophageal reflux disease without esophagitis    5. Urinary pain        Plan:       Jane KULKARNI was seen  today for cough and shortness of breath.    Diagnoses and all orders for this visit:    Acute cough  -     X-Ray Chest PA And Lateral; Future  -     promethazine-dextromethorphan (PROMETHAZINE-DM) 6.25-15 mg/5 mL Syrp; Take 5 mLs by mouth nightly as needed (cough).    Vulvovaginal candidiasis  -     fluconazole (DIFLUCAN) 150 MG Tab; Take 1 tablet (150 mg total) by mouth once daily. for 1 day    Moderate persistent reactive airway disease with acute exacerbation  -     fluticasone-salmeterol diskus inhaler 100-50 mcg; Inhale 1 puff into the lungs 2 (two) times daily. Controller    Gastroesophageal reflux disease without esophagitis  -     omeprazole (PRILOSEC) 20 MG capsule; Take 1 capsule (20 mg total) by mouth once daily.    Urinary pain  -     Ambulatory referral/consult to Urology; Future       Chest x-ray personally reviewed by me.  Medications sent to the pharmacy.  Start and try for 1 month.  Next would be a referral to pulmonology for further evaluation and management.

## 2023-10-23 ENCOUNTER — TELEPHONE (OUTPATIENT)
Dept: FAMILY MEDICINE | Facility: CLINIC | Age: 33
End: 2023-10-23

## 2023-10-23 DIAGNOSIS — R26.89 DECREASED FUNCTIONAL MOBILITY: Primary | ICD-10-CM

## 2023-10-23 DIAGNOSIS — R53.1 DECREASED STRENGTH: ICD-10-CM

## 2023-10-23 DIAGNOSIS — M54.50 ACUTE RIGHT-SIDED LOW BACK PAIN WITHOUT SCIATICA: ICD-10-CM

## 2023-10-23 NOTE — TELEPHONE ENCOUNTER
Pt called and asked if you would reinstate her orders for [facility]. For her back pain.  She had to stop going due to some transportation problems, but she is ready to start again.

## 2023-11-04 ENCOUNTER — OFFICE VISIT (OUTPATIENT)
Dept: URGENT CARE | Facility: CLINIC | Age: 33
End: 2023-11-04
Payer: MEDICAID

## 2023-11-04 VITALS
OXYGEN SATURATION: 100 % | WEIGHT: 182 LBS | TEMPERATURE: 99 F | HEART RATE: 81 BPM | HEIGHT: 67 IN | BODY MASS INDEX: 28.56 KG/M2 | SYSTOLIC BLOOD PRESSURE: 132 MMHG | DIASTOLIC BLOOD PRESSURE: 89 MMHG

## 2023-11-04 DIAGNOSIS — R89.4 INFLUENZA A VIRUS NOT DETECTED: ICD-10-CM

## 2023-11-04 DIAGNOSIS — J06.9 VIRAL URI WITH COUGH: Primary | ICD-10-CM

## 2023-11-04 DIAGNOSIS — R05.9 COUGH, UNSPECIFIED TYPE: ICD-10-CM

## 2023-11-04 DIAGNOSIS — Z20.822 COVID-19 VIRUS NOT DETECTED: ICD-10-CM

## 2023-11-04 LAB
CTP QC/QA: YES
CTP QC/QA: YES
FLUAV AG NPH QL: NEGATIVE
FLUBV AG NPH QL: NEGATIVE
SARS-COV-2 AG RESP QL IA.RAPID: NEGATIVE

## 2023-11-04 PROCEDURE — 87804 INFLUENZA ASSAY W/OPTIC: CPT | Mod: 59,QW,,

## 2023-11-04 PROCEDURE — 87811 SARS-COV-2 COVID19 W/OPTIC: CPT | Mod: QW,S$GLB,,

## 2023-11-04 PROCEDURE — 99214 PR OFFICE/OUTPT VISIT, EST, LEVL IV, 30-39 MIN: ICD-10-PCS | Mod: S$GLB,,,

## 2023-11-04 PROCEDURE — 87811 SARS CORONAVIRUS 2 ANTIGEN POCT, MANUAL READ: ICD-10-PCS | Mod: QW,S$GLB,,

## 2023-11-04 PROCEDURE — 99214 OFFICE O/P EST MOD 30 MIN: CPT | Mod: S$GLB,,,

## 2023-11-04 PROCEDURE — 87804 POCT INFLUENZA A/B: ICD-10-PCS | Mod: 59,QW,,

## 2023-11-04 RX ORDER — AZELASTINE 1 MG/ML
1 SPRAY, METERED NASAL 2 TIMES DAILY
Qty: 30 ML | Refills: 0 | Status: SHIPPED | OUTPATIENT
Start: 2023-11-04 | End: 2024-11-03

## 2023-11-04 RX ORDER — CETIRIZINE HYDROCHLORIDE 10 MG/1
10 TABLET ORAL DAILY
Qty: 30 TABLET | Refills: 0 | Status: SHIPPED | OUTPATIENT
Start: 2023-11-04 | End: 2023-12-04

## 2023-11-04 RX ORDER — PROMETHAZINE HYDROCHLORIDE AND DEXTROMETHORPHAN HYDROBROMIDE 6.25; 15 MG/5ML; MG/5ML
5 SYRUP ORAL NIGHTLY PRN
Qty: 50 ML | Refills: 0 | Status: SHIPPED | OUTPATIENT
Start: 2023-11-04 | End: 2023-11-14

## 2023-11-04 RX ORDER — BENZONATATE 100 MG/1
100 CAPSULE ORAL 3 TIMES DAILY PRN
Qty: 30 CAPSULE | Refills: 0 | Status: SHIPPED | OUTPATIENT
Start: 2023-11-04 | End: 2023-11-14

## 2023-11-04 NOTE — PATIENT INSTRUCTIONS
Phenergan cough syrup at nighttime use only.  Mucinex during the day, Delsym at nighttime but not with Phenergan cough syrup.  Flonase nasal spray over-the-counter.  Use an antihistamine such as the Zyrtec I sent in.  Increase fluids.  If your illness last longer than a total of 10 days, you develop a fever, rust or blood in your sputum (snot), or facial pains or swelling then please do not hesitate coming back to clinic, see primary care provider, or emergency room treatment.

## 2023-11-04 NOTE — PROGRESS NOTES
"Subjective:      Patient ID: Jane Garcia is a 32 y.o. female.    Vitals:  height is 5' 7" (1.702 m) and weight is 82.6 kg (182 lb). Her oral temperature is 98.7 °F (37.1 °C). Her blood pressure is 132/89 and her pulse is 81. Her oxygen saturation is 100%.     Chief Complaint: Sinus Problem    Ms. Malloy, in clinic for chief complaint of headache, nasal congestion, sore throat, and cough for 3 days.  She recently returned from cruise to the [facility].  Has been using Mucinex.    Follow-up  This is a new problem. The current episode started yesterday. The problem occurs constantly. Associated symptoms include congestion, coughing, fatigue, headaches, a sore throat and weakness. Pertinent negatives include no fever, myalgias, nausea, urinary symptoms or vomiting. The symptoms are aggravated by coughing. She has tried NSAIDs for the symptoms. The treatment provided no relief.       Constitution: Positive for fatigue. Negative for fever.   HENT:  Positive for congestion, postnasal drip and sore throat.    Neck: neck negative.   Cardiovascular: Negative.    Eyes: Negative.    Respiratory:  Positive for cough and sputum production.    Gastrointestinal:  Negative for nausea and vomiting.   Endocrine: negative.   Genitourinary: Negative.    Musculoskeletal: Negative.  Negative for muscle ache.   Skin: Negative.    Neurological:  Positive for headaches.   Hematologic/Lymphatic: Negative.    Psychiatric/Behavioral: Negative.        Objective:     Physical Exam   Constitutional: She is oriented to person, place, and time. She appears well-developed. She is cooperative.  Non-toxic appearance. She does not appear ill. No distress. obesity  HENT:   Head: Normocephalic and atraumatic.   Ears:   Right Ear: Hearing, tympanic membrane, external ear and ear canal normal.   Left Ear: Hearing, tympanic membrane, external ear and ear canal normal.   Nose: Congestion present. No mucosal edema or nasal deformity. No epistaxis. Right " sinus exhibits no maxillary sinus tenderness and no frontal sinus tenderness. Left sinus exhibits no maxillary sinus tenderness and no frontal sinus tenderness.   Mouth/Throat: Uvula is midline and mucous membranes are normal. Mucous membranes are moist. No trismus in the jaw. Normal dentition. No uvula swelling. Posterior oropharyngeal erythema present. Oropharynx is clear.   Eyes: Conjunctivae and lids are normal. Pupils are equal, round, and reactive to light. Extraocular movement intact   Neck: Trachea normal and phonation normal. Neck supple.   Cardiovascular: Normal rate, regular rhythm, normal heart sounds and normal pulses.   Pulmonary/Chest: Effort normal and breath sounds normal.   Abdominal: Normal appearance.   Musculoskeletal: Normal range of motion.         General: Normal range of motion.   Neurological: no focal deficit. She is alert, oriented to person, place, and time and at baseline. She exhibits normal muscle tone.   Skin: Skin is warm, dry, intact and not diaphoretic. Capillary refill takes 2 to 3 seconds.   Psychiatric: Her speech is normal and behavior is normal. Mood, judgment and thought content normal.   Nursing note and vitals reviewed.      Assessment:     1. Viral URI with cough    2. Cough, unspecified type    3. Influenza A virus not detected    4. COVID-19 virus not detected        Plan:       Viral URI with cough  -     cetirizine (ZYRTEC) 10 MG tablet; Take 1 tablet (10 mg total) by mouth once daily.  Dispense: 30 tablet; Refill: 0  -     benzonatate (TESSALON) 100 MG capsule; Take 1 capsule (100 mg total) by mouth 3 (three) times daily as needed for Cough.  Dispense: 30 capsule; Refill: 0  -     azelastine (ASTELIN) 137 mcg (0.1 %) nasal spray; 1 spray (137 mcg total) by Nasal route 2 (two) times daily.  Dispense: 30 mL; Refill: 0  -     promethazine-dextromethorphan (PROMETHAZINE-DM) 6.25-15 mg/5 mL Syrp; Take 5 mLs by mouth nightly as needed (night time cough).  Dispense: 50 mL;  Refill: 0    Cough, unspecified type  -     POCT Influenza A/B  -     SARS Coronavirus 2 Antigen, POCT Manual Read    Influenza A virus not detected    COVID-19 virus not detected

## 2023-11-07 ENCOUNTER — CLINICAL SUPPORT (OUTPATIENT)
Dept: REHABILITATION | Facility: HOSPITAL | Age: 33
End: 2023-11-07
Payer: MEDICAID

## 2023-11-07 DIAGNOSIS — R53.1 DECREASED STRENGTH: ICD-10-CM

## 2023-11-07 DIAGNOSIS — R26.89 DECREASED FUNCTIONAL MOBILITY: ICD-10-CM

## 2023-11-07 DIAGNOSIS — M54.50 ACUTE RIGHT-SIDED LOW BACK PAIN WITHOUT SCIATICA: ICD-10-CM

## 2023-11-07 DIAGNOSIS — M62.89 MUSCLE TIGHTNESS: Primary | ICD-10-CM

## 2023-11-07 PROCEDURE — 97110 THERAPEUTIC EXERCISES: CPT | Mod: PN

## 2023-11-07 PROCEDURE — 97162 PT EVAL MOD COMPLEX 30 MIN: CPT | Mod: PN

## 2023-11-07 NOTE — PROGRESS NOTES
[facility]  Physical Therapy Initial Evaluation    Name: Jane Garcia  Clinic Number: [id number]    Therapy Diagnosis:   Encounter Diagnoses   Name Primary?    Decreased functional mobility     Decreased strength     Acute right-sided low back pain without sciatica     Muscle tightness Yes      Physician: Barbara Burks FNP-C    Physician Orders: PT Eval and Treat  Medical Diagnosis from Referral:   R26.89 (ICD-10-CM) - Decreased functional mobility   R53.1 (ICD-10-CM) - Decreased strength   M54.50 (ICD-10-CM) - Acute right-sided low back pain without sciatica     Evaluation Date: 11/7/2023  Authorization Period Expiration: 12/31/23  Plan of Care Expiration: 12/31/2023    Progress Update: 12/10/2023    Visit # / Visits authorized: 1 / 12    FOTO: Visit #1 - 1/3     PRECAUTIONS: Standard Precautions     MD Follow-up: /2023    Time In: 1100  Time Out: 1200  Total Appointment Time (timed & untimed codes): 60 minutes    SUBJECTIVE     Date of onset: Months    History of current condition - Jane is a 32 y.o. female whom reports  Pain started a few month ago.  States that she was bending over and felt a sharp pain up her back to her neck.  States that pain did get better but comes back. Stays in the middle of her back .    States that she has had 4 kids.  Denies numbness/tingling, bowel bladder issues. She did a few visits of therapy with minimal benefit.  Previous treatments include Few Therapy visits .  Jane's current exercise regiment includes: none.  Seeking Physical Therapy for less back pain.    ERNESTINA: Bending over  Falls: none  Physician Instructions (per patient): none  Other concerns: none    Imaging: No updated imaging for this episode of care:     Prior Therapy: Yes  Social History: Pt lives with their family  Living Environment: 1 story   ADLs unable to complete: None  Gym/Home Equipment: none  Occupation: Pt is Stay at home  Prior Level of Function: Independent with all  ADLs    Pain:  Current 7 /10, worst 8 /10, best 7 /10   Location: Low back   Description: Aching  Aggravating Factors: Bending, holding kids  Easing Factors: medication    Pts goals: Pt reported goals are less back pain when caring for kids    _______________________________________________________  Medical History:   Past Medical History:   Diagnosis Date    Allergy     Anxiety        Surgical History:   Jane Garcia  has a past surgical history that includes Hernia repair and Laparoscopic cholecystectomy (N/A, 5/23/2022).    Medications:   Jane KULKARNI has a current medication list which includes the following prescription(s): albuterol, azelastine, benzonatate, cetirizine, fluticasone propionate, fluticasone-salmeterol 100-50 mcg/dose, omeprazole, promethazine-dextromethorphan, and [DISCONTINUED] loratadine.    Allergies:   Review of patient's allergies indicates:  No Known Allergies     OBJECTIVE     RANGE OF MOTION:      Lumbar AROM/PROM Right  (spine) Left   Pain/Dysfunction with Movement Goal   Lumbar Flexion (60) 50   60     Lumbar Extension (30) 25   30     Lumbar Side Bending (25) 20 20  25     Lumbar Rotation 5 5  Pain Free       Hip AROM/PROM Right   Left   Pain/Dysfunction with Movement Goal   Hip IR (45) 40 40  40     Hip ER (45) 45 45  40       NEURO SCREEN:    Neuro Testing Right   Left   Details   Reflexes  Not tested  Not tested     Dermatomes normal normal    Myotomes normal normal    Tone normal normal    Spasticity Not present Not present        FUNCTIONAL SCREEN:      Lower Extremity STRENGTH Details   Hip Grossly 4-/5 No pain or discomfort   Knee Grossly 4/5 No pain or discomfort   Foot/Ankle Grossly 5/5 No pain or discomfort     Abdominal Strength STRENGTH Details   Pelvic Tilt     Double Leg Drop     Plank Not tested         JOINT MOBILITY:     Joint Motion Tested Right  (spine)   Left    Goal               Thoracic Mobility: T1-12 Hypomobile Hypomobile Normal B   Lumbar Mobility: L1-5  Hypomobile Hypomobile Normal B     SPECIAL TESTS:     Right  (spine)   Left    Goal   SLR Negative Negative Negative B    Slump Negative Negative Negative B      Sensation:  Sensation is intact to light touch    Palpation: Increased tone and tenderness noted with palpation to: Lumbar paraspinals    Posture:  Pt presents with postural abnormalities which include: Increased lordosis    Gait Analysis: The patient ambulated with the following assistive device: none; the pt presents with the following gait abnormalities: decreased step length, lebron, and arm swing; increased forward flexed posture, trunk sway -     Movement Analysis:   Functional Test  Outcome   Double Leg Squat 1/2 depth   Single Leg Step Down Unable          Balance: Balance:    RIGHT   LEFT   Goal   Closed NT  60 seconds     Tandem   20 seconds     Single Leg 3 9 20 seconds         FUNCTION:     CMS Impairment/Limitation/Restriction for FOTO Oswestry Survey    Therapist reviewed FOTO scores for Jane Garcia on 11/7/2023.   FOTO documents entered into [employer] - see Media section.    Limitation Score: 52%         TREATMENT     Total Treatment time separate from Evaluation: (20) minutes    Jane received the treatments listed below:      THERAPEUTIC EXERCISES: to develop strength, endurance, ROM, flexibility, posture and core stabilization for (20)  minutes including: x = performed today    TherEx 11/7/2023    Lower trunk rotation with PhysioBall      Double Knee To Chest with PhysioBall      Hip ADD ball     Sidelying clams Red TheraBand       BOLD = new this visit  Plan for Next Visit:     Bike    Posterior pelvic tilt  Bridges   Prone hip extensions    Hamstring stretches  Piriformis stretches  PhysioBall rollout and to the left    Shuttle Kick Backs  Shuttle Bilateral   Palloff Press  Lumbar extension machine 50#        PATIENT EDUCATION AND HOME EXERCISES     Education/Self-Care provided:  (included in treatment) minutes   Patient educated on  the impairments noted above and the effects of physical therapy intervention to improve overall condition and QOL.   Patient was educated on all the above exercise prior/during/after for proper posture, positioning, and execution for safe performance with home exercise program.   Exercise/Activity modifications:   11/7/2023: EVAL:     Written Home Exercises Provided: yes. Prefers: Electronic Copies  Exercises were reviewed and Jane was able to demonstrate them prior to the end of the session.  Jane demonstrated good understanding of the education provided. See EMR under Patient Instructions for exercises provided during therapy sessions.    ASSESSMENT     Jane KULKARNI is a 32 y.o. female referred to outpatient Physical Therapy with a medical diagnosis of chronic low back pain.  Jane KULKARNI presents with clinical signs and symptoms that support this diagnosis with . decreased lumbar ROM, decreased lower extremity strength (4/5),  lower extremity neural tension, and impaired functional mobility. Radicular symptoms are present from the lumbar spine down into the forefoot of his left lower extremity.    The above impairments will be addressed through manual therapy techniques, therapeutic exercises, functional training, and modalities as necessary. Patient was treated and educated on exercises for home program, progression of therapy, and benefits of therapy to achieve full functional mobility.     Pt prognosis is Fair.   Pt will benefit from skilled outpatient Physical Therapy to address the deficits stated above and in the chart below, provide pt/family education, and to maximize pt's level of independence.     Plan of care discussed with patient: Yes  Pt's spiritual, cultural and educational needs considered and patient is agreeable to the plan of care and goals as stated below:     Anticipated Barriers for therapy: co-morbidities, sedentary lifestyle, and chronicity of condition  Medical Necessity is demonstrated  by the following  History  Co-morbidities and personal factors that may impact the plan of care [] LOW: no personal factors / co-morbidities  [x] MODERATE: 1-2 personal factors / co-morbidities  [] HIGH: 3+ personal factors / co-morbidities    Moderate / High Support Documentation:      Examination  Body Structures and Functions, activity limitations and participation restrictions that may impact the plan of care [] LOW: addressing 1-2 elements  [x] MODERATE: 3+ elements  [] HIGH: 4+ elements (please support below)    Moderate / High Support Documentation:      Clinical Presentation [] LOW: stable  [x] MODERATE: Evolving  [] HIGH: Unstable     Decision Making/ Complexity Score: moderate         GOALS:  SHORT TERM GOALS:  3 weeks  Progress Date met   Recent signs and systems trend is improving in order to progress towards LTG's. [] Met  [] Not Met  [x] Progressing     Patient will be independent with HEP in order to further progress and return to maximal function. [] Met  [] Not Met  [x] Progressing     Pain rating at Worst: 5/10 in order to progress towards increased independence with activity. [] Met  [] Not Met  [x] Progressing     Patient will be able to correct postural deviations in sitting and standing, to decrease pain and promote postural awareness for injury prevention.  [] Met  [] Not Met  [x] Progressing      [] Met  [] Not Met  [] Progressing        LONG TERM GOALS: 6 weeks  Progress Date met   Patient will return to normal ADL, recreational, and work related activities with less pain and limitation.  [] Met  [] Not Met  [x] Progressing     Patient will improve AROM to stated goals in order to return to maximal functional potential.  [] Met  [] Not Met  [x] Progressing     Patient will improve Strength to stated goals of appropriate musculature in order to improve functional independence.  [] Met  [] Not Met  [x] Progressing     Pain Rating at Best: 1/10 to improve Quality of Life.  [] Met  [] Not  Met  [x] Progressing     Patient will meet predicted functional outcome (FOTO) score: 34% to increase self-worth & perceived functional ability. [] Met  [] Not Met  [x] Progressing     Patient will have met/partially met personal goal of: Back to the gym without back pain [] Met  [] Not Met  [x] Progressing      [] Met  [] Not Met  [] Progressing        PLAN   Plan of care Certification: 11/7/2023 to 12/31/2023    Outpatient Physical Therapy 2 times weekly for 6 weeks to include any combination of the following interventions: virtual visits, dry needling, modalities, electrical stimulation (IFC, Pre-Mod, Attended with Functional Dry Needling), Cervical/Lumbar Traction, Manual Therapy, Moist Heat/ Ice, Neuromuscular Re-ed, Patient Education, Self Care, Therapeutic Activities, Therapeutic Exercise, Functional Training, and Therapeutic Activites     Thank you for this referral.    Lencho Nation, PT      I CERTIFY THE NEED FOR THESE SERVICES FURNISHED UNDER THIS PLAN OF TREATMENT AND WHILE UNDER MY CARE   Physician's comments:     Physician's Signature: ___________________________________________________

## 2023-12-04 PROBLEM — N39.0 URINARY TRACT INFECTION WITH HEMATURIA: Status: RESOLVED | Noted: 2023-09-02 | Resolved: 2023-12-04

## 2023-12-04 PROBLEM — R31.9 URINARY TRACT INFECTION WITH HEMATURIA: Status: RESOLVED | Noted: 2023-09-02 | Resolved: 2023-12-04

## 2024-01-29 LAB
HUMAN PAPILLOMAVIRUS (HPV): NORMAL
PAP RECOMMENDATION EXT: NORMAL
PAP SMEAR: NORMAL

## 2024-01-30 DIAGNOSIS — R06.02 SOB (SHORTNESS OF BREATH): ICD-10-CM

## 2024-01-31 RX ORDER — ALBUTEROL SULFATE 90 UG/1
2 AEROSOL, METERED RESPIRATORY (INHALATION) EVERY 6 HOURS PRN
Qty: 18 G | Refills: 2 | Status: SHIPPED | OUTPATIENT
Start: 2024-01-31 | End: 2024-05-07

## 2024-04-13 ENCOUNTER — OFFICE VISIT (OUTPATIENT)
Dept: URGENT CARE | Facility: CLINIC | Age: 34
End: 2024-04-13
Payer: MEDICAID

## 2024-04-13 VITALS
HEIGHT: 67 IN | HEART RATE: 82 BPM | DIASTOLIC BLOOD PRESSURE: 83 MMHG | SYSTOLIC BLOOD PRESSURE: 116 MMHG | OXYGEN SATURATION: 99 % | RESPIRATION RATE: 20 BRPM | WEIGHT: 181 LBS | TEMPERATURE: 98 F | BODY MASS INDEX: 28.41 KG/M2

## 2024-04-13 DIAGNOSIS — N39.0 URINARY TRACT INFECTION WITHOUT HEMATURIA, SITE UNSPECIFIED: Primary | ICD-10-CM

## 2024-04-13 LAB
BILIRUB UR QL STRIP: POSITIVE
GLUCOSE UR QL STRIP: NEGATIVE
KETONES UR QL STRIP: NEGATIVE
LEUKOCYTE ESTERASE UR QL STRIP: POSITIVE
PH, POC UA: 5.5
POC BLOOD, URINE: NEGATIVE
POC NITRATES, URINE: POSITIVE
PROT UR QL STRIP: POSITIVE
SP GR UR STRIP: 1.03 (ref 1–1.03)
UROBILINOGEN UR STRIP-ACNC: ABNORMAL (ref 0.1–1.1)

## 2024-04-13 PROCEDURE — 81003 URINALYSIS AUTO W/O SCOPE: CPT | Mod: QW,S$GLB,, | Performed by: NURSE PRACTITIONER

## 2024-04-13 PROCEDURE — 99214 OFFICE O/P EST MOD 30 MIN: CPT | Mod: S$GLB,,, | Performed by: NURSE PRACTITIONER

## 2024-04-13 RX ORDER — PHENAZOPYRIDINE HYDROCHLORIDE 200 MG/1
200 TABLET, FILM COATED ORAL 3 TIMES DAILY PRN
Qty: 15 TABLET | Refills: 0 | Status: SHIPPED | OUTPATIENT
Start: 2024-04-13 | End: 2024-04-18

## 2024-04-13 RX ORDER — NITROFURANTOIN 25; 75 MG/1; MG/1
100 CAPSULE ORAL 2 TIMES DAILY
Qty: 14 CAPSULE | Refills: 0 | Status: SHIPPED | OUTPATIENT
Start: 2024-04-13 | End: 2024-04-20

## 2024-04-13 NOTE — PROGRESS NOTES
"Subjective:      Patient ID: Jane Garcia is a 33 y.o. female.    Vitals:  height is 5' 7" (1.702 m) and weight is 82.1 kg (181 lb). Her oral temperature is 98.4 °F (36.9 °C). Her blood pressure is 116/83 and her pulse is 82. Her respiration is 20 and oxygen saturation is 99%.     Chief Complaint: Urinary Tract Infection    Urinary Tract Infection   This is a new problem. The current episode started yesterday. The problem has been unchanged. The quality of the pain is described as aching. The pain is at a severity of 7/10. The pain is mild. There has been no fever. Associated symptoms include urgency. The treatment provided no relief.       Genitourinary:  Positive for urgency.      Objective:     Physical Exam   Constitutional:  Non-toxic appearance. She does not appear ill. No distress.   HENT:   Head: Normocephalic.   Ears:   Right Ear: Tympanic membrane, external ear and ear canal normal.   Left Ear: Tympanic membrane, external ear and ear canal normal.   Nose: Nose normal.   Mouth/Throat: Mucous membranes are moist. Oropharynx is clear.   Eyes: Extraocular movement intact   Pulmonary/Chest: Effort normal and breath sounds normal.   Abdominal: Normal appearance. Soft.   Neurological: no focal deficit. She is alert.   Skin: Skin is warm and not diaphoretic. Capillary refill takes less than 2 seconds.   Nursing note and vitals reviewed.    Results for orders placed or performed in visit on 04/13/24   POCT Urinalysis, Dipstick, Automated, W/O Scope   Result Value Ref Range    POC Blood, Urine Negative Negative    POC Bilirubin, Urine Positive (A) Negative    POC Urobilinogen, Urine 1 mg/dl 0.1 - 1.1    POC Ketones, Urine Negative Negative    POC Protein, Urine Positive (A) Negative    POC Nitrates, Urine Positive (A) Negative    POC Glucose, Urine Negative Negative    pH, UA 5.5     POC Specific Gravity, Urine 1.030 (A) 1.003 - 1.029    POC Leukocytes, Urine Positive (A) Negative       Assessment:     1. " Urinary tract infection without hematuria, site unspecified        Plan:       Urinary tract infection without hematuria, site unspecified  -     POCT Urinalysis, Dipstick, Automated, W/O Scope  -     nitrofurantoin, macrocrystal-monohydrate, (MACROBID) 100 MG capsule; Take 1 capsule (100 mg total) by mouth 2 (two) times daily. for 7 days  Dispense: 14 capsule; Refill: 0  -     phenazopyridine (PYRIDIUM) 200 MG tablet; Take 1 tablet (200 mg total) by mouth 3 (three) times daily as needed for Pain.  Dispense: 15 tablet; Refill: 0                  Patient Instructions                                                                       UTI   If your condition worsens or fails to improve we recommend that you receive another evaluation at the ER immediately or contact your PCP to discuss your concerns or return here. You must understand that you've received an urgent care treatment only and that you may be released before all your medical problems are known or treated. You the patient will arrange for followup care as instructed.   If you were prescribed antibiotics, please take them to full completion.    If you had cultures done it will take 3-5 days to result. We will call you with the result.   If you are are female and on BCP use additional methods to prevent pregnancy while on the antibiotics and for one cycle after.   Cranberry juice may help. Get the 100% cranberry juice and mix 4 oz of juice with 4 oz of water and drink this 8 oz glass of liquid once a day.

## 2024-05-07 ENCOUNTER — OFFICE VISIT (OUTPATIENT)
Dept: FAMILY MEDICINE | Facility: CLINIC | Age: 34
End: 2024-05-07
Payer: MEDICAID

## 2024-05-07 VITALS
DIASTOLIC BLOOD PRESSURE: 80 MMHG | OXYGEN SATURATION: 99 % | WEIGHT: 178 LBS | TEMPERATURE: 99 F | SYSTOLIC BLOOD PRESSURE: 120 MMHG | HEIGHT: 67 IN | BODY MASS INDEX: 27.94 KG/M2 | HEART RATE: 78 BPM

## 2024-05-07 DIAGNOSIS — H66.001 NON-RECURRENT ACUTE SUPPURATIVE OTITIS MEDIA OF RIGHT EAR WITHOUT SPONTANEOUS RUPTURE OF TYMPANIC MEMBRANE: Primary | ICD-10-CM

## 2024-05-07 DIAGNOSIS — R09.82 POST-NASAL DRAINAGE: ICD-10-CM

## 2024-05-07 DIAGNOSIS — E66.3 OVERWEIGHT: ICD-10-CM

## 2024-05-07 PROCEDURE — 99213 OFFICE O/P EST LOW 20 MIN: CPT | Mod: S$PBB,,, | Performed by: NURSE PRACTITIONER

## 2024-05-07 PROCEDURE — 99999 PR PBB SHADOW E&M-EST. PATIENT-LVL III: CPT | Mod: PBBFAC,,, | Performed by: NURSE PRACTITIONER

## 2024-05-07 PROCEDURE — 99213 OFFICE O/P EST LOW 20 MIN: CPT | Mod: PBBFAC,PN | Performed by: NURSE PRACTITIONER

## 2024-05-07 PROCEDURE — 3008F BODY MASS INDEX DOCD: CPT | Mod: CPTII,,, | Performed by: NURSE PRACTITIONER

## 2024-05-07 PROCEDURE — 1160F RVW MEDS BY RX/DR IN RCRD: CPT | Mod: CPTII,,, | Performed by: NURSE PRACTITIONER

## 2024-05-07 PROCEDURE — 3079F DIAST BP 80-89 MM HG: CPT | Mod: CPTII,,, | Performed by: NURSE PRACTITIONER

## 2024-05-07 PROCEDURE — 1159F MED LIST DOCD IN RCRD: CPT | Mod: CPTII,,, | Performed by: NURSE PRACTITIONER

## 2024-05-07 PROCEDURE — 3074F SYST BP LT 130 MM HG: CPT | Mod: CPTII,,, | Performed by: NURSE PRACTITIONER

## 2024-05-07 RX ORDER — AMOXICILLIN AND CLAVULANATE POTASSIUM 875; 125 MG/1; MG/1
1 TABLET, FILM COATED ORAL 2 TIMES DAILY
Qty: 20 TABLET | Refills: 0 | Status: SHIPPED | OUTPATIENT
Start: 2024-05-07

## 2024-05-07 NOTE — PROGRESS NOTES
Subjective:       Patient ID: Jane Garcia is a 33 y.o. female.    Chief Complaint: right ear pain and Headache    Headache   This is a new problem. The current episode started in the past 7 days. The problem occurs daily. The problem has been gradually worsening. The pain is located in the Right unilateral region. The pain does not radiate. The pain quality is similar to prior headaches. The quality of the pain is described as throbbing. The pain is moderate. Associated symptoms include coughing, ear pain, rhinorrhea, sinus pressure and a sore throat. Pertinent negatives include no abdominal pain, drainage, eye pain, fever, hearing loss, nausea, neck pain, photophobia or vomiting. The symptoms are aggravated by coughing. She has tried acetaminophen for the symptoms. The treatment provided mild relief.   Otalgia   There is pain in the right ear. This is a new problem. The current episode started yesterday. The problem occurs hourly. The problem has been waxing and waning. There has been no fever. The pain is at a severity of 5/10. The pain is mild. Associated symptoms include coughing, headaches, rhinorrhea and a sore throat. Pertinent negatives include no abdominal pain, diarrhea, drainage, ear discharge, hearing loss, neck pain, rash or vomiting. She has tried ear drops for the symptoms. The treatment provided mild relief. There is no history of a chronic ear infection, hearing loss or a tympanostomy tube.     Review of Systems   Constitutional:  Positive for activity change and fatigue. Negative for appetite change and fever.   HENT:  Positive for ear pain, rhinorrhea, sinus pressure, sinus pain and sore throat. Negative for congestion, ear discharge, hearing loss, trouble swallowing and voice change.    Eyes:  Negative for photophobia, pain, discharge and visual disturbance.   Respiratory:  Positive for cough. Negative for chest tightness and shortness of breath.    Cardiovascular:  Negative for chest pain  and palpitations.   Gastrointestinal:  Negative for abdominal pain, diarrhea, nausea and vomiting.   Endocrine: Negative for cold intolerance and heat intolerance.   Genitourinary:  Negative for difficulty urinating and dysuria.   Musculoskeletal:  Negative for arthralgias, gait problem and neck pain.   Skin:  Negative for rash.   Allergic/Immunologic: Negative for immunocompromised state.   Neurological:  Positive for headaches. Negative for speech difficulty.   Psychiatric/Behavioral:  Negative for confusion, self-injury and suicidal ideas.        Past Medical History:   Diagnosis Date    Allergy     Anxiety       Past Surgical History:   Procedure Laterality Date    HERNIA REPAIR      LAPAROSCOPIC CHOLECYSTECTOMY N/A 5/23/2022    Procedure: CHOLECYSTECTOMY, LAPAROSCOPIC;  Surgeon: Garth Hurley III, MD;  Location: [facility];  Service: General;  Laterality: N/A;       Family History   Problem Relation Name Age of Onset    Hypertension Mother      Hypertension Father      Hypertension Maternal Aunt      Hypertension Maternal Uncle      No Known Problems Paternal Uncle      Breast cancer Maternal Grandmother      Cancer Maternal Grandmother      Hypertension Maternal Grandmother      Heart disease Maternal Grandfather         Social History     Socioeconomic History    Marital status:    Tobacco Use    Smoking status: Never    Smokeless tobacco: Never   Substance and Sexual Activity    Alcohol use: Not Currently    Drug use: No    Sexual activity: Yes     Partners: Male   Social History Narrative    ** Merged History Encounter **          Social Determinants of Health     Financial Resource Strain: Medium Risk (5/7/2024)    Overall Financial Resource Strain (CARDIA)     Difficulty of Paying Living Expenses: Somewhat hard   Food Insecurity: Food Insecurity Present (5/7/2024)    Hunger Vital Sign     Worried About Running Out of Food in the Last Year: Sometimes true     Ran Out of Food in the Last Year:  "Sometimes true   Transportation Needs: No Transportation Needs (5/7/2024)    PRAPARE - Transportation     Lack of Transportation (Medical): No     Lack of Transportation (Non-Medical): No   Physical Activity: Insufficiently Active (5/7/2024)    Exercise Vital Sign     Days of Exercise per Week: 3 days     Minutes of Exercise per Session: 30 min   Stress: Stress Concern Present (5/7/2024)    [employer] [place] [employer] - Occupational Stress Questionnaire     Feeling of Stress : Rather much   Housing Stability: High Risk (5/7/2024)    Housing Stability Vital Sign     Unable to Pay for Housing in the Last Year: Yes       Current Outpatient Medications   Medication Sig Dispense Refill    cetirizine (ZYRTEC) 10 MG tablet Take 1 tablet (10 mg total) by mouth once daily. 30 tablet 0    fluticasone propionate (FLONASE) 50 mcg/actuation nasal spray 2 sprays (100 mcg total) by Each Nostril route once daily. 15.8 mL 0    amoxicillin-clavulanate 875-125mg (AUGMENTIN) 875-125 mg per tablet Take 1 tablet by mouth 2 (two) times daily. 20 tablet 0     No current facility-administered medications for this visit.       Review of patient's allergies indicates:  No Known Allergies  Objective:      Blood pressure 120/80, pulse 78, temperature 98.6 °F (37 °C), height 5' 7" (1.702 m), weight 80.7 kg (178 lb), last menstrual period 04/15/2024, SpO2 99%. Body mass index is 27.88 kg/m².   Physical Exam  Vitals and nursing note reviewed.   Constitutional:       General: She is not in acute distress.     Appearance: Normal appearance. She is well-developed.   HENT:      Head: Normocephalic and atraumatic.      Right Ear: External ear normal. A middle ear effusion is present. Tympanic membrane is erythematous and bulging.      Left Ear: External ear normal. A middle ear effusion is present. Tympanic membrane is not erythematous or bulging.      Nose: Rhinorrhea present. No congestion.      Mouth/Throat:      Mouth: Mucous membranes " "are moist.      Pharynx: Oropharyngeal exudate and posterior oropharyngeal erythema (mild) present.   Eyes:      General: Lids are normal. Lids are everted, no foreign bodies appreciated.      Conjunctiva/sclera: Conjunctivae normal.      Pupils: Pupils are equal, round, and reactive to light.      Right eye: Pupil is round and reactive.      Left eye: Pupil is round and reactive.   Neck:      Trachea: Trachea normal.   Cardiovascular:      Rate and Rhythm: Normal rate and regular rhythm.      Pulses: Normal pulses.      Heart sounds: Normal heart sounds, S1 normal and S2 normal.   Pulmonary:      Effort: Pulmonary effort is normal. No respiratory distress.      Breath sounds: Normal breath sounds. No wheezing.   Abdominal:      General: Abdomen is flat. Bowel sounds are normal.      Palpations: Abdomen is soft. Abdomen is not rigid.      Tenderness: There is no guarding.   Musculoskeletal:         General: Normal range of motion.      Cervical back: Normal range of motion and neck supple. No muscular tenderness.   Lymphadenopathy:      Cervical: No cervical adenopathy.   Skin:     General: Skin is warm and dry.      Capillary Refill: Capillary refill takes less than 2 seconds.   Neurological:      General: No focal deficit present.      Mental Status: She is alert and oriented to person, place, and time.   Psychiatric:         Mood and Affect: Mood normal.         Behavior: Behavior normal. Behavior is cooperative.         Thought Content: Thought content normal.         Judgment: Judgment normal.             Assessment:       1. Non-recurrent acute suppurative otitis media of right ear without spontaneous rupture of tympanic membrane    2. Post-nasal drainage    3. Overweight        Plan:       Jane Hernández" was seen today for right ear pain and headache.    Diagnoses and all orders for this visit:    Non-recurrent acute suppurative otitis media of right ear without spontaneous rupture of tympanic " membrane  -     amoxicillin-clavulanate 875-125mg (AUGMENTIN) 875-125 mg per tablet; Take 1 tablet by mouth 2 (two) times daily.    Post-nasal drainage  Increase flonase to twice per day use    Overweight  The patient is asked to make an attempt to improve diet and exercise patterns to aid in medical management of this problem.      Follow up if not improving in 1-2 weeks

## 2024-07-09 ENCOUNTER — OFFICE VISIT (OUTPATIENT)
Dept: FAMILY MEDICINE | Facility: CLINIC | Age: 34
End: 2024-07-09
Payer: MEDICAID

## 2024-07-09 VITALS
WEIGHT: 180 LBS | SYSTOLIC BLOOD PRESSURE: 120 MMHG | TEMPERATURE: 99 F | DIASTOLIC BLOOD PRESSURE: 80 MMHG | HEIGHT: 67 IN | HEART RATE: 80 BPM | BODY MASS INDEX: 28.25 KG/M2 | OXYGEN SATURATION: 99 %

## 2024-07-09 DIAGNOSIS — Z13.0 SCREENING FOR DEFICIENCY ANEMIA: ICD-10-CM

## 2024-07-09 DIAGNOSIS — G89.29 CHRONIC LEFT-SIDED LOW BACK PAIN WITH LEFT-SIDED SCIATICA: ICD-10-CM

## 2024-07-09 DIAGNOSIS — M54.6 ACUTE RIGHT-SIDED THORACIC BACK PAIN: Primary | ICD-10-CM

## 2024-07-09 DIAGNOSIS — M54.42 CHRONIC LEFT-SIDED LOW BACK PAIN WITH LEFT-SIDED SCIATICA: ICD-10-CM

## 2024-07-09 DIAGNOSIS — Z13.1 DIABETES MELLITUS SCREENING: ICD-10-CM

## 2024-07-09 DIAGNOSIS — Z13.220 LIPID SCREENING: ICD-10-CM

## 2024-07-09 PROCEDURE — 99213 OFFICE O/P EST LOW 20 MIN: CPT | Mod: S$PBB,,, | Performed by: NURSE PRACTITIONER

## 2024-07-09 PROCEDURE — 3074F SYST BP LT 130 MM HG: CPT | Mod: CPTII,,, | Performed by: NURSE PRACTITIONER

## 2024-07-09 PROCEDURE — 3008F BODY MASS INDEX DOCD: CPT | Mod: CPTII,,, | Performed by: NURSE PRACTITIONER

## 2024-07-09 PROCEDURE — 3079F DIAST BP 80-89 MM HG: CPT | Mod: CPTII,,, | Performed by: NURSE PRACTITIONER

## 2024-07-09 PROCEDURE — 1160F RVW MEDS BY RX/DR IN RCRD: CPT | Mod: CPTII,,, | Performed by: NURSE PRACTITIONER

## 2024-07-09 PROCEDURE — 1159F MED LIST DOCD IN RCRD: CPT | Mod: CPTII,,, | Performed by: NURSE PRACTITIONER

## 2024-07-09 PROCEDURE — 99999 PR PBB SHADOW E&M-EST. PATIENT-LVL III: CPT | Mod: PBBFAC,,, | Performed by: NURSE PRACTITIONER

## 2024-07-09 PROCEDURE — 99213 OFFICE O/P EST LOW 20 MIN: CPT | Mod: PBBFAC,PN | Performed by: NURSE PRACTITIONER

## 2024-07-09 NOTE — PROGRESS NOTES
Subjective:       Patient ID: Jane Garcia is a 33 y.o. female.    Chief Complaint: Mid-back Pain    Back Pain  This is a new problem. The current episode started 1 to 4 weeks ago. The problem occurs daily. The problem has been waxing and waning since onset. The pain is present in the costovertebral angle. The quality of the pain is described as aching. The pain does not radiate. The pain is at a severity of 8/10. The pain is moderate. The pain is The same all the time. The symptoms are aggravated by position and twisting. Stiffness is present All day. Pertinent negatives include no abdominal pain, bladder incontinence, bowel incontinence, chest pain, dysuria, fever, headaches, leg pain, numbness, paresis, paresthesias, pelvic pain, perianal numbness, tingling, weakness or weight loss. Risk factors include poor posture. She has tried heat, walking and home exercises for the symptoms. The treatment provided mild relief.     Review of Systems   Constitutional:  Positive for activity change. Negative for appetite change, fever and weight loss.   HENT:  Negative for congestion, ear discharge, ear pain, sore throat, trouble swallowing and voice change.    Eyes:  Negative for photophobia, pain, discharge and visual disturbance.   Respiratory:  Negative for cough, chest tightness and shortness of breath.    Cardiovascular:  Negative for chest pain and palpitations.   Gastrointestinal:  Negative for abdominal pain, bowel incontinence, nausea and vomiting.   Endocrine: Negative for cold intolerance and heat intolerance.   Genitourinary:  Negative for bladder incontinence, difficulty urinating, dysuria, hematuria and pelvic pain.   Musculoskeletal:  Positive for back pain. Negative for arthralgias and gait problem.   Skin:  Negative for rash.   Allergic/Immunologic: Negative for immunocompromised state.   Neurological:  Negative for tingling, speech difficulty, weakness, numbness, headaches and paresthesias.    Psychiatric/Behavioral:  Negative for confusion, self-injury and suicidal ideas.        Past Medical History:   Diagnosis Date    Allergy     Anxiety       Past Surgical History:   Procedure Laterality Date    HERNIA REPAIR      LAPAROSCOPIC CHOLECYSTECTOMY N/A 5/23/2022    Procedure: CHOLECYSTECTOMY, LAPAROSCOPIC;  Surgeon: Garth Hurley III, MD;  Location: [facility];  Service: General;  Laterality: N/A;       Family History   Problem Relation Name Age of Onset    Hypertension Mother      Hypertension Father      Hypertension Maternal Aunt      Hypertension Maternal Uncle      No Known Problems Paternal Uncle      Breast cancer Maternal Grandmother      Cancer Maternal Grandmother      Hypertension Maternal Grandmother      Heart disease Maternal Grandfather         Social History     Socioeconomic History    Marital status:    Tobacco Use    Smoking status: Never    Smokeless tobacco: Never   Substance and Sexual Activity    Alcohol use: Not Currently    Drug use: No    Sexual activity: Yes     Partners: Male   Social History Narrative    ** Merged History Encounter **          Social Determinants of Health     Financial Resource Strain: Medium Risk (7/9/2024)    Overall Financial Resource Strain (CARDIA)     Difficulty of Paying Living Expenses: Somewhat hard   Food Insecurity: No Food Insecurity (7/9/2024)    Hunger Vital Sign     Worried About Running Out of Food in the Last Year: Never true     Ran Out of Food in the Last Year: Never true   Recent Concern: Food Insecurity - Food Insecurity Present (5/7/2024)    Hunger Vital Sign     Worried About Running Out of Food in the Last Year: Sometimes true     Ran Out of Food in the Last Year: Sometimes true   Transportation Needs: No Transportation Needs (5/7/2024)    PRAPARE - Transportation     Lack of Transportation (Medical): No     Lack of Transportation (Non-Medical): No   Physical Activity: Sufficiently Active (7/9/2024)    Exercise Vital Sign      "Days of Exercise per Week: 3 days     Minutes of Exercise per Session: 60 min   Recent Concern: Physical Activity - Insufficiently Active (5/7/2024)    Exercise Vital Sign     Days of Exercise per Week: 3 days     Minutes of Exercise per Session: 30 min   Stress: Stress Concern Present (7/9/2024)    [employer] - Occupational Stress Questionnaire     Feeling of Stress : To some extent   Housing Stability: High Risk (7/9/2024)    Housing Stability Vital Sign     Unable to Pay for Housing in the Last Year: Yes       Current Outpatient Medications   Medication Sig Dispense Refill    cetirizine (ZYRTEC) 10 MG tablet Take 1 tablet (10 mg total) by mouth once daily. 30 tablet 0    fluticasone propionate (FLONASE) 50 mcg/actuation nasal spray 2 sprays (100 mcg total) by Each Nostril route once daily. 15.8 mL 0     No current facility-administered medications for this visit.       Review of patient's allergies indicates:  No Known Allergies  Objective:      Blood pressure 120/80, pulse 80, temperature 98.5 °F (36.9 °C), height 5' 7" (1.702 m), weight 81.6 kg (180 lb), SpO2 99%. Body mass index is 28.19 kg/m².   Physical Exam  Vitals and nursing note reviewed.   Constitutional:       General: She is not in acute distress.     Appearance: Normal appearance. She is well-developed.   HENT:      Head: Normocephalic and atraumatic.      Right Ear: Tympanic membrane normal.      Left Ear: Tympanic membrane normal.      Nose: Nose normal.      Mouth/Throat:      Mouth: Mucous membranes are moist.   Eyes:      General: Lids are normal. Lids are everted, no foreign bodies appreciated.      Conjunctiva/sclera: Conjunctivae normal.      Pupils: Pupils are equal, round, and reactive to light.      Right eye: Pupil is round and reactive.      Left eye: Pupil is round and reactive.   Neck:      Trachea: Trachea normal.   Cardiovascular:      Rate and Rhythm: Normal rate and regular rhythm.      Pulses: Normal " pulses.      Heart sounds: Normal heart sounds, S1 normal and S2 normal.   Pulmonary:      Effort: Pulmonary effort is normal.      Breath sounds: Normal breath sounds.   Abdominal:      Palpations: Abdomen is not rigid.      Tenderness: There is no abdominal tenderness. There is no right CVA tenderness or left CVA tenderness.   Musculoskeletal:      Cervical back: Normal range of motion and neck supple. No muscular tenderness.      Thoracic back: Tenderness present. Decreased range of motion.        Back:    Lymphadenopathy:      Cervical: No cervical adenopathy.   Skin:     General: Skin is warm and dry.      Capillary Refill: Capillary refill takes less than 2 seconds.   Neurological:      General: No focal deficit present.      Mental Status: She is alert and oriented to person, place, and time.   Psychiatric:         Mood and Affect: Mood normal.         Behavior: Behavior normal. Behavior is cooperative.         Thought Content: Thought content normal.         Judgment: Judgment normal.             Assessment:       1. Acute right-sided thoracic back pain    2. Chronic left-sided low back pain with left-sided sciatica    3. Screening for deficiency anemia    4. Diabetes mellitus screening    5. Lipid screening        Plan:       Jane was seen today for mid-back pain.    Diagnoses and all orders for this visit:    Acute right-sided thoracic back pain  -     Ambulatory referral/consult to Physical/Occupational Therapy; Future    Chronic left-sided low back pain with left-sided sciatica  -     Ambulatory referral/consult to Physical/Occupational Therapy; Future    Screening for deficiency anemia  -     CBC Auto Differential; Future  -     CBC Auto Differential    Diabetes mellitus screening  -     Comprehensive Metabolic Panel; Future  -     Comprehensive Metabolic Panel    Lipid screening  -     Lipid Panel; Future  -     Lipid Panel

## 2024-07-23 ENCOUNTER — OFFICE VISIT (OUTPATIENT)
Dept: URGENT CARE | Facility: CLINIC | Age: 34
End: 2024-07-23
Payer: MEDICAID

## 2024-07-23 VITALS
RESPIRATION RATE: 16 BRPM | OXYGEN SATURATION: 99 % | DIASTOLIC BLOOD PRESSURE: 89 MMHG | BODY MASS INDEX: 28.25 KG/M2 | TEMPERATURE: 98 F | HEIGHT: 67 IN | HEART RATE: 88 BPM | SYSTOLIC BLOOD PRESSURE: 130 MMHG | WEIGHT: 180 LBS

## 2024-07-23 DIAGNOSIS — H65.93 FLUID LEVEL BEHIND TYMPANIC MEMBRANE OF BOTH EARS: ICD-10-CM

## 2024-07-23 DIAGNOSIS — H66.91 RIGHT OTITIS MEDIA, UNSPECIFIED OTITIS MEDIA TYPE: Primary | ICD-10-CM

## 2024-07-23 PROCEDURE — 99214 OFFICE O/P EST MOD 30 MIN: CPT | Mod: S$GLB,,,

## 2024-07-23 RX ORDER — CETIRIZINE HYDROCHLORIDE 10 MG/1
10 TABLET ORAL DAILY
Qty: 30 TABLET | Refills: 0 | Status: SHIPPED | OUTPATIENT
Start: 2024-07-23

## 2024-07-23 RX ORDER — AMOXICILLIN AND CLAVULANATE POTASSIUM 875; 125 MG/1; MG/1
1 TABLET, FILM COATED ORAL EVERY 12 HOURS
Qty: 10 TABLET | Refills: 0 | Status: SHIPPED | OUTPATIENT
Start: 2024-07-23 | End: 2024-07-28

## 2024-07-23 NOTE — PATIENT INSTRUCTIONS
Oral antibiotics as prescribed.      Increase hydration with small frequent sips of fluids    Ibuprofen/Tylenol as directed for fever, sore throat, body aches    Zrytec and flonase for sinus symptoms    Warm, salt water gargles, over the counter throat lozenges or sprays as desires.     ER for difficulty breathing not relieved by rest, excessive lethargy and/or change in mental status.

## 2024-07-23 NOTE — PROGRESS NOTES
"Subjective:      Patient ID: Jane Garcia is a 33 y.o. female.    Vitals:  height is 5' 7" (1.702 m) and weight is 81.6 kg (180 lb). Her temperature is 98 °F (36.7 °C). Her blood pressure is 130/89 and her pulse is 88. Her respiration is 16 and oxygen saturation is 99%.     Chief Complaint: Otalgia    Patient complains of right ear pain x 3 days. Patient states pain is level 8/10. Denies recent water exposure. No congestion noted, no fever noted.     Constitution: Negative for chills, fatigue and fever.   HENT:  Positive for ear pain. Negative for ear discharge, hearing loss and congestion.    Neck: neck negative.   Cardiovascular: Negative.    Respiratory: Negative.     Gastrointestinal: Negative.    Musculoskeletal: Negative.    Neurological: Negative.  Negative for dizziness and headaches.   Psychiatric/Behavioral: Negative.        Objective:     Physical Exam   Constitutional: She is oriented to person, place, and time. She does not appear ill. No distress. normal  HENT:   Head: Normocephalic and atraumatic.   Ears:   Right Ear: External ear and ear canal normal. There is tenderness. Tympanic membrane is erythematous and retracted. no impacted cerumen  Left Ear: External ear and ear canal normal. A middle ear effusion is present. no impacted cerumen  Nose: Nose normal.   Mouth/Throat: Mucous membranes are moist. Oropharynx is clear.   Eyes: Conjunctivae are normal.   Neck: No neck rigidity present.   Cardiovascular: Normal rate.   Pulmonary/Chest: Effort normal. No respiratory distress.   Abdominal: Normal appearance. Soft. There is no abdominal tenderness.   Musculoskeletal: Normal range of motion.         General: No swelling or tenderness. Normal range of motion.      Cervical back: She exhibits no tenderness.   Neurological: She is alert and oriented to person, place, and time. She displays no weakness.   Skin: Skin is warm and dry. Capillary refill takes less than 2 seconds.   Psychiatric: Her " behavior is normal. Mood, judgment and thought content normal.   Nursing note and vitals reviewed.      Assessment:     1. Right otitis media, unspecified otitis media type    2. Fluid level behind tympanic membrane of both ears        Plan:       Right otitis media, unspecified otitis media type  -     amoxicillin-clavulanate 875-125mg (AUGMENTIN) 875-125 mg per tablet; Take 1 tablet by mouth every 12 (twelve) hours. for 5 days  Dispense: 10 tablet; Refill: 0    Fluid level behind tympanic membrane of both ears  -     cetirizine (ZYRTEC) 10 MG tablet; Take 1 tablet (10 mg total) by mouth once daily.  Dispense: 30 tablet; Refill: 0      Discussed medication with patient who acknowledges understanding and is agreeable to POC. Follow up with primary care. Increase fluid intake. Red flags for ER discussed.

## 2024-07-29 ENCOUNTER — OFFICE VISIT (OUTPATIENT)
Dept: FAMILY MEDICINE | Facility: CLINIC | Age: 34
End: 2024-07-29
Payer: MEDICAID

## 2024-07-29 VITALS
TEMPERATURE: 98 F | BODY MASS INDEX: 28.49 KG/M2 | RESPIRATION RATE: 20 BRPM | SYSTOLIC BLOOD PRESSURE: 128 MMHG | HEART RATE: 92 BPM | HEIGHT: 67 IN | DIASTOLIC BLOOD PRESSURE: 80 MMHG | WEIGHT: 181.5 LBS

## 2024-07-29 DIAGNOSIS — H65.91 RIGHT OTITIS MEDIA WITH EFFUSION: Primary | ICD-10-CM

## 2024-07-29 DIAGNOSIS — H65.191 ACUTE EFFUSION OF RIGHT EAR: ICD-10-CM

## 2024-07-29 PROCEDURE — 99213 OFFICE O/P EST LOW 20 MIN: CPT | Mod: S$PBB,,, | Performed by: NURSE PRACTITIONER

## 2024-07-29 PROCEDURE — 3079F DIAST BP 80-89 MM HG: CPT | Mod: CPTII,,, | Performed by: NURSE PRACTITIONER

## 2024-07-29 PROCEDURE — 99213 OFFICE O/P EST LOW 20 MIN: CPT | Mod: PBBFAC,PN | Performed by: NURSE PRACTITIONER

## 2024-07-29 PROCEDURE — 3008F BODY MASS INDEX DOCD: CPT | Mod: CPTII,,, | Performed by: NURSE PRACTITIONER

## 2024-07-29 PROCEDURE — 3074F SYST BP LT 130 MM HG: CPT | Mod: CPTII,,, | Performed by: NURSE PRACTITIONER

## 2024-07-29 PROCEDURE — 1159F MED LIST DOCD IN RCRD: CPT | Mod: CPTII,,, | Performed by: NURSE PRACTITIONER

## 2024-07-29 PROCEDURE — 99999 PR PBB SHADOW E&M-EST. PATIENT-LVL III: CPT | Mod: PBBFAC,,, | Performed by: NURSE PRACTITIONER

## 2024-07-29 RX ORDER — CEFDINIR 300 MG/1
300 CAPSULE ORAL 2 TIMES DAILY
Qty: 20 CAPSULE | Refills: 0 | Status: SHIPPED | OUTPATIENT
Start: 2024-07-29 | End: 2024-08-08

## 2024-07-29 RX ORDER — MECLIZINE HYDROCHLORIDE 25 MG/1
25 TABLET ORAL 3 TIMES DAILY PRN
Qty: 30 TABLET | Refills: 0 | Status: SHIPPED | OUTPATIENT
Start: 2024-07-29

## 2024-07-29 NOTE — PROGRESS NOTES
Patient ID: Jane Garcia is a 33 y.o. female.    Chief Complaint: Otalgia (32 yo female here stating concern of right ear pain. Pt states she was eval/tx at Urgent Care times 2 weeks ago due to explained symptoms and tx with Amoxil. No report of fever or chills but constant sniffles with post nasal times 9 months. KM)    Ms. Garcia presents today for evaluation of ear pain and tenderness that has been intermittent for several months. She was recently treated with Augmentin that was completed on yesterday and she states the pain is worsening. She denies any changes to hearing or bleeding from ears. She denies any other issues or complaints.     Otalgia   There is pain in the right ear. This is a recurrent problem. The current episode started in the past 7 days. The problem occurs every few hours. The problem has been rapidly worsening. There has been no fever. The pain is at a severity of 9/10. The pain is moderate. Pertinent negatives include no abdominal pain, coughing, diarrhea, drainage, ear discharge, headaches, hearing loss, neck pain, rash, rhinorrhea, sore throat or vomiting. She has tried acetaminophen and antibiotics for the symptoms. The treatment provided no relief. There is no history of a chronic ear infection, hearing loss or a tympanostomy tube.           Past Medical History:   Diagnosis Date    Allergy     Anxiety      Past Surgical History:   Procedure Laterality Date    HERNIA REPAIR      LAPAROSCOPIC CHOLECYSTECTOMY N/A 5/23/2022    Procedure: CHOLECYSTECTOMY, LAPAROSCOPIC;  Surgeon: Garth Hurley III, MD;  Location: [facility];  Service: General;  Laterality: N/A;         Tobacco History:  reports that she has never smoked. She has never been exposed to tobacco smoke. She has never used smokeless tobacco.      Review of patient's allergies indicates:  No Known Allergies    Current Outpatient Medications:     cetirizine (ZYRTEC) 10 MG tablet, Take 1 tablet (10 mg total) by mouth once  "daily., Disp: 30 tablet, Rfl: 0    fluticasone propionate (FLONASE) 50 mcg/actuation nasal spray, 2 sprays (100 mcg total) by Each Nostril route once daily., Disp: 15.8 mL, Rfl: 0    cefdinir (OMNICEF) 300 MG capsule, Take 1 capsule (300 mg total) by mouth 2 (two) times daily. for 10 days, Disp: 20 capsule, Rfl: 0    meclizine (ANTIVERT) 25 mg tablet, Take 1 tablet (25 mg total) by mouth 3 (three) times daily as needed for Dizziness., Disp: 30 tablet, Rfl: 0    Review of Systems   HENT:  Positive for ear pain. Negative for ear discharge, hearing loss, rhinorrhea and sore throat.    Respiratory:  Negative for cough.    Gastrointestinal:  Negative for abdominal pain, diarrhea and vomiting.   Musculoskeletal:  Negative for neck pain.   Skin:  Negative for rash.   Neurological:  Negative for headaches.          Objective:      Vitals:    07/29/24 1008   BP: 128/80   Pulse: 92   Resp: 20   Temp: 98 °F (36.7 °C)   TempSrc: Oral   Weight: 82.3 kg (181 lb 8 oz)   Height: 5' 7" (1.702 m)     Physical Exam  Constitutional:       Appearance: Normal appearance.   HENT:      Right Ear: Hearing normal. Drainage and tenderness present. A middle ear effusion is present. Tympanic membrane is erythematous.      Left Ear: Hearing normal. Tenderness present. A middle ear effusion is present.   Cardiovascular:      Rate and Rhythm: Normal rate and regular rhythm.      Heart sounds: Normal heart sounds.   Pulmonary:      Breath sounds: Normal breath sounds.   Abdominal:      General: Bowel sounds are normal.      Palpations: Abdomen is soft.   Neurological:      Mental Status: She is alert and oriented to person, place, and time.           Assessment:       1. Right otitis media with effusion    2. Acute effusion of right ear           Plan:       Right otitis media with effusion  -     Ambulatory referral/consult to ENT; Future; Expected date: 08/05/2024  -     cefdinir (OMNICEF) 300 MG capsule; Take 1 capsule (300 mg total) by mouth 2 " (two) times daily. for 10 days  Dispense: 20 capsule; Refill: 0    Acute effusion of right ear  -     meclizine (ANTIVERT) 25 mg tablet; Take 1 tablet (25 mg total) by mouth 3 (three) times daily as needed for Dizziness.  Dispense: 30 tablet; Refill: 0      Follow up if symptoms worsen or fail to improve.        7/29/2024 Danuta Eddy, NP       Lot #: [id number] Lot #: [id number]

## 2024-08-13 ENCOUNTER — CLINICAL SUPPORT (OUTPATIENT)
Dept: REHABILITATION | Facility: HOSPITAL | Age: 34
End: 2024-08-13
Payer: MEDICAID

## 2024-08-13 DIAGNOSIS — M54.6 ACUTE RIGHT-SIDED THORACIC BACK PAIN: ICD-10-CM

## 2024-08-13 DIAGNOSIS — G89.29 CHRONIC LEFT-SIDED LOW BACK PAIN WITH LEFT-SIDED SCIATICA: ICD-10-CM

## 2024-08-13 DIAGNOSIS — M54.42 CHRONIC LEFT-SIDED LOW BACK PAIN WITH LEFT-SIDED SCIATICA: ICD-10-CM

## 2024-08-13 PROCEDURE — 97530 THERAPEUTIC ACTIVITIES: CPT | Mod: PO

## 2024-08-13 PROCEDURE — 97161 PT EVAL LOW COMPLEX 20 MIN: CPT | Mod: PO

## 2024-08-13 NOTE — PLAN OF CARE
[facility]  Physical Therapy Lumbar Evaluation      Name: Jane Garcia  Clinic Number: [id number]    Therapy Diagnosis:   Encounter Diagnoses   Name Primary?    Acute right-sided thoracic back pain     Chronic left-sided low back pain with left-sided sciatica      Physician: Barbara Burks FNP-C    Physician Orders: PT Eval and Treat  Medical Diagnosis from Referral:   Acute right-sided thoracic back pain  - Primary    M54.6 724.1   Chronic left-sided low back pain with left-sided sciatica    M54.42  G89.29      Evaluation Date: 8/13/2024  Authorization Period Expiration: 07-  Plan of Care Expiration: 10/22/2024  Reassessment Due: 9/13/2024  Visit # / Visits authorized: 1/1      Time In: 1330  Time Out: 1430  Total Billable Time: 55 minutes  INSURANCE and OUTCOMES: Fee for Service with FOTO Outcomes 1/3    Precautions: standard    Pattern of pain determined: 1 PEP    Subjective     Date of onset: 1 year ago  History of current condition: Jane reports she bent forward to  clothes off the floor and could not get back up.  We to ED with severe right of center low back pain.   At the end of treatment, she reported 0/10 low back pain.     Medical History:   Past Medical History:   Diagnosis Date    Allergy     Anxiety        Surgical History:   Jane Garcia  has a past surgical history that includes Hernia repair and Laparoscopic cholecystectomy (N/A, 5/23/2022).    Medications:   Jane has a current medication list which includes the following prescription(s): cetirizine, fluticasone propionate, meclizine, and [DISCONTINUED] loratadine.    Allergies:   Review of patient's allergies indicates:  No Known Allergies     Imaging: X-ray   IMPRESSION: 05-     Negative lumbar spine radiography.    Prior Therapy: PT prior, minimal success.  Unable attend regularly 2' family obligations  Prior Treatment: muscle relaxers and pain meds  Social  "History: 2 story home with railing,  lives with their family  Occupation: working as a , stands all day  Leisure: dance, scrapbooking      Prior Level of Function: indep  Current Level of Function: independent with limitations 2' pain.   Unable to stand without exacerbations of her sxs  DME owned/used: none  Gym Membership: no    Pain:  Current 5/10, worst 10/10, best 4/10   Location: right low back   Description: Aching, Dull, and Sharp  Aggravating Factors: Standing, Bending, Flexing, Lifting, and Getting out of bed/chair  Easing Factors: ice, heating pad, and biofreeze.   Interruption of static positions  Disturbed Sleep: able to fall asleep with multiple pillows for support. Has not recently been awakened by pain.    Pattern of pain questions:  1.  Where is your pain the worst? Right low back, broad distribution  2.  Is your pain constant or intermittent? constant  3.  Does bending forward make your typical pain worse? yes  4.  Since the start of your back pain, has there been a change in your bowel or bladder? Frequent UTIs  5.  What can't you do now that you use to be able to do? FORWARD BENDING, prolonged stance    Pts goals: relief of her symptoms     Red Flag Screening:   Cough/Sneeze Strain: (--)  Bladder/Bowel: (--)  Falls: (--)  Night pain: (--)  Unexplained weight loss: (--)  General health: "fair",  lacks exercise 2' low back pain     Objective      Postural examination/scapula alignment: Rounded shoulder, Slouched posture, Increased kyphosis, and protracted shoulder bilat  Joint integrity: Firm end feeling  Skin integrity:WNL   Edema: None  Correction of posture: better with lumbar roll  Sitting: slouched  Standing: slouched    MOVEMENT LOSS - Lumbar   Norms ROM Loss Initial   Flexion Fingers touch toes, sacral angle >/= 70 deg, uniform spinal curvature, posterior weight shift  moderate loss   Extension ASIS surpasses toes, spine of scapulae surpasses heels, uniform spinal curve minimal loss "   Side glide Right  within functional limits   Side glide Left  minimal loss   Rotation Right PT observes contralateral shoulder within functional limits   Rotation Left PT observes contralateral shoulder within functional limits     Lower Extremity Strength  Right LE  Left LE    Hip flexion: 4+/5 Hip flexion: 5/5   Hip extension:  3+/5 Hip extension: 4-/5   Hip abduction: 4/5 Hip abduction: 4/5   Hip adduction:  5/5 Hip adduction:  5/5   Hip External Rotation 4+/5 Hip External Rotation 4+/5   Hip Internal rotation   5/5 Hip Internal rotation 5/5   Knee Flexion 5/5 Knee Flexion 5/5   Knee Extension 5/5 Knee Extension 5/5   Ankle dorsiflexion: 5/5 Ankle dorsiflexion: 5/5   Ankle plantarflexion:  Ankle plantarflexion:      GAIT:  Assistive Device used: none  Level of Assistance: independent  Patient displays the following gait deviations: no gait deviations observed.     Special Tests:   Test Name  Test Result   Prone Instability Test    SI Joint Provocation Test (--)   Straight Leg Raise (--)   Neural Tension Test (--)   Crossed Straight Leg Raise    Walking on toes Able   Walking on heels  Able   [place]'s test (-) left, equivocable right      NEUROLOGICAL SCREENING:     Sensory deficits: patient denies paresthesias      REPEATED TEST MOVEMENTS:    Baseline symptoms:  Repeated Flexion in Standing end range pain  produced   Repeated Extension in Standing end range pain  better   Repeated Flexion in lying    Repeated Extension in lying  end range pain  better       STATIC TESTS and other movements:   Prone lie no effect   Prone lie on elbows no effect   Sitting slouched  no effect   Sitting erect better   Standing slouched no effect   Standing erect  no effect   Lying prone in extension  better   Long sitting      Sustained flexion    Sustained prone using mat      Lumbar testing deferred until transfer to Healthy [facility] program   Lumbar Isometric Testing on Med X equipment: Testing administered by PT    Test Initial  Midpoint Final   Date      ROM  deg     Max Peak Torque       Min Peak Torque       Flex/Ext Ratio      % below normative data      % gain from initial test Not available visit 1         OUTCOMES SELECTION:   Intake Outcome Measure for FOTO initial evaluation  Survey    Therapist reviewed FOTO scores for Jane Garcia on 8/13/2024.   FOTO documents entered into [employer] - see Media section.    Intake Score: 52% functional ability  Goal Score: 62% functional ability            Treatment     Total Treatment time separate from Evaluation: 25 minutes    Jane received therapeutic exercises to develop/improve posture, lumbar ROM, strength, and muscular endurance for 0 minutes including the following exercises:       Written Home Exercises Provided: yes.    HEP AS FOLLOWS:  See wrap up     Exercises were reviewed and Jane was able to demonstrate them prior to the end of the session. Jane demonstrated good  understanding of the education provided.     See EMR under Patient Instructions for exercises provided 8/13/2024.      Therapeutic Education/Activity provided for 25 minutes:   - Patient was given an [facility] Visit 1 handout which discusses the following:  - what to expect in therapy  - an overview of the program, including health coaching and wellness  - importance of spinal hygiene, proper posture, lifting mechanics, sleep quality, and nutrition/hydration   - Chrissie roll trialed, recommended, and purchase information was provided.  - Patient received a handout regarding anticipated muscular soreness following the isometric test and strategies for management were reviewed with patient including stretching, using ice and scheduled rest.   - Patient was instructed and practiced exercise per 1 PEP protocol    Jane received cold pack for 8 minutes to low back  in Z-lie.    Assessment     Jane is a 33 y.o. female referred to [facility]  with a medical diagnosis of    ICD-10-CM ICD-9-CM   Acute  right-sided thoracic back pain  - Primary    M54.6 724.1   Chronic left-sided low back pain with left-sided sciatica    M54.42  G89.29    Pt presents with chronic left low back pain with intermittent radicular symptoms on the left upper leg in a stocking distribution, that is reproduced with FLEXION and reduced with extension indicating directional preference of extension.   Upon physical assessment, pt demonstrates slouched posturing in sitting, mild hip weakness bilaterally, and trunk and hip mobility deficits. Upon further local examination, hip, lumbar, and thoracic rotation were all limited.  All of the above noted supports potential lumbar classification as a pattern 1 PEP with recurrent/ or consistent symptoms, thus pt is a good candidate for the Healthy Back Program. Pt would benefit from LE and trunk mobility training, stability training,  improved cardiovascular and muscular endurance, neuromuscular re-education for posture, coordination, and muscular recruitment and education on positional offloading techniques to decrease the intensity and frequency of flare-ups.      Pain Pattern: 1 PEP       Pt prognosis is Good.     Pt will benefit from skilled outpatient Physical Therapy to address the deficits stated above and in the chart below, to provide pt/family education, and to maximize pt's level of independence. Based on the above history and physical examination an active physical therapy program is recommended.      Plan of care discussed with patient: Yes  Pt's spiritual, cultural and educational needs considered and patient is agreeable to the plan of care and goals as stated below:     Anticipated Barriers for therapy: none    PT Evaluation Completed? Yes    Medical necessity is demonstrated by the following problem list:    History  Co-morbidities and personal factors that may impact the plan of care [x] LOW: no personal factors / co-morbidities  [] MODERATE: 1-2 personal factors / co-morbidities  []  HIGH: 3+ personal factors / co-morbidities    Moderate / High Support Documentation:   Co-morbidities affecting plan of care: none    Personal Factors:   Lacks effective home management program     Examination  Body Structures and Functions, activity limitations and participation restrictions that may impact the plan of care [] LOW: addressing 1-2 elements  [] MODERATE: 3+ elements  [x] HIGH: 4+ elements (please support below)    Moderate / High Support Documentation:  Body Regions:   back  lower extremities  trunk    Body Systems:    gross symmetry  ROM  strength  posture    Participation Restrictions:   none    Activity limitations:   Learning and applying knowledge  no deficits    General Tasks and Commands  no deficits    Communication  no deficits    Mobility  lifting and carrying objects    Self care  no deficits    Domestic Life  Heavy housework    Interactions/Relationships  no deficits    Life Areas  no deficits    Community and Social Life  no deficits            Clinical Presentation [] LOW: stable  [x] MODERATE: Evolving  [] HIGH: Unstable     Decision Making/ Complexity Score: low         GOALS: Pt is in agreement with the following goals.    Short term goals:  6 weeks or 10 visits   - Pt will demonstrate increased lumbar MedX ROM by at least TBD degrees from the initial ROM value with improvements noted in functional ROM and ability to perform ADLs. Appropriate and Ongoing  - Pt will demonstrate increased MedX average isometric strength value by TBD % from initial test resulting in improved ability to perform bending, lifting, and carrying activities safely, confidently. Appropriate and Ongoing  - Pt will report a reduction in worst pain score by 1-2 points for improved tolerance for stance to complete her job as a . Appropriate and Ongoing  - Pt able to perform HEP correctly with minimal cueing or supervision from therapist to encourage independent management of symptoms. Appropriate and  Ongoing    Long term goals: 10 weeks or 20 visits   - Pt will demonstrate increased lumbar MedX ROM by at least TBD  degrees from initial ROM value, resulting in improved ability to perform functional forward bending while standing and sitting. Appropriate and Ongoing  - Pt will demonstrate increased MedX average isometric strength value by TBD % from initial test resulting in improved ability to perform bending, lifting, and carrying activities safely and confidently. Appropriate and Ongoing  - Pt to demonstrate ability to independently control and reduce their pain through posture positioning and mechanical movements throughout a typical day. Appropriate and Ongoing  - Pt will demonstrate reduced pain and improved functional outcomes as reported on the FOTO by reaching an intake score of >/= 62% functional ability in order to demonstrate subjective improvement in patient's condition. . Appropriate and Ongoing  - Pt will demonstrate independence with the HEP at discharge. Appropriate and Ongoing  - Patient to be able to work a full shift as a  without significant exacerbation of her symptoms. (patient goal) Appropriate and Ongoing    Plan     Outpatient physical therapy 2x week for 10 weeks or 20 visits to include the following:   - Patient education  - Therapeutic exercise  - Manual therapy  - Performance testing   - Neuromuscular Re-education  - Therapeutic activity   - Modalities    Pt may be seen by PTA as part of the rehabilitation team.     Therapist: Maria Huddleston, PT CLT  8/13/2024

## 2024-08-16 ENCOUNTER — PATIENT MESSAGE (OUTPATIENT)
Dept: FAMILY MEDICINE | Facility: CLINIC | Age: 34
End: 2024-08-16
Payer: MEDICAID

## 2024-08-21 ENCOUNTER — CLINICAL SUPPORT (OUTPATIENT)
Dept: REHABILITATION | Facility: HOSPITAL | Age: 34
End: 2024-08-21
Payer: MEDICAID

## 2024-08-21 DIAGNOSIS — M62.89 MUSCLE TIGHTNESS: ICD-10-CM

## 2024-08-21 DIAGNOSIS — R26.89 DECREASED FUNCTIONAL MOBILITY: ICD-10-CM

## 2024-08-21 DIAGNOSIS — R53.1 DECREASED STRENGTH: Primary | ICD-10-CM

## 2024-08-21 PROCEDURE — 97110 THERAPEUTIC EXERCISES: CPT | Mod: PO

## 2024-08-21 PROCEDURE — 97112 NEUROMUSCULAR REEDUCATION: CPT | Mod: PO

## 2024-08-21 NOTE — PROGRESS NOTES
[facility]  Physical Therapy Treatment Note     Name: Jane Garcia  Clinic Number: [id number]    Therapy Diagnosis:   Encounter Diagnoses   Name Primary?    Decreased strength Yes    Muscle tightness     Decreased functional mobility      Physician: Barbara Burks FNP-C    Visit Date: 8/21/2024    Physician: Barbara Burks FNP-C     Physician Orders: PT Eval and Treat  Medical Diagnosis from Referral:   Acute right-sided thoracic back pain  - Primary    M54.6 724.1   Chronic left-sided low back pain with left-sided sciatica    M54.42  G89.29        Evaluation Date: 8/13/2024  Authorization Period Expiration: 07-  Plan of Care Expiration: 10/22/2024  Reassessment Due: 9/13/2024  Visit # / Visits authorized: 1/10  MedX Testing:MedX testing visit 2    PTA Visit #: 0/5     Time In: 1000  Time Out: 1053  Total Billable Time: 53 minutes  INSURANCE and OUTCOMES: Fee for Service with FOTO Outcomes 1/3    Precautions: standard    Pattern of pain determined: 1 PEP    Subjective     Jane Garcia reports improvement of symptoms.  She has been her home exercise program consistently and her symptoms have improved.  Minor flare up over the weekend with housework, managed with heating pad, extension in stance and motrin.   She has a new job that has less standing during the work day.      Patient reports tolerating previous visit very well.  Had soreness, not reproduction of her pain after initial visit.   Patient reports their pain to be  0 /10 on a 0-10 scale with 0 being no pain and 10 being the worst pain imaginable.    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  Leisure: dance, scrapbooking        Pt goals: relief of her symptoms     Objective      Lumbar  Isometric Testing on Med X equipment: Testing administered by PT    Test Initial Baseline Midpoint Final   Date      ROM  deg     Max Peak Torque        Min Peak Torque       Flex/Ext Ratio      % variance  normative data      % change from initial test N/A visit 1         Outcomes:  Intake Score: 52%  Visit 6 Score:   Visit 10 Score:   Discharge Score:  Goal Score: 62%       OUTCOMES SELECTION:   Intake Outcome Measure for FOTO initial evaluation  Survey     Therapist reviewed FOTO scores for Jane Gacria on 8/13/2024.   FOTO documents entered into [employer] - see Media section.     Intake Score: 52% functional ability  Goal Score: 62% functional ability              Treatment     Jane received the treatments listed below:      Jane participated in neuromuscular re-education activities to improve balance, coordination, proprioception, motor control and/or posture for 40 minutes. The following activities were included:    Extension in stance with verbal cues for end range of motion, 2 x 10    Bridging, cueing for muscle activation and recruitment, 2 x 10  1/2 bridging, left and right x 10 each    Sidelying  Clams, left and right, 2 x 10 each  Sidelying PNF posterior dep, manual cues for technique, left and right x 10 each    Prone  Hip extension, left and right 2 x 10  Glute maximum raises, left and right x 10    Stance   Isometric trunk rotations, left and right, blue theraband, x 10 each    Jane participated in therapeutic exercises to develop strength, endurance, ROM, flexibility, posture, and core stabilization for 8 minutes including:    Retro TM x 8 mins, 0.8 mph    Peripheral muscle strengthening which included one set of 15-20 repetitions at a slow and controlled 10-13 second per rep pace focused on strengthening supporting musculature in order to improve body mechanics and functional mobility. Patient and therapist focused on proper form during treatment to ensure optimal strengthening of each targeted muscle group.  Machines utilized included:  To be added next visit:      Jane participated in dynamic functional therapeutic activities to  improve functional performance and simulate household and community activities for 0  minutes. The following activities were included:      Jane received manual therapy techniques for 0  minutes. The following activities were included:    Pt given cold pack for 0 minutes to low back  in z-lie .    Patient Education and Home Exercises     Home exercises include:  Extension in stance, slouch overcorrection  Cardio program (V5): -  Lifting education (V11): -  Posture/Lumbar roll: recommended  Fridge Magnet Discharge handout (date given): -  Equipment at home/gym membership: yes - doesn't use    Education provided:   - PT role and POC  - HEP  - reviewed home exercise program from prior    Written Home Exercises Provided: Patient instructed to cont prior HEP.  Exercises were reviewed and Jane was able to demonstrate them prior to the end of the session.  Jane demonstrated good  understanding of the education provided.     See EMR under Patient Instructions for exercises provided prior visit.    Assessment     Jane presents to second healthy back visit reporting , was able to demo HEP with Min VC for form.  She demonstrated improved self management of her symptoms at home and improved postural awareness.  Her acute on chronic symptoms exacerbation has quieted and she is appropriate for transfer to the Healthy Back program.   Her job change to a  where she is able to change her position more frequently will assist with symptoms management.    Patient is making good progress towards established goals.  Pt will continue to benefit from skilled outpatient physical therapy to address the deficits stated in the impairment chart, provide pt/family education and to maximize pt's level of independence in the home and community environment.     Anticipated Barriers for therapy: chronicity of her symptoms.  Pt's spiritual, cultural and educational needs considered and pt agreeable to plan of care and  goals as stated below:     Short term goals:  6 weeks or 10 visits   - Pt will demonstrate increased lumbar MedX ROM by at least TBD degrees from the initial ROM value with improvements noted in functional ROM and ability to perform ADLs. Appropriate and Ongoing  - Pt will demonstrate increased MedX average isometric strength value by TBD % from initial test resulting in improved ability to perform bending, lifting, and carrying activities safely, confidently. Appropriate and Ongoing  - Pt will report a reduction in worst pain score by 1-2 points for improved tolerance for stance to complete her job as a . Appropriate and Ongoing  - Pt able to perform HEP correctly with minimal cueing or supervision from therapist to encourage independent management of symptoms. Appropriate and Ongoing     Long term goals: 10 weeks or 20 visits   - Pt will demonstrate increased lumbar MedX ROM by at least TBD  degrees from initial ROM value, resulting in improved ability to perform functional forward bending while standing and sitting. Appropriate and Ongoing  - Pt will demonstrate increased MedX average isometric strength value by TBD % from initial test resulting in improved ability to perform bending, lifting, and carrying activities safely and confidently. Appropriate and Ongoing  - Pt to demonstrate ability to independently control and reduce their pain through posture positioning and mechanical movements throughout a typical day. Appropriate and Ongoing  - Pt will demonstrate reduced pain and improved functional outcomes as reported on the FOTO by reaching an intake score of >/= 62% functional ability in order to demonstrate subjective improvement in patient's condition. . Appropriate and Ongoing  - Pt will demonstrate independence with the HEP at discharge. Appropriate and Ongoing  - Patient to be able to work a full shift as a  without significant exacerbation of her symptoms. (patient goal) Appropriate and  Ongoing     Plan      Outpatient physical therapy 2x week for 10 weeks or 20 visits to include the following:   - Patient education  - Therapeutic exercise  - Manual therapy  - Performance testing   - Neuromuscular Re-education  - Therapeutic activity   - Modalities     Pt may be seen by PTA as part of the rehabilitation team.      Therapist: Maria Huddleston, PT CLT    Therapist: aMria Huddleston, PT  8/21/2024

## 2024-08-27 ENCOUNTER — CLINICAL SUPPORT (OUTPATIENT)
Dept: REHABILITATION | Facility: HOSPITAL | Age: 34
End: 2024-08-27
Payer: MEDICAID

## 2024-08-27 DIAGNOSIS — R53.1 DECREASED STRENGTH: Primary | ICD-10-CM

## 2024-08-27 DIAGNOSIS — M62.89 MUSCLE TIGHTNESS: ICD-10-CM

## 2024-08-27 DIAGNOSIS — R26.89 DECREASED FUNCTIONAL MOBILITY: ICD-10-CM

## 2024-08-27 PROCEDURE — 97750 PHYSICAL PERFORMANCE TEST: CPT | Mod: PN

## 2024-08-27 PROCEDURE — 97110 THERAPEUTIC EXERCISES: CPT | Mod: PN

## 2024-08-27 PROCEDURE — 97112 NEUROMUSCULAR REEDUCATION: CPT | Mod: PN

## 2024-08-27 NOTE — PROGRESS NOTES
[facility]  Physical Therapy Treatment Note     Name: Jane Garcia  Clinic Number: [id number]    Therapy Diagnosis:   Encounter Diagnoses   Name Primary?    Decreased strength Yes    Muscle tightness     Decreased functional mobility        Physician: Barbara Burks FNP-C    Visit Date: 8/27/2024    Physician: Barbara Burks FNP-C     Physician Orders: PT Eval and Treat  Medical Diagnosis from Referral:   Acute right-sided thoracic back pain  - Primary    M54.6 724.1   Chronic left-sided low back pain with left-sided sciatica    M54.42  G89.29        Evaluation Date: 8/13/2024  Authorization Period Expiration: 07-  Plan of Care Expiration: 10/22/2024  Reassessment Due: 9/13/2024  Visit # / Visits authorized: 2/10; 1 HB  MedX Testing:MedX testing visit 2    PTA Visit #: 0/5     Time In: 1000  Time Out: 1100  Total Billable Time: 55 minutes  INSURANCE and OUTCOMES: Fee for Service with FOTO Outcomes 1/3    Precautions: standard    Pattern of pain determined: 1 PEP    Subjective     Jane Garcia reports exacerbation of her symptoms, primarily with transitional movements.  Pain has limited her capacity to complete extension in stance for self management.    Patient reports tolerating previous visit very well.  Had soreness, not reproduction of her pain after initial visit.   Patient reports their pain to be  5 /10 on a 0-10 scale with 0 being no pain and 10 being the worst pain imaginable.    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[employer]  Leisure: dance, scrapbooking        Pt goals: relief of her symptoms     Objective      Lumbar  Isometric Testing on Med X equipment: Testing administered by PT    Test Initial Baseline Midpoint Final   Date 1988     ROM  0-42deg     Max Peak Torque  48     Min Peak Torque  16     Flex/Ext Ratio 3:1     % variance  normative data (-)74%     % change from  initial test N/A visit 1           Outcomes:  Intake Score: 52%  Visit 6 Score:   Visit 10 Score:   Discharge Score:  Goal Score: 62%       OUTCOMES SELECTION:   Intake Outcome Measure for FOTO initial evaluation  Survey     Therapist reviewed FOTO scores for Jane Garcia on 8/13/2024.   FOTO documents entered into [employer] - see Media section.     Intake Score: 52% functional ability  Goal Score: 62% functional ability              Treatment     Jane received the treatments listed below:      Jane participated in neuromuscular re-education activities to improve balance, coordination, proprioception, motor control and/or posture for 40 minutes. The following activities were included:    MedX testing completed this date        8/27/2024    10:40 AM   [facility]   Visit Number 1   VAS Pain Rating 5   Lumbar Extension Seat Pad 0   Femur Restraint 6   Top Dead Center 24   Counterweight 168   Lumbar Flexion 42   Lumbar Extension 0   Lumbar Peak Torque 48 ft. lbs.   Min Torque 16   Test Percent Below Normative Data 74 %         Extension in stance with verbal cues for end range of motion, 2 x 10    Seated slouch over-correction x 20    Bridging, cueing for muscle activation and recruitment, 2 x 10  1/2 bridging, left and right x 10 each    Sidelying   Clams, left and right,  x 10 each  Sidelying PNF posterior dep, manual cues for technique, left and right x 10 each    Prone  Hip extension, left and right  x 10  Glute maximum raises, left and right x 10    Stance NOT completed this date   Isometric trunk rotations, left and right, blue theraband, x 10 each      Jane participated in therapeutic exercises to develop strength, endurance, ROM, flexibility, posture, and core stabilization for 8 minutes including:    Retro TM x 6.5 mins, 0.8 mph    Peripheral muscle strengthening which included one set of 15-20 repetitions at a slow and controlled 10-13 second per rep pace focused on strengthening supporting  musculature in order to improve body mechanics and functional mobility. Patient and therapist focused on proper form during treatment to ensure optimal strengthening of each targeted muscle group.  Machines utilized included: upper body  To be added next visit: legs      Jane participated in dynamic functional therapeutic activities to improve functional performance and simulate household and community activities for 0  minutes. The following activities were included:      Jane received manual therapy techniques for 0  minutes. The following activities were included:    Pt given cold pack for 5 minutes to low back  in z-lie .    Patient Education and Home Exercises     Home exercises include:  Extension in stance, slouch overcorrection  Cardio program (V5): - V3  Lifting education (V11): - TBD  Posture/Lumbar roll: recommended  Fridge Magnet Discharge handout (date given): - TBD  Equipment at home/gym membership: yes - doesn't use    Education provided:   - PT role and POC  - HEP  - reviewed home exercise program from prior    Written Home Exercises Provided: Patient instructed to cont prior HEP.  Exercises were reviewed and Jane was able to demonstrate them prior to the end of the session.  Jane demonstrated good  understanding of the education provided.     See EMR under Patient Instructions for exercises provided prior visit.    Assessment     Jane presents to second visit reporting exacerbation of her symptoms, was able to demo HEP with Min VC for form.      Jane presents for transfer from ortho to healthy back initial visit reporting improved postural awareness, was able to demo HEP with Min VC for form. Pt was able to tolerate Medical MedX machine well as follows: MedX testing performed and patient tolerated test well.    Pt presents with reported chronic low back pain  that is reproduced with repeated forward flexion,  and reduced with extension indicating directional preference of  extension.   Upon physical assessment, pt demonstrates slouched posturing in sitting,  hip weakness bilaterally, and minor trunk and hip mobility deficits. Upon further local examination, hip, lumbar, and thoracic rotation were all limited significantly. Per lumbar MedX testing, pt was 74% below average in strength when compared to those of same age/height/weight/gender. All the above noted supports potential lumbar classification as a pattern 1 PEP with recurrent/ or consistent symptoms, thus pt is a good candidate for the [facility]. Pt would benefit from LE and trunk mobility training, stability training, improved cardiovascular and muscular endurance, neuromuscular re-education for posture, coordination, and muscular recruitment and education on positional offloading techniques to decrease the intensity and frequency of flare-ups.    Patient is making good progress towards established goals.    Pt will continue to benefit from skilled outpatient physical therapy to address the deficits stated in the impairment chart, provide pt/family education and to maximize pt's level of independence in the home and community environment.     Anticipated Barriers for therapy: chronicity of her symptoms.  Pt's spiritual, cultural and educational needs considered and pt agreeable to plan of care and goals as stated below:     Short term goals:  6 weeks or 10 visits   - Pt will demonstrate increased lumbar MedX ROM by at least 3 degrees from the initial ROM value with improvements noted in functional ROM and ability to perform ADLs. Appropriate and Ongoing  - Pt will demonstrate increased MedX average isometric strength value by 20 % from initial test resulting in improved ability to perform bending, lifting, and carrying activities safely, confidently. Appropriate and Ongoing  - Pt will report a reduction in worst pain score by 1-2 points for improved tolerance for stance to complete her job as a . Appropriate  and Ongoing  - Pt able to perform HEP correctly with minimal cueing or supervision from therapist to encourage independent management of symptoms. Appropriate and Ongoing     Long term goals: 10 weeks or 20 visits   - Pt will demonstrate increased lumbar MedX ROM by at least 6  degrees from initial ROM value, resulting in improved ability to perform functional forward bending while standing and sitting. Appropriate and Ongoing  - Pt will demonstrate increased MedX average isometric strength value by 40 % from initial test resulting in improved ability to perform bending, lifting, and carrying activities safely and confidently. Appropriate and Ongoing  - Pt to demonstrate ability to independently control and reduce their pain through posture positioning and mechanical movements throughout a typical day. Appropriate and Ongoing  - Pt will demonstrate reduced pain and improved functional outcomes as reported on the FOTO by reaching an intake score of >/= 62% functional ability in order to demonstrate subjective improvement in patient's condition. . Appropriate and Ongoing  - Pt will demonstrate independence with the HEP at discharge. Appropriate and Ongoing  - Patient to be able to work a full shift as a  without significant exacerbation of her symptoms. (patient goal) Appropriate and Ongoing     Plan      Outpatient physical therapy 2x week for 10 weeks or 20 visits to include the following:   - Patient education  - Therapeutic exercise  - Manual therapy  - Performance testing   - Neuromuscular Re-education  - Therapeutic activity   - Modalities     Pt may be seen by PTA as part of the rehabilitation team.   ]    Therapist: Maria Huddleston, PT CLT  8/27/2024

## 2024-09-04 ENCOUNTER — CLINICAL SUPPORT (OUTPATIENT)
Dept: REHABILITATION | Facility: HOSPITAL | Age: 34
End: 2024-09-04
Payer: MEDICAID

## 2024-09-04 DIAGNOSIS — R26.89 DECREASED FUNCTIONAL MOBILITY: ICD-10-CM

## 2024-09-04 DIAGNOSIS — M62.89 MUSCLE TIGHTNESS: ICD-10-CM

## 2024-09-04 DIAGNOSIS — R53.1 DECREASED STRENGTH: Primary | ICD-10-CM

## 2024-09-04 PROCEDURE — 97110 THERAPEUTIC EXERCISES: CPT | Mod: PN,CQ

## 2024-09-13 ENCOUNTER — CLINICAL SUPPORT (OUTPATIENT)
Dept: REHABILITATION | Facility: HOSPITAL | Age: 34
End: 2024-09-13
Payer: MEDICAID

## 2024-09-13 DIAGNOSIS — R26.89 DECREASED FUNCTIONAL MOBILITY: ICD-10-CM

## 2024-09-13 DIAGNOSIS — M62.89 MUSCLE TIGHTNESS: ICD-10-CM

## 2024-09-13 DIAGNOSIS — R53.1 DECREASED STRENGTH: Primary | ICD-10-CM

## 2024-09-13 PROCEDURE — 97110 THERAPEUTIC EXERCISES: CPT | Mod: PN,CQ

## 2024-09-13 NOTE — PROGRESS NOTES
[facility]  Physical Therapy Treatment Note     Name: Jane Garcia  Clinic Number: [id number]    Therapy Diagnosis:   Encounter Diagnoses   Name Primary?    Decreased strength Yes    Muscle tightness     Decreased functional mobility        Physician: Barbara Burks FNP-C    Visit Date: 9/13/2024    Physician: Barbara Burks FNP-C     Physician Orders: PT Eval and Treat  Medical Diagnosis from Referral:   Acute right-sided thoracic back pain  - Primary    M54.6 724.1   Chronic left-sided low back pain with left-sided sciatica    M54.42  G89.29        Evaluation Date: 8/13/2024  Authorization Period Expiration: 07-  Plan of Care Expiration: 10/22/2024  Reassessment Due: 9/13/2024  Visit # / Visits authorized: 9/10; 4 HB  MedX Testing:MedX testing visit 2    PTA Visit #: 2/5     Time In: 1000  Time Out: 1105  Total Billable Time: 60 minutes (+cold pack x 5 minutes)  INSURANCE and OUTCOMES: Fee for Service with FOTO Outcomes 1/3    Precautions: standard    Pattern of pain determined: 1 PEP    Subjective     Jane Garcia reports she is not having back pain right not but did last night.  Pt stated she had trouble getting to sleep and getting comfortable and had to take a muscle relaxer.  Pt also stated she started jogging this morning and she is a little sore from that.      Patient reports tolerating previous visit very well.  Had soreness, not reproduction of her pain after initial visit.   Patient reports their pain to be 0/10 on a 0-10 scale with 0 being no pain and 10 being the worst pain imaginable.    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[employer]  Leisure: dance, scrapbooking        Pt goals: relief of her symptoms     Objective      Lumbar  Isometric Testing on Med X equipment: Testing administered by PT    Test Initial Baseline Midpoint Final   Date 1988     ROM  0-42deg     Max Peak  Torque  48     Min Peak Torque  16     Flex/Ext Ratio 3:1     % variance  normative data (-)74%     % change from initial test N/A visit 1       Outcomes:  Intake Score: 52%  Visit 6 Score:   Visit 10 Score:   Discharge Score:  Goal Score: 62%         Treatment     Jane received the treatments listed below:      ++All charges filed per Medicaid Guidelines++  Therapeutic exercises 60  minutes    Jane participated in neuromuscular re-education activities to improve balance, coordination, proprioception, motor control and/or posture. The following activities were included:          9/13/2024    10:44 AM   [facility]   Visit Number 3   VAS Pain Rating 0   Treadmill Time (in min.) 5 min   Speed 1 mph   Extension in Lying 10   Extension in Standing 20   Flexion in Lying 10   Flexion in Sitting 10   Lumbar Flexion 48   Lumbar Weight 45 lbs   Repetitions 15   Rating of Perceived Exertion 4   Ice - Z Lie (in min.) 5        Extension in stance with verbal cues for end range of motion, 2 x 10 reps     Seated   slouch over-correction x 20 reps   Trunk flexion x 5 reps with over pressure    Supine  Bridging, cueing for muscle activation and recruitment, 2 x 10 reps   1/2 bridging, left and right x 10 reps each    Double knee to chest x 10 reps after (prone press ups with sag)    Transverse abdominal sets x 10 reps with physioball   Lower trunk rotations, left and right, x 10 each      Sidelying   Clams, red theraband, left and right, 2 x 10 each  Sidelying PNF posterior dep, manual cues for technique, left and right x 10 each (not performed)    Prone  Hip extension, left and right  x 10 reps   Glute maximum raises, left and right x 10 reps   Press ups x 10 reps   Press ups with sag x 10 reps (not performed)    Jane participated in therapeutic exercises to develop strength, endurance, ROM, flexibility, posture, and core stabilization including: Peripheral muscle strengthening using the Precor machines.    Retro  TM x 5 mins, 1.0 mph    Precor machines today - torso rotation, chest press, rows, hip Abduction, hip adduction, leg curls.      Patient Education and Home Exercises     Home exercises include:  Extension in stance, slouch overcorrection    Cardio program (V5): - ongoing  Lifting education (V11): - TBD  Posture/Lumbar roll: recommended  Fridge Magnet Discharge handout (date given): - TBD  Equipment at home/gym membership: yes - doesn't use    Education provided:   - PT role and Plan of Care   - Home exercise program       Written Home Exercises Provided: Patient instructed to cont prior HEP.  Exercises were reviewed and Jane was able to demonstrate them prior to the end of the session.  Jane demonstrated good  understanding of the education provided.     See EMR under Patient Instructions for exercises provided prior visit.    Assessment     Jane presents to PT with no pain today but had difficulty sleeping and getting comfortable last night.  MedX range of motion was increased to 48* of flexion.  She did well with the increase in range of motion but fatigued quicker and only able to do 15 reps @ 4 RPE.  Added hip adduction and leg curls Precor machine.  Pt feels she can increase the weight on hip Abduction/adduction machines.     Patient is making good progress towards established goals.    Pt will continue to benefit from skilled outpatient physical therapy to address the deficits stated in the impairment chart, provide pt/family education and to maximize pt's level of independence in the home and community environment.     Anticipated Barriers for therapy: chronicity of her symptoms.  Pt's spiritual, cultural and educational needs considered and pt agreeable to plan of care and goals as stated below:     Short term goals:  6 weeks or 10 visits   - Pt will demonstrate increased lumbar MedX ROM by at least 3 degrees from the initial ROM value with improvements noted in functional ROM and ability to perform  ADLs. Appropriate and Ongoing  - Pt will demonstrate increased MedX average isometric strength value by 20 % from initial test resulting in improved ability to perform bending, lifting, and carrying activities safely, confidently. Appropriate and Ongoing  - Pt will report a reduction in worst pain score by 1-2 points for improved tolerance for stance to complete her job as a . Appropriate and Ongoing  - Pt able to perform HEP correctly with minimal cueing or supervision from therapist to encourage independent management of symptoms. Appropriate and Ongoing     Long term goals: 10 weeks or 20 visits   - Pt will demonstrate increased lumbar MedX ROM by at least 6  degrees from initial ROM value, resulting in improved ability to perform functional forward bending while standing and sitting. Appropriate and Ongoing  - Pt will demonstrate increased MedX average isometric strength value by 40 % from initial test resulting in improved ability to perform bending, lifting, and carrying activities safely and confidently. Appropriate and Ongoing  - Pt to demonstrate ability to independently control and reduce their pain through posture positioning and mechanical movements throughout a typical day. Appropriate and Ongoing  - Pt will demonstrate reduced pain and improved functional outcomes as reported on the FOTO by reaching an intake score of >/= 62% functional ability in order to demonstrate subjective improvement in patient's condition. . Appropriate and Ongoing  - Pt will demonstrate independence with the HEP at discharge. Appropriate and Ongoing  - Patient to be able to work a full shift as a  without significant exacerbation of her symptoms. (patient goal) Appropriate and Ongoing     Plan      Outpatient physical therapy 2x week for 10 weeks or 20 visits to include the following:   - Patient education  - Therapeutic exercise  - Manual therapy  - Performance testing   - Neuromuscular Re-education  -  Therapeutic activity   - Modalities     Pt may be seen by PTA as part of the rehabilitation team.           Yolie Ag, PTA  9/13/2024

## 2024-10-08 ENCOUNTER — CLINICAL SUPPORT (OUTPATIENT)
Dept: REHABILITATION | Facility: HOSPITAL | Age: 34
End: 2024-10-08
Payer: MEDICAID

## 2024-10-08 DIAGNOSIS — R53.1 DECREASED STRENGTH: Primary | ICD-10-CM

## 2024-10-08 DIAGNOSIS — R26.89 DECREASED FUNCTIONAL MOBILITY: ICD-10-CM

## 2024-10-08 DIAGNOSIS — M62.89 MUSCLE TIGHTNESS: ICD-10-CM

## 2024-10-08 PROCEDURE — 97110 THERAPEUTIC EXERCISES: CPT | Mod: PN

## 2024-10-08 NOTE — PROGRESS NOTES
[facility]  Physical Therapy Treatment Note     Name: Jane Garcia  Clinic Number: [id number]    Therapy Diagnosis:   Encounter Diagnoses   Name Primary?    Decreased strength Yes    Muscle tightness     Decreased functional mobility      Physician: Barbara Burks FNP-C    Visit Date: 10/8/2024    Physician: Barbara Burks FNP-C     Physician Orders: PT Eval and Treat  Medical Diagnosis from Referral:   Acute right-sided thoracic back pain  - Primary    M54.6 724.1   Chronic left-sided low back pain with left-sided sciatica    M54.42  G89.29        Evaluation Date: 8/13/2024  Authorization Period Expiration: 07-  Plan of Care Expiration: 10/22/2024  Reassessment Due: 9/13/2024  Visit # / Visits authorized: 5 /10; 5 HB  MedX Testing:MedX testing visit 2    PTA Visit #: 0/5     Time In: 1108 AM  Time Out: 1201  Total Billable Time: 53 minutes  INSURANCE and OUTCOMES: Fee for Service with FOTO Outcomes 1/3    Precautions: standard    Pattern of pain determined: 1 PEP    Subjective     Jane Garcia reports she continues to experience most of her pain at night and towards the end of the day. She is doing her extensions about 2x/day.    Patient reports tolerating previous visit very well.  Had soreness, not reproduction of her pain after initial visit.   Patient reports their pain to be 0/10 on a 0-10 scale with 0 being no pain and 10 being the worst pain imaginable.    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[employer]  Leisure: dance, scrapbooking        Pt goals: relief of her symptoms     Objective      Lumbar  Isometric Testing on Med X equipment: Testing administered by PT    Test Initial Baseline Midpoint Final   Date 1988     ROM  0-42deg     Max Peak Torque  48     Min Peak Torque  16     Flex/Ext Ratio 3:1     % variance  normative data (-)74%     % change from initial test N/A visit 1        Outcomes:  Intake Score: 52%  Visit 6 Score:   Visit 10 Score:   Discharge Score:  Goal Score: 62%         Treatment     Jane received the treatments listed below:      ++All charges filed per Medicaid Guidelines++    Jane participated in therapeutic exercises to develop strength, endurance, ROM, flexibility, posture, and core stabilization including: Peripheral muscle strengthening using the Precor machines x 53 minutes         10/8/2024    11:50 AM   [facility]   Visit Number 4   VAS Pain Rating 0   Lumbar Weight 45 lbs   Repetitions 20   Rating of Perceived Exertion 3      Seated - NP  slouch over-correction x 20 reps   Trunk flexion x 5 reps with over pressure    Supine  Bridging, cueing for muscle activation and recruitment, green theraband 2 x 10 reps     NP:  1/2 bridging, left and right x 10 reps each  Double knee to chest x 10 reps after (prone press ups with sag)  Transverse abdominal sets x 10 reps with physioball   Lower trunk rotations, left and right, x 10 each      Sidelying   Clams, red theraband, left and right, 2 x 10 each  Sidelying PNF posterior dep, manual cues for technique, left and right x 10 each (not performed)    Prone  Hip extension, left and right  x 10 reps   Glute maximum raises, left and right x 10 reps - NP  Press ups x 10 reps    Decrease better with standing extension stiffness and L SG , no effect on R rotation pain  Press ups with sag x 10 repetitions   Decrease better with R rotational pain in standing     Quadruped:   Bird/dog 5 repetitions each side 3 second hold      Peripheral muscle strengthening which included 1 set of 15-20 repetitions at a slow, controlled 10-13 second per rep pace focused on strengthening supporting musculature for improved body mechanics and functional mobility.  Pt and therapist focused on proper form during treatment to ensure optimal strengthening of each targeted muscle group.  Machines were utilized including torso rotation,  chest press, rowing, biceps, and triceps. Leg extension, leg curl, hip abd, hip add, and leg press.    Patient Education and Home Exercises     Home exercises include:  Extension in stance, or prone press up sag   Cardio program (V5): - ongoing  Lifting education (V11): - TBD  Posture/Lumbar roll: recommended  Fridge Magnet Discharge handout (date given): - TBD  Equipment at home/gym membership: yes - doesn't use    Education provided:   - PT role and Plan of Care   - Home exercise program     Written Home Exercises Provided: Patient instructed to cont prior HEP.  Exercises were reviewed and Jane was able to demonstrate them prior to the end of the session.  Jane demonstrated good  understanding of the education provided.     See EMR under Patient Instructions for exercises provided prior visit.    Assessment     Arrives without pain. She is fairly compliant with extensions at home. She demonstrated improvement in R rotation following prone press ups with sag so instructed patient to add these to HEP at home. She tolerated tx session well without complaints of pain. Medx remained the same today but decrease in RPE today from 4 to 3/10.     Patient is making good progress towards established goals.    Pt will continue to benefit from skilled outpatient physical therapy to address the deficits stated in the impairment chart, provide pt/family education and to maximize pt's level of independence in the home and community environment.     Anticipated Barriers for therapy: chronicity of her symptoms.  Pt's spiritual, cultural and educational needs considered and pt agreeable to plan of care and goals as stated below:     Short term goals:  6 weeks or 10 visits   - Pt will demonstrate increased lumbar MedX ROM by at least 3 degrees from the initial ROM value with improvements noted in functional ROM and ability to perform ADLs. Appropriate and Ongoing  - Pt will demonstrate increased MedX average isometric strength  value by 20 % from initial test resulting in improved ability to perform bending, lifting, and carrying activities safely, confidently. Appropriate and Ongoing  - Pt will report a reduction in worst pain score by 1-2 points for improved tolerance for stance to complete her job as a . Appropriate and Ongoing  - Pt able to perform HEP correctly with minimal cueing or supervision from therapist to encourage independent management of symptoms. Appropriate and Ongoing     Long term goals: 10 weeks or 20 visits   - Pt will demonstrate increased lumbar MedX ROM by at least 6  degrees from initial ROM value, resulting in improved ability to perform functional forward bending while standing and sitting. Appropriate and Ongoing  - Pt will demonstrate increased MedX average isometric strength value by 40 % from initial test resulting in improved ability to perform bending, lifting, and carrying activities safely and confidently. Appropriate and Ongoing  - Pt to demonstrate ability to independently control and reduce their pain through posture positioning and mechanical movements throughout a typical day. Appropriate and Ongoing  - Pt will demonstrate reduced pain and improved functional outcomes as reported on the FOTO by reaching an intake score of >/= 62% functional ability in order to demonstrate subjective improvement in patient's condition. . Appropriate and Ongoing  - Pt will demonstrate independence with the HEP at discharge. Appropriate and Ongoing  - Patient to be able to work a full shift as a  without significant exacerbation of her symptoms. (patient goal) Appropriate and Ongoing     Plan      Outpatient physical therapy 2x week for 10 weeks or 20 visits to include the following:   - Patient education  - Therapeutic exercise  - Manual therapy  - Performance testing   - Neuromuscular Re-education  - Therapeutic activity   - Modalities     Pt may be seen by PTA as part of the rehabilitation team.            Sonja Riggs, PT  10/8/2024

## 2024-11-06 PROBLEM — R26.89 DECREASED FUNCTIONAL MOBILITY: Status: RESOLVED | Noted: 2023-06-26 | Resolved: 2024-11-06

## 2024-11-06 PROBLEM — R53.1 DECREASED STRENGTH: Status: RESOLVED | Noted: 2023-06-26 | Resolved: 2024-11-06

## 2024-11-06 PROBLEM — M62.89 MUSCLE TIGHTNESS: Status: RESOLVED | Noted: 2023-06-26 | Resolved: 2024-11-06

## 2024-11-25 ENCOUNTER — CLINICAL SUPPORT (OUTPATIENT)
Dept: REHABILITATION | Facility: HOSPITAL | Age: 34
End: 2024-11-25
Payer: MEDICAID

## 2024-11-25 DIAGNOSIS — R26.89 DECREASED FUNCTIONAL MOBILITY: ICD-10-CM

## 2024-11-25 DIAGNOSIS — R53.1 DECREASED STRENGTH: Primary | ICD-10-CM

## 2024-11-25 PROCEDURE — 97110 THERAPEUTIC EXERCISES: CPT | Mod: PN

## 2024-11-25 NOTE — PLAN OF CARE
[facility]  Physical Therapy Plan of Care Note     Name: Jane Garcia  Clinic Number: [id number]    Therapy Diagnosis:   Encounter Diagnoses   Name Primary?    Decreased strength Yes    Decreased functional mobility      Physician: Barbara Burks FNP-C    Visit Date: 11/25/2024    Physician Orders: PT Eval and Treat  Medical Diagnosis from Referral:   Acute right-sided thoracic back pain  - Primary    M54.6 724.1   Chronic left-sided low back pain with left-sided sciatica    M54.42  G89.29        Evaluation Date: 8/13/2024  Authorization Period Expiration: 07-  Plan of Care Expiration: 10/22/2024 - see updated POC  Reassessment Due: 9/13/2024  Visit # / Visits authorized: 6 /10; 5 HB  MedX Testing:MedX testing visit 2    Precautions: Standard  Functional Level Prior to Evaluation:  independent     SUBJECTIVE     Update:     Jane Garcia reports she stopped coming to therapy due to work but she needs to come and take care of her back.     OBJECTIVE     Update:       Lumbar  Isometric Testing on Med X equipment: Testing administered by PT     Test Initial Baseline Midpoint Final   Date 1988 11/25/2024     ROM  0-42deg 0-54     Max Peak Torque  48 79     Min Peak Torque  16 30     Flex/Ext Ratio 3:1 2.6     % variance  normative data (-)74% -66%     % change from initial test N/A visit 1 + 33%        Outcomes:  Intake Score: 52%  Visit 6 Score: 47%  Visit 10 Score:   Discharge Score:  Goal Score: 62%     Standing lumbar range of motion:    Flexion: moderate limitation    Extension: moderate limitation   SG: no pain or limitation    Rotation: no pain or limitation     Repeated extensions: initially improvement but then increase in symptoms. She responded better to standing extensions.     ASSESSMENT     Update:     Arrives with R sided low back pain today. She complete all peripheral machines at beginning of tx session. Medx testing then performed demonstrating  improvement in range of motion and strength since evaluation. Patient has not been here in over a month so POC has been updated today. Baselines demonstrated pain with standing extension and stiffness. She tolerated prone press ups well but she reported worse after second set. She reported no pain with PA mobs so attempted a few repetitions of therapist overpressure but she reported worse after 5 sets. She tolerated standing extensions better. Physical Therapist instructed to remove overpressure / sag at home and continue with only regular press up. She will continue to benefit from skilled Physical Therapist services to address above deficits to return to PLOF without limitations and carry out job duties without difficulty.     Previous Short Term Goals Status:   progressing   New Short Term Goals Status:   progressing   Long Term Goal Status: continue per initial plan of care.  Reasons for Recertification of Therapy:   continuation of R sided low back pain, decreased lumbar range of motion and strength     GOALS    Short term goals:  6 weeks or 10 visits   - Pt will demonstrate increased lumbar MedX ROM by at least 3 degrees from the initial ROM value with improvements noted in functional ROM and ability to perform ADLs. MET 11/25/2024  - Pt will demonstrate increased MedX average isometric strength value by 20 % from initial test resulting in improved ability to perform bending, lifting, and carrying activities safely, confidently. . 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 Appropriate and Ongoing     Long term goals: 10 weeks or 20 visits   - Pt will demonstrate increased lumbar MedX ROM by at least 6  degrees from initial ROM value, resulting in  improved ability to perform functional forward bending while standing and sitting.. MET 11/25/2024  - Pt will demonstrate increased MedX average isometric strength value by 40 % from initial test resulting in improved ability to perform bending, lifting, and carrying activities safely and confidently. Appropriate and Ongoing  - Pt to demonstrate ability to independently control and reduce their pain through posture positioning and mechanical movements throughout a typical day. Appropriate and Ongoing  - Pt will demonstrate reduced pain and improved functional outcomes as reported on the FOTO by reaching an intake score of >/= 62% functional ability in order to demonstrate subjective improvement in patient's condition. . Appropriate and Ongoing  - Pt will demonstrate independence with the HEP at discharge. Appropriate and Ongoing  - Patient to be able to work a full shift as a  without significant exacerbation of her symptoms. (patient goal) discharged, she is no longer  and is currently a      New goals:     In 3 weeks,   Pt will demonstrate increased lumbar MedX ROM by at least 6  degrees from initial ROM value, resulting in improved ability to perform functional forward bending while standing and sitting.  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     - Pt will demonstrate reduced pain and improved functional outcomes as reported on the FOTO by reaching an intake score of >/= 62% functional ability in order to demonstrate subjective improvement in patient's condition.      - Pt will demonstrate independence with the HEP at discharge.     PLAN     Updated Certification Period: 11/25/2024 to 2/25/2024   Recommended Treatment Plan: 2 times per week for 6 weeks:  Gait Training, Manual Therapy, Moist Heat/ Ice, Neuromuscular Re-ed, Patient Education, Therapeutic Activities, and Therapeutic Exercise  Other Recommendations: none     Sonja Riggs, PT

## 2024-11-25 NOTE — PROGRESS NOTES
[facility]  Physical Therapy Treatment Note     Name: Jane Garcia  Clinic Number: [id number]    Therapy Diagnosis:   Encounter Diagnoses   Name Primary?    Decreased strength Yes    Decreased functional mobility      Physician: Barbara Burks FNP-C    Visit Date: 11/25/2024    Physician: Barbara Burks FNP-C     Physician Orders: PT Eval and Treat  Medical Diagnosis from Referral:   Acute right-sided thoracic back pain  - Primary    M54.6 724.1   Chronic left-sided low back pain with left-sided sciatica    M54.42  G89.29        Evaluation Date: 8/13/2024  Authorization Period Expiration: 07-  Plan of Care Expiration: 10/22/2024 - see updated POC  Reassessment Due: 9/13/2024  Visit # / Visits authorized: 6 /10; 5 HB  MedX Testing:MedX testing visit 2    PTA Visit #: 0/5     Time In: 805 AM  Time Out: 910 AM  Total Billable Time: 60 minutes  INSURANCE and OUTCOMES: Fee for Service with FOTO Outcomes 1/3    Precautions: standard    Pattern of pain determined: 1 PEP    Subjective     Jane Garcia reports she stopped coming to therapy due to work but she needs to come and take care of her back.     Patient reports tolerating previous visit very well.  Had soreness, not reproduction of her pain after initial visit.   Patient reports their pain to be 0/10 on a 0-10 scale with 0 being no pain and 10 being the worst pain imaginable.    Pain Location: broad low back      Work and leisure:   Occupation:   Leisure: dance, scrapbooking        Pt goals: relief of her symptoms     Objective      Lumbar  Isometric Testing on Med X equipment: Testing administered by PT    Test Initial Baseline Midpoint Final   Date 1988 11/25/2024    ROM  0-42deg 0-54    Max Peak Torque  48 79    Min Peak Torque  16 30    Flex/Ext Ratio 3:1 2.6    % variance  normative data (-)74% -66%    % change from initial test N/A visit 1 + 33%      Outcomes:  Intake  Score: 52%  Visit 6 Score: 47%  Visit 10 Score:   Discharge Score:  Goal Score: 62%         Treatment     Jane received the treatments listed below:      Physical Therapy technician assisted with treatment under direct supervision of treating therapist. Patient received 1:1 treatments for 30 minutes with Physical Therapist.     ++All charges filed per Medicaid Guidelines++    Jane participated in therapeutic exercises to develop strength, endurance, ROM, flexibility, posture, and core stabilization including: Peripheral muscle strengthening using the Precor machines x 60 minutes         11/25/2024     8:42 AM   [facility]   Visit Number 5   VAS Pain Rating 0   Lumbar Flexion 54   Lumbar Extension 0   Lumbar Peak Torque 79 ft. lbs.   Min Torque 30   Test Percent Below Normative Data 66 %   Test Percent Gain in Strength from Initial  33 %     Peripheral muscle strengthening which included 1 set of 15-20 repetitions at a slow, controlled 10-13 second per rep pace focused on strengthening supporting musculature for improved body mechanics and functional mobility.  Pt and therapist focused on proper form during treatment to ensure optimal strengthening of each targeted muscle group.  Machines were utilized including torso rotation, chest press, rowing, biceps, and triceps. Leg extension, leg curl, hip abd, hip add, and leg press.    Standing baseline:    Flexion: moderate limitation   Extension: moderate limitation, pain and stiffness    SG: no limitation or pain    Rotation: R rotation feels tight     Prone press up 2 x 10 with sag decrease better after first set   Reporting increased pain after second set   Bridging 10 repetitions, ceased reporting her back felt aggravated after doing the second set of prone press ups   Returned to prone PA mobs grade 3 performed with decrease better   Returned to prone press ups therapist overpressure 5 repetitions, initially better then increased   Prone hip extension  2 x 10   Standing extensions over edge of mat 10 repetitions, better     Instructed to remove sag and just regular press up    NP:   Supine  Bridging, cueing for muscle activation and recruitment, green theraband 2 x 10 reps   1/2 bridging, left and right x 10 reps each  Double knee to chest x 10 reps after (prone press ups with sag)  Transverse abdominal sets x 10 reps with physioball   Lower trunk rotations, left and right, x 10 each    Sidelying   Clams, red theraband, left and right, 2 x 10 each  Sidelying PNF posterior dep, manual cues for technique, left and right x 10 each (not performed)  Prone  Hip extension, left and right  x 10 reps   Glute maximum raises, left and right x 10 reps - NP  Press ups x 10 reps    Decrease better with standing extension stiffness and L SG , no effect on R rotation pain  Press ups with sag x 10 repetitions   Decrease better with R rotational pain in standing   Quadruped:   Bird/dog 5 repetitions each side 3 second hold        Patient Education and Home Exercises     Home exercises include:  Extension in stance, or prone press up     Cardio program (V5): - ongoing  Lifting education (V11): - TBD  Posture/Lumbar roll: recommended  Frie Magnet Discharge handout (date given): - TBD  Equipment at home/gym membership: yes - doesn't use    Education provided:   - PT role and Plan of Care   - Home exercise program     Written Home Exercises Provided: Patient instructed to cont prior HEP.  Exercises were reviewed and Jane was able to demonstrate them prior to the end of the session.  Jane demonstrated good  understanding of the education provided.     See EMR under Patient Instructions for exercises provided prior visit.    Assessment     Arrives with R sided low back pain today. She complete all peripheral machines at beginning of tx session. Medx testing then performed demonstrating improvement in range of motion and strength since evaluation. Patient has not been here in over  a month so POC has been updated today. Baselines demonstrated pain with standing extension and stiffness. She tolerated prone press ups well but she reported worse after second set. She reported no pain with PA mobs so attempted a few repetitions of therapist overpressure but she reported worse after 5 sets. She tolerated standing extensions better. Physical Therapist instructed to remove overpressure / sag at home and continue with only regular press up.     Patient is making good progress towards established goals.    Pt will continue to benefit from skilled outpatient physical therapy to address the deficits stated in the impairment chart, provide pt/family education and to maximize pt's level of independence in the home and community environment.     Anticipated Barriers for therapy: chronicity of her symptoms.  Pt's spiritual, cultural and educational needs considered and pt agreeable to plan of care and goals as stated below:     Goals: see updated POC   Plan      See updated POC.     Sonaj Riggs, PT  11/25/2024

## 2024-12-03 ENCOUNTER — PATIENT OUTREACH (OUTPATIENT)
Dept: ADMINISTRATIVE | Facility: HOSPITAL | Age: 34
End: 2024-12-03
Payer: MEDICAID

## 2024-12-03 NOTE — PROGRESS NOTES
"[facility]  Physical Therapy Treatment Note     Name: Jane Garcia  Clinic Number: [id number]    Therapy Diagnosis:   Encounter Diagnoses   Name Primary?    Decreased strength Yes    Decreased functional mobility        Physician: Barbara Burks FNP-C    Visit Date: 12/4/2024    Physician: Barbara Burks FNP-C     Physician Orders: PT Eval and Treat  Medical Diagnosis from Referral:   Acute right-sided thoracic back pain  - Primary    M54.6 724.1   Chronic left-sided low back pain with left-sided sciatica    M54.42  G89.29        Evaluation Date: 8/13/2024  Authorization Period Expiration: 07-  Plan of Care Expiration: 10/22/2024 - see updated POC  Reassessment Due: 9/13/2024  Visit # / Visits authorized: 7 /10; 6 HB  MedX Testing:MedX testing visit 2    PTA Visit #: 1/5     Time In: 0800  Time Out: 0854  Total Billable Time: 54 minutes  INSURANCE and OUTCOMES: Fee for Service with FOTO Outcomes 1/3    Precautions: standard    Pattern of pain determined: 1 PEP    Subjective     Jane Garcia reports "I woke up and I had some pain going on"  She states she has difficulty sleeping.  She does use pillows in between her legs an behind her back when sleeping.      Patient reports tolerating previous visit very well.  Had soreness, not reproduction of her pain after initial visit.   Patient reports their pain to be 4/10 on a 0-10 scale with 0 being no pain and 10 being the worst pain imaginable.    Pain Location: broad low back      Work and leisure:   Occupation:   Leisure: dance, scrapbooking        Pt goals: relief of her symptoms     Objective      Lumbar  Isometric Testing on Med X equipment: Testing administered by PT    Test Initial Baseline Midpoint Final   Date 1990 11/25/2024    ROM  0-42deg 0-54    Max Peak Torque  48 79    Min Peak Torque  16 30    Flex/Ext Ratio 3:1 2.6    % variance  normative data (-)74% -66%    % " change from initial test N/A visit 1 + 33%      Outcomes:  Intake Score: 52%  Visit 6 Score: 47%  Visit 10 Score:   Discharge Score:  Goal Score: 62%         Treatment     Jane received the treatments listed below:      ++All charges filed per Medicaid Guidelines++    Jane participated in therapeutic exercises to develop strength, endurance, ROM, flexibility, posture, and core stabilization including: Peripheral muscle strengthening using the Precor machines x 54 minutes         12/4/2024     1:08 PM   [facility]   Visit Number 6   Treadmill Time (in min.) 3 min   Speed 2.5 mph   Extension in Lying 20   Extension in Standing 20   Lumbar Weight 45 lbs   Repetitions 20   Rating of Perceived Exertion 3      Peripheral muscle strengthening which included 1 set of 15-20 repetitions at a slow, controlled 10-13 second per rep pace focused on strengthening supporting musculature for improved body mechanics and functional mobility.  Pt and therapist focused on proper form during treatment to ensure optimal strengthening of each targeted muscle group.  Machines were utilized including torso rotation, chest press, rowing, biceps, and triceps. Leg extension, leg curl, hip abd, hip add, and leg press.    Standing baseline:    Flexion: stiffness   Extension: no pain   SG: no limitation or pain    Rotation: R rotation discomfort     Bridging x 10 reps   Sidelying Clamshells red theraband, left and right, 2 x 10 each  Sidelying open books x 10 reps   Prone press up 2 x 10 reps   Prone hip extension 2 x 10 reps   Standing extensions over edge of mat 2 x 10 repetitions, better         NP:   Supine  Bridging, cueing for muscle activation and recruitment, green theraband 2 x 10 reps   1/2 bridging, left and right x 10 reps each  Double knee to chest x 10 reps after (prone press ups with sag)  Transverse abdominal sets x 10 reps with physioball   Lower trunk rotations, left and right, x 10 each    Sidelying   Sidelying  PNF posterior dep, manual cues for technique, left and right x 10 each (not performed)  Prone  Glute maximum raises, left and right x 10 reps - NP  Press ups with sag x 10 repetitions  Quadruped:   Bird/dog 5 repetitions each side 3 second hold        Patient Education and Home Exercises     Home exercises include:  Extension in stance, or prone press up     Cardio program (V5): - ongoing  Lifting education (V11): - TBD  Posture/Lumbar roll: recommended  Fridge Magnet Discharge handout (date given): - TBD  Equipment at home/gym membership: yes - doesn't use    Education provided:   - PT role and Plan of Care   - Home exercise program     Written Home Exercises Provided: Patient instructed to cont prior HEP.  Exercises were reviewed and Jane was able to demonstrate them prior to the end of the session.  Kikopo demonstrated good  understanding of the education provided.     See EMR under Patient Instructions for exercises provided prior visit.    Assessment     Jane arrives with Right sided low back pain today.  She performed bird/dog exercises with Contact Guard Assist/min A due to balance deficits in quadriped.  No pain increases reported with all other exercises.      Patient is making good progress towards established goals.    Pt will continue to benefit from skilled outpatient physical therapy to address the deficits stated in the impairment chart, provide pt/family education and to maximize pt's level of independence in the home and community environment.     Anticipated Barriers for therapy: chronicity of her symptoms.  Pt's spiritual, cultural and educational needs considered and pt agreeable to plan of care and goals as stated below:     Goals: see updated POC   Plan      See updated Plan of Care .     Yolie Ag, PTA  12/4/2024

## 2024-12-04 ENCOUNTER — CLINICAL SUPPORT (OUTPATIENT)
Dept: REHABILITATION | Facility: HOSPITAL | Age: 34
End: 2024-12-04
Payer: MEDICAID

## 2024-12-04 DIAGNOSIS — R26.89 DECREASED FUNCTIONAL MOBILITY: ICD-10-CM

## 2024-12-04 DIAGNOSIS — R53.1 DECREASED STRENGTH: Primary | ICD-10-CM

## 2024-12-04 PROCEDURE — 97110 THERAPEUTIC EXERCISES: CPT | Mod: PN,CQ

## 2024-12-06 ENCOUNTER — DOCUMENTATION ONLY (OUTPATIENT)
Dept: REHABILITATION | Facility: HOSPITAL | Age: 34
End: 2024-12-06
Payer: MEDICAID

## 2024-12-06 NOTE — PROGRESS NOTES
PT/PTA met face to face to discuss pt's treatment plan and progress towards established goals. Pt will be seen by a physical therapist minimally every 6th visit or every 30 days.      Yolie Ag PTA

## 2024-12-16 ENCOUNTER — OFFICE VISIT (OUTPATIENT)
Dept: URGENT CARE | Facility: CLINIC | Age: 34
End: 2024-12-16
Payer: MEDICAID

## 2024-12-16 VITALS
RESPIRATION RATE: 17 BRPM | HEART RATE: 119 BPM | WEIGHT: 179 LBS | DIASTOLIC BLOOD PRESSURE: 95 MMHG | TEMPERATURE: 101 F | BODY MASS INDEX: 28.09 KG/M2 | SYSTOLIC BLOOD PRESSURE: 148 MMHG | OXYGEN SATURATION: 98 % | HEIGHT: 67 IN

## 2024-12-16 DIAGNOSIS — R09.81 NASAL CONGESTION: ICD-10-CM

## 2024-12-16 DIAGNOSIS — B37.9 YEAST INFECTION: ICD-10-CM

## 2024-12-16 DIAGNOSIS — B95.2 UTI (URINARY TRACT INFECTION) DUE TO ENTEROCOCCUS: Primary | ICD-10-CM

## 2024-12-16 DIAGNOSIS — H66.001 ACUTE SUPPURATIVE OTITIS MEDIA OF RIGHT EAR WITHOUT SPONTANEOUS RUPTURE OF TYMPANIC MEMBRANE, RECURRENCE NOT SPECIFIED: ICD-10-CM

## 2024-12-16 DIAGNOSIS — R30.0 DYSURIA: ICD-10-CM

## 2024-12-16 DIAGNOSIS — J10.1 INFLUENZA A: ICD-10-CM

## 2024-12-16 DIAGNOSIS — N39.0 UTI (URINARY TRACT INFECTION) DUE TO ENTEROCOCCUS: Primary | ICD-10-CM

## 2024-12-16 LAB
B-HCG UR QL: NEGATIVE
BILIRUB UR QL STRIP: POSITIVE
CTP QC/QA: YES
FLUAV AG NPH QL: POSITIVE
FLUBV AG NPH QL: NEGATIVE
GLUCOSE UR QL STRIP: NEGATIVE
KETONES UR QL STRIP: NEGATIVE
LEUKOCYTE ESTERASE UR QL STRIP: POSITIVE
PH, POC UA: 6
POC BLOOD, URINE: NEGATIVE
POC NITRATES, URINE: POSITIVE
PROT UR QL STRIP: NEGATIVE
SARS-COV-2 AG RESP QL IA.RAPID: NEGATIVE
SP GR UR STRIP: 1.01 (ref 1–1.03)
UROBILINOGEN UR STRIP-ACNC: 1 (ref 0.1–1.1)

## 2024-12-16 PROCEDURE — 99213 OFFICE O/P EST LOW 20 MIN: CPT | Mod: S$GLB,,, | Performed by: NURSE PRACTITIONER

## 2024-12-16 PROCEDURE — 87804 INFLUENZA ASSAY W/OPTIC: CPT | Mod: QW,,, | Performed by: NURSE PRACTITIONER

## 2024-12-16 PROCEDURE — 81003 URINALYSIS AUTO W/O SCOPE: CPT | Mod: QW,S$GLB,, | Performed by: NURSE PRACTITIONER

## 2024-12-16 PROCEDURE — 87811 SARS-COV-2 COVID19 W/OPTIC: CPT | Mod: QW,S$GLB,, | Performed by: NURSE PRACTITIONER

## 2024-12-16 PROCEDURE — 81025 URINE PREGNANCY TEST: CPT | Mod: S$GLB,,, | Performed by: NURSE PRACTITIONER

## 2024-12-16 RX ORDER — NITROFURANTOIN 25; 75 MG/1; MG/1
100 CAPSULE ORAL 2 TIMES DAILY
Qty: 10 CAPSULE | Refills: 0 | Status: SHIPPED | OUTPATIENT
Start: 2024-12-16 | End: 2024-12-16

## 2024-12-16 RX ORDER — ACETAMINOPHEN 500 MG
1000 TABLET ORAL
Status: COMPLETED | OUTPATIENT
Start: 2024-12-16 | End: 2024-12-16

## 2024-12-16 RX ORDER — OSELTAMIVIR PHOSPHATE 75 MG/1
75 CAPSULE ORAL DAILY
Qty: 5 CAPSULE | Refills: 0 | Status: SHIPPED | OUTPATIENT
Start: 2024-12-16 | End: 2024-12-21

## 2024-12-16 RX ORDER — AMOXICILLIN AND CLAVULANATE POTASSIUM 875; 125 MG/1; MG/1
1 TABLET, FILM COATED ORAL EVERY 12 HOURS
Qty: 14 TABLET | Refills: 0 | Status: SHIPPED | OUTPATIENT
Start: 2024-12-16 | End: 2024-12-23

## 2024-12-16 RX ORDER — FLUCONAZOLE 150 MG/1
150 TABLET ORAL DAILY
Qty: 1 TABLET | Refills: 0 | Status: SHIPPED | OUTPATIENT
Start: 2024-12-16 | End: 2024-12-17

## 2024-12-16 RX ADMIN — Medication 1000 MG: at 01:12

## 2024-12-16 NOTE — PROGRESS NOTES
"Subjective:      Patient ID: Jane Garcia is a 34 y.o. female.    Vitals:  height is 5' 7" (1.702 m) and weight is 81.2 kg (179 lb). Her oral temperature is 101.1 °F (38.4 °C) (abnormal). Her blood pressure is 148/95 (abnormal) and her pulse is 119 (abnormal). Her respiration is 17 and oxygen saturation is 98%.     Chief Complaint: Urinary Tract Infection (/) and URI    Patient presents with dysuria, fever and nasal congestion that started today.  Patient reports pain described as burning that occurs when the urination and states past history of UTIs.  She reports symptoms feel similar.  She states she has been experiencing nasal congestion.  Patient states symptoms started this morning at 5:00 a.m..  She also reports associated right ear pain and denies decreased hearing.    Urinary Tract Infection   This is a new problem. The current episode started today. The problem occurs every urination. The problem has been unchanged. The quality of the pain is described as burning. The pain is at a severity of 5/10. There has been no fever. She is Sexually active. There is No history of pyelonephritis. Associated symptoms include frequency. Treatments tried: AZO.   URI   This is a new problem. The current episode started today. The problem has been unchanged. There has been no fever. The cough is Non-productive. Associated symptoms include congestion and dysuria. She has tried nothing for the symptoms.       HENT:  Positive for congestion and postnasal drip.    Genitourinary:  Positive for dysuria and frequency.      Objective:     Physical Exam   Constitutional: She is oriented to person, place, and time. She appears well-developed. She is cooperative.  Non-toxic appearance. She appears ill. No distress.   HENT:   Head: Normocephalic and atraumatic.   Ears:   Right Ear: Hearing and external ear normal. There is tenderness. Tympanic membrane is erythematous.   Left Ear: Hearing, tympanic membrane, external ear and ear " canal normal.   Nose: Nose normal. No mucosal edema, rhinorrhea or nasal deformity. No epistaxis. Right sinus exhibits no maxillary sinus tenderness and no frontal sinus tenderness. Left sinus exhibits no maxillary sinus tenderness and no frontal sinus tenderness.   Mouth/Throat: Uvula is midline, oropharynx is clear and moist and mucous membranes are normal. No trismus in the jaw. Normal dentition. No uvula swelling. No oropharyngeal exudate, posterior oropharyngeal edema or posterior oropharyngeal erythema.   Eyes: Conjunctivae and lids are normal. No scleral icterus.   Neck: Trachea normal and phonation normal. Neck supple. No edema present. No erythema present. No neck rigidity present.   Cardiovascular: Regular rhythm, normal heart sounds and normal pulses. Tachycardia present.   Pulmonary/Chest: Effort normal and breath sounds normal. No respiratory distress. She has no decreased breath sounds. She has no rhonchi.   Abdominal: Normal appearance.   Musculoskeletal: Normal range of motion.         General: No deformity. Normal range of motion.   Neurological: She is alert and oriented to person, place, and time. She exhibits normal muscle tone. Coordination normal.   Skin: Skin is warm, dry, intact, not diaphoretic and not pale.   Psychiatric: Her speech is normal and behavior is normal. Judgment and thought content normal.   Nursing note and vitals reviewed.      Assessment:     1. UTI (urinary tract infection) due to Enterococcus    2. Dysuria    3. Nasal congestion    4. Influenza A    5. Acute suppurative otitis media of right ear without spontaneous rupture of tympanic membrane, recurrence not specified        Plan:       UTI (urinary tract infection) due to Enterococcus  -     Discontinue: nitrofurantoin, macrocrystal-monohydrate, (MACROBID) 100 MG capsule; Take 1 capsule (100 mg total) by mouth 2 (two) times daily. for 5 days  Dispense: 10 capsule; Refill: 0  -     amoxicillin-clavulanate 875-125mg  (AUGMENTIN) 875-125 mg per tablet; Take 1 tablet by mouth every 12 (twelve) hours. for 7 days  Dispense: 14 tablet; Refill: 0    Dysuria  -     POCT Urinalysis, Dipstick, Automated, W/O Scope  -     POCT urine pregnancy    Nasal congestion  -     POCT Influenza A/B Rapid Antigen  -     SARS Coronavirus 2 Antigen, POCT Manual Read    Influenza A  -     oseltamivir (TAMIFLU) 75 MG capsule; Take 1 capsule (75 mg total) by mouth once daily. for 5 days  Dispense: 5 capsule; Refill: 0  -     acetaminophen tablet 1,000 mg    Acute suppurative otitis media of right ear without spontaneous rupture of tympanic membrane, recurrence not specified  -     amoxicillin-clavulanate 875-125mg (AUGMENTIN) 875-125 mg per tablet; Take 1 tablet by mouth every 12 (twelve) hours. for 7 days  Dispense: 14 tablet; Refill: 0      Results for orders placed or performed in visit on 12/16/24   POCT Influenza A/B Rapid Antigen    Collection Time: 12/16/24  1:08 PM   Result Value Ref Range    Rapid Influenza A Ag Positive (A) Negative    Rapid Influenza B Ag Negative Negative     Acceptable Yes    SARS Coronavirus 2 Antigen, POCT Manual Read    Collection Time: 12/16/24  1:08 PM   Result Value Ref Range    SARS Coronavirus 2 Antigen Negative Negative     Acceptable Yes    POCT Urinalysis, Dipstick, Automated, W/O Scope    Collection Time: 12/16/24  1:08 PM   Result Value Ref Range    POC Blood, Urine Negative Negative    POC Bilirubin, Urine Positive (A) Negative    POC Urobilinogen, Urine 1.0 0.1 - 1.1    POC Ketones, Urine Negative Negative    POC Protein, Urine Negative Negative    POC Nitrates, Urine Positive (A) Negative    POC Glucose, Urine Negative Negative    pH, UA 6.0     POC Specific Gravity, Urine 1.015 1.003 - 1.029    POC Leukocytes, Urine Positive (A) Negative   POCT urine pregnancy    Collection Time: 12/16/24  1:10 PM   Result Value Ref Range    POC Preg Test, Ur Negative Negative      Acceptable Yes        Augmentin for UTI & AOM    Patient prone to yeast infections after abx- diflucan after completion of abx    Tamiflu for influenza A    Follow up with PCP

## 2024-12-16 NOTE — LETTER
December 16, 2024      [facility]  [address]  [facility] [address]  Phone: [phone]       Patient: Jane Garcia   YOB: 1990  Date of Visit: 12/16/2024    To Whom It May Concern:    Chu Garcia  was at [facility] on 12/16/2024. The patient may return to work/school on 12/20/24 with no restrictions. If you have any questions or concerns, or if I can be of further assistance, please do not hesitate to contact me.    Sincerely,    Genna Clemente NP

## 2024-12-31 ENCOUNTER — CLINICAL SUPPORT (OUTPATIENT)
Dept: REHABILITATION | Facility: HOSPITAL | Age: 34
End: 2024-12-31
Payer: MEDICAID

## 2024-12-31 DIAGNOSIS — R26.89 DECREASED FUNCTIONAL MOBILITY: ICD-10-CM

## 2024-12-31 DIAGNOSIS — R53.1 DECREASED STRENGTH: Primary | ICD-10-CM

## 2024-12-31 PROCEDURE — 97110 THERAPEUTIC EXERCISES: CPT | Mod: PN

## 2024-12-31 NOTE — PROGRESS NOTES
[facility]  Physical Therapy Treatment Note     Name: Jane Garcia  Clinic Number: [id number]    Therapy Diagnosis:   No diagnosis found.      Physician: Barbara Burks FNP-C    Visit Date: 12/31/2024    Physician: Barbara Burks FNP-C     Physician Orders: PT Eval and Treat  Medical Diagnosis from Referral:   Acute right-sided thoracic back pain  - Primary    M54.6 724.1   Chronic left-sided low back pain with left-sided sciatica    M54.42  G89.29        Evaluation Date: 8/13/2024  Authorization Period Expiration: 07-  Plan of Care Expiration: 2/25/2024  Reassessment Due: 9/13/2024  Visit # / Visits authorized: 8 /10; 6 HB  MedX Testing:MedX testing visit 2    PTA Visit #: 0/5     Time In: 1400  Time Out: 1500  Total Billable Time: 60 minutes  INSURANCE and OUTCOMES: Fee for Service with FOTO Outcomes 1/3    Precautions: standard    Pattern of pain determined: 1 PEP    Subjective     Jane Garcia reports she did wall yoga and she is sore. The back continues to bother her. She was sick with flu and ear infections.     Patient reports tolerating previous visit very well.  Had soreness, not reproduction of her pain after initial visit.   Patient reports their pain to be 4/10 on a 0-10 scale with 0 being no pain and 10 being the worst pain imaginable.    Pain Location: broad low back      Work and leisure:   Occupation:   Leisure: dance, scrapbooking        Pt goals: relief of her symptoms     Objective      Lumbar  Isometric Testing on Med X equipment: Testing administered by PT    Test Initial Baseline Midpoint Final   Date 1990 11/25/2024    ROM  0-42deg 0-54    Max Peak Torque  48 79    Min Peak Torque  16 30    Flex/Ext Ratio 3:1 2.6    % variance  normative data (-)74% -66%    % change from initial test N/A visit 1 + 33%      Outcomes:  Intake Score: 52%  Visit 6 Score: 47%  Visit 10 Score:   Discharge Score:  Goal Score:  62%         Treatment     Jane received the treatments listed below:      Physical Therapy technician assisted with treatment under direct supervision of treating therapist. Patient received 1:1 treatments for 22 minutes with Physical Therapist.     ++All charges filed per Medicaid Guidelines++    Jane participated in therapeutic exercises to develop strength, endurance, ROM, flexibility, posture, and core stabilization including: Peripheral muscle strengthening using the Precor machines x 60 minutes         12/31/2024     3:15 PM   [facility]   Visit Number 7   Lumbar Weight 50 lbs   Repetitions 20   Rating of Perceived Exertion 3      Peripheral muscle strengthening which included 1 set of 15-20 repetitions at a slow, controlled 10-13 second per rep pace focused on strengthening supporting musculature for improved body mechanics and functional mobility.  Pt and therapist focused on proper form during treatment to ensure optimal strengthening of each targeted muscle group.  Machines were utilized including torso rotation, chest press, rowing, biceps, and triceps. Leg extension, leg curl, hip abd, hip add, and leg press.    Standing baseline:    Flexion: stiffness   Extension: stiffness    SG: no limitation or pain     Bridging x 20 reps   Sidelying Clamshells green theraband, left and right, 2 x 10 each  Sidelying open books x 15 reps   Prone press up 2 x 10 repetitions pre and post mat exercises with sag    Prone hip extension 2 x 10 reps   + side steps red theraband 3 steps x 10 rounds each way   Bird/dog 2 x 5 repetitions each side     Patient Education and Home Exercises     Home exercises include:  Extension in stance, or prone press up     Cardio program (V5): - ongoing  Lifting education (V11): - TBD  Posture/Lumbar roll: recommended  Fridge Magnet Discharge handout (date given): - TBD  Equipment at home/gym membership: yes - doesn't use    Education provided:   - PT role and Plan of Care   -  Home exercise program     Written Home Exercises Provided: Patient instructed to cont prior HEP.  Exercises were reviewed and Jane was able to demonstrate them prior to the end of the session.  Jane demonstrated good  understanding of the education provided.     See EMR under Patient Instructions for exercises provided prior visit.    Assessment     Continues to report back pain. She reported decreased stiffness following prone press ups. She continues to have less stability when WB through LLE when performing bird/dogs.she also reports fatigue with bird/dogs. Medx increased by 10% with good tolerance at 3/10 RPE.     Patient is making good progress towards established goals.    Pt will continue to benefit from skilled outpatient physical therapy to address the deficits stated in the impairment chart, provide pt/family education and to maximize pt's level of independence in the home and community environment.     Anticipated Barriers for therapy: chronicity of her symptoms.  Pt's spiritual, cultural and educational needs considered and pt agreeable to plan of care and goals as stated below:     Goals:     In 3 weeks, progressing 12/31/2024    Pt will demonstrate increased lumbar MedX ROM by at least 6  degrees from initial ROM value, resulting in improved ability to perform functional forward bending while standing and sitting.  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      - Pt will demonstrate reduced pain and improved functional outcomes as reported on the FOTO by  reaching an intake score of >/= 62% functional ability in order to demonstrate subjective improvement in patient's condition.      - Pt will demonstrate independence with the HEP at discharge.   Plan      Progress as tolerated.     Sonja Riggs, PT  12/31/2024

## 2025-02-03 ENCOUNTER — OFFICE VISIT (OUTPATIENT)
Dept: FAMILY MEDICINE | Facility: CLINIC | Age: 35
End: 2025-02-03
Payer: MEDICAID

## 2025-02-03 VITALS
WEIGHT: 183 LBS | SYSTOLIC BLOOD PRESSURE: 130 MMHG | BODY MASS INDEX: 28.66 KG/M2 | HEART RATE: 67 BPM | DIASTOLIC BLOOD PRESSURE: 88 MMHG | OXYGEN SATURATION: 98 % | TEMPERATURE: 98 F

## 2025-02-03 DIAGNOSIS — H69.91 DYSFUNCTION OF RIGHT EUSTACHIAN TUBE: ICD-10-CM

## 2025-02-03 DIAGNOSIS — H92.01 OTALGIA OF RIGHT EAR: Primary | ICD-10-CM

## 2025-02-03 PROCEDURE — 99213 OFFICE O/P EST LOW 20 MIN: CPT | Mod: PBBFAC,PN | Performed by: NURSE PRACTITIONER

## 2025-02-03 PROCEDURE — 99999 PR PBB SHADOW E&M-EST. PATIENT-LVL III: CPT | Mod: PBBFAC,,, | Performed by: NURSE PRACTITIONER

## 2025-02-03 PROCEDURE — 99214 OFFICE O/P EST MOD 30 MIN: CPT | Mod: S$PBB,,, | Performed by: NURSE PRACTITIONER

## 2025-02-03 PROCEDURE — 1159F MED LIST DOCD IN RCRD: CPT | Mod: CPTII,,, | Performed by: NURSE PRACTITIONER

## 2025-02-03 PROCEDURE — 3008F BODY MASS INDEX DOCD: CPT | Mod: CPTII,,, | Performed by: NURSE PRACTITIONER

## 2025-02-03 PROCEDURE — 3075F SYST BP GE 130 - 139MM HG: CPT | Mod: CPTII,,, | Performed by: NURSE PRACTITIONER

## 2025-02-03 PROCEDURE — 3079F DIAST BP 80-89 MM HG: CPT | Mod: CPTII,,, | Performed by: NURSE PRACTITIONER

## 2025-02-03 RX ORDER — MINERAL OIL
180 ENEMA (ML) RECTAL DAILY
Qty: 30 TABLET | Refills: 3 | Status: SHIPPED | OUTPATIENT
Start: 2025-02-03 | End: 2026-02-03

## 2025-02-03 RX ORDER — AZELASTINE 1 MG/ML
1 SPRAY, METERED NASAL 2 TIMES DAILY
Qty: 30 ML | Refills: 2 | Status: SHIPPED | OUTPATIENT
Start: 2025-02-03 | End: 2026-02-03

## 2025-02-03 RX ORDER — ESOMEPRAZOLE MAGNESIUM 40 MG/1
40 CAPSULE, DELAYED RELEASE ORAL
Qty: 30 CAPSULE | Refills: 3 | Status: SHIPPED | OUTPATIENT
Start: 2025-02-03 | End: 2026-02-03

## 2025-02-03 NOTE — PROGRESS NOTES
This dictation has been generated using Modal Fluency Dictation some phonetic errors may occur. Please contact author for clarification if needed.     1. Otalgia of right ear    2. Dysfunction of right eustachian tube    Other orders  -     fexofenadine (ALLEGRA) 180 MG tablet; Take 1 tablet (180 mg total) by mouth once daily.  Dispense: 30 tablet; Refill: 3  -     azelastine (ASTELIN) 137 mcg (0.1 %) nasal spray; 1 spray (137 mcg total) by Nasal route 2 (two) times daily.  Dispense: 30 mL; Refill: 2  -     esomeprazole (NEXIUM) 40 MG capsule; Take 1 capsule (40 mg total) by mouth before breakfast.  Dispense: 30 capsule; Refill: 3         Right ear pain Eustachian tube dysfunction recommended Allegra Astelin.  Better control of GERD with Nexium discontinue Protonix.  Follow-up 2 weeks as needed may need to see ENT again.  In excessive of 30 minutes total time spent for evaluation and management services. Time included elements of the following: time spent preparing to see patient, obtaining and reviewing separately obtained history, exam, evaluation, counseling and education of patient/family member or care giver, documenting in the HMR, independently interpreting results and communication of results, coordination of care ordering medications, tests, or procedures, referral and communication to other health care professionals.    No follow-ups on file.    ________________________________________________________________  ________________________________________________________________      Chief Complaint   Patient presents with    Otalgia     History of present illness    This 34 y.o. presents today for complaint of   Right ear pain.  Patient notes this has been a problem in the last year.  Symptoms present off and on.  Has had some urgent care visits an antibiotic is which at times was helpful.  It was reported that time she had redness and bubbles on the eardrum.  Currently without fever chills.  Notes that she  saw ENT already and recommendation for reflux control after laryngeal scope noted changes.  Patient has taken Protonix since August.  She has had episodes of ear pain and urgent care visits since August.  Currently without fever.  No sore throat.  Does have postnasal drip.  Currently taking Zyrtec which is taken for more than 4 months.      Past Medical History:   Diagnosis Date    Allergy     Anxiety        Past Surgical History:   Procedure Laterality Date    HERNIA REPAIR      LAPAROSCOPIC CHOLECYSTECTOMY N/A 5/23/2022    Procedure: CHOLECYSTECTOMY, LAPAROSCOPIC;  Surgeon: Garth Hurley III, MD;  Location: [facility];  Service: General;  Laterality: N/A;       Family History   Problem Relation Name Age of Onset    Hypertension Mother      Hypertension Father      Hypertension Maternal Aunt      Hypertension Maternal Uncle      No Known Problems Paternal Uncle      Breast cancer Maternal Grandmother      Cancer Maternal Grandmother      Hypertension Maternal Grandmother      Heart disease Maternal Grandfather         Social History     Socioeconomic History    Marital status:    Tobacco Use    Smoking status: Never     Passive exposure: Never    Smokeless tobacco: Never   Substance and Sexual Activity    Alcohol use: Not Currently    Drug use: No    Sexual activity: Yes     Partners: Male   Social History Narrative    ** Merged History Encounter **          Social Drivers of Health     Financial Resource Strain: Medium Risk (7/9/2024)    Overall Financial Resource Strain (CARDIA)     Difficulty of Paying Living Expenses: Somewhat hard   Food Insecurity: No Food Insecurity (7/9/2024)    Hunger Vital Sign     Worried About Running Out of Food in the Last Year: Never true     Ran Out of Food in the Last Year: Never true   Recent Concern: Food Insecurity - Food Insecurity Present (5/7/2024)    Hunger Vital Sign     Worried About Running Out of Food in the Last Year: Sometimes true     Ran Out of Food in the  Last Year: Sometimes true   Transportation Needs: No Transportation Needs (5/7/2024)    PRAPARE - Transportation     Lack of Transportation (Medical): No     Lack of Transportation (Non-Medical): No   Physical Activity: Sufficiently Active (7/9/2024)    Exercise Vital Sign     Days of Exercise per Week: 3 days     Minutes of Exercise per Session: 60 min   Recent Concern: Physical Activity - Insufficiently Active (5/7/2024)    Exercise Vital Sign     Days of Exercise per Week: 3 days     Minutes of Exercise per Session: 30 min   Stress: Stress Concern Present (7/9/2024)    [facility] of Occupational Health - Occupational Stress Questionnaire     Feeling of Stress : To some extent   Housing Stability: High Risk (7/9/2024)    Housing Stability Vital Sign     Unable to Pay for Housing in the Last Year: Yes       Current Outpatient Medications   Medication Sig Dispense Refill    fluticasone propionate (FLONASE) 50 mcg/actuation nasal spray 2 sprays (100 mcg total) by Each Nostril route once daily. 15.8 mL 0    azelastine (ASTELIN) 137 mcg (0.1 %) nasal spray 1 spray (137 mcg total) by Nasal route 2 (two) times daily. 30 mL 2    esomeprazole (NEXIUM) 40 MG capsule Take 1 capsule (40 mg total) by mouth before breakfast. 30 capsule 3    fexofenadine (ALLEGRA) 180 MG tablet Take 1 tablet (180 mg total) by mouth once daily. 30 tablet 3     No current facility-administered medications for this visit.       Review of patient's allergies indicates:  No Known Allergies    Physical examination  Vitals Reviewed  /88 (BP Location: Left arm, Patient Position: Sitting)   Pulse 67   Temp 98.1 °F (36.7 °C) (Oral)   Wt 83 kg (183 lb)   SpO2 98%   BMI 28.66 kg/m²  Body mass index is 28.66 kg/m².     BP Readings from Last 3 Encounters:   02/03/25 130/88   12/16/24 (!) 148/95   07/29/24 128/80       Wt Readings from Last 3 Encounters:   02/03/25 83 kg (183 lb)   12/16/24 81.2 kg (179 lb)   07/29/24 82.3 kg (181 lb 8 oz)      Gen. Well-dressed well-nourished   Skin warm dry and intact.  No rashes noted.  HEENT.  TM intact bilateral with normal light reflex.  No mastoid tenderness during percussion.  Nares patent bilateral.  Pharynx is unremarkable.  No maxillary or frontal sinus tenderness when percussed.    Neck is supple without adenopathy  Chest.  Respirations are even unlabored.  Lungs are clear to auscultation.  Cardiac regular rate and rhythm.  No chest wall adenopathy noted.  Neuro. Awake alert oriented x4.  Normal judgment and cognition noted.  Extremities no clubbing cyanosis or edema noted.     Call or return to clinic prn if these symptoms worsen or fail to improve as anticipated.

## 2025-04-04 ENCOUNTER — HOSPITAL ENCOUNTER (OUTPATIENT)
Dept: RADIOLOGY | Facility: HOSPITAL | Age: 35
Discharge: HOME | End: 2025-04-04
Attending: NURSE PRACTITIONER
Payer: MEDICAID

## 2025-04-04 ENCOUNTER — OFFICE VISIT (OUTPATIENT)
Dept: FAMILY MEDICINE | Facility: CLINIC | Age: 35
End: 2025-04-04
Payer: MEDICAID

## 2025-04-04 ENCOUNTER — RESULTS FOLLOW-UP (OUTPATIENT)
Dept: FAMILY MEDICINE | Facility: CLINIC | Age: 35
End: 2025-04-04

## 2025-04-04 VITALS
SYSTOLIC BLOOD PRESSURE: 128 MMHG | WEIGHT: 186.06 LBS | HEART RATE: 83 BPM | TEMPERATURE: 98 F | BODY MASS INDEX: 29.2 KG/M2 | HEIGHT: 67 IN | DIASTOLIC BLOOD PRESSURE: 86 MMHG

## 2025-04-04 DIAGNOSIS — R06.02 SOB (SHORTNESS OF BREATH): ICD-10-CM

## 2025-04-04 DIAGNOSIS — R05.2 SUBACUTE COUGH: ICD-10-CM

## 2025-04-04 DIAGNOSIS — R05.2 SUBACUTE COUGH: Primary | ICD-10-CM

## 2025-04-04 DIAGNOSIS — K21.9 GASTROESOPHAGEAL REFLUX DISEASE WITHOUT ESOPHAGITIS: ICD-10-CM

## 2025-04-04 PROCEDURE — 71046 X-RAY EXAM CHEST 2 VIEWS: CPT | Mod: TC,PO

## 2025-04-04 PROCEDURE — 99999 PR PBB SHADOW E&M-EST. PATIENT-LVL III: CPT | Mod: PBBFAC,,, | Performed by: NURSE PRACTITIONER

## 2025-04-04 PROCEDURE — 71046 X-RAY EXAM CHEST 2 VIEWS: CPT | Mod: 26,,, | Performed by: RADIOLOGY

## 2025-04-04 PROCEDURE — 99213 OFFICE O/P EST LOW 20 MIN: CPT | Mod: PBBFAC,PN | Performed by: NURSE PRACTITIONER

## 2025-04-04 RX ORDER — ALBUTEROL SULFATE 90 UG/1
2 INHALANT RESPIRATORY (INHALATION) EVERY 6 HOURS PRN
Qty: 18 G | Refills: 0 | Status: SHIPPED | OUTPATIENT
Start: 2025-04-04

## 2025-04-04 RX ORDER — CETIRIZINE HYDROCHLORIDE 10 MG/1
1 TABLET ORAL EVERY MORNING
COMMUNITY
Start: 2024-07-23

## 2025-04-04 RX ORDER — PANTOPRAZOLE SODIUM 40 MG/1
40 TABLET, DELAYED RELEASE ORAL 2 TIMES DAILY
COMMUNITY
Start: 2024-08-20 | End: 2025-04-04 | Stop reason: SDUPTHER

## 2025-04-04 RX ORDER — ALBUTEROL SULFATE 90 UG/1
2 INHALANT RESPIRATORY (INHALATION) EVERY 6 HOURS PRN
COMMUNITY
Start: 2025-01-12 | End: 2025-04-04 | Stop reason: SDUPTHER

## 2025-04-04 RX ORDER — PANTOPRAZOLE SODIUM 40 MG/1
40 TABLET, DELAYED RELEASE ORAL 2 TIMES DAILY
Qty: 180 TABLET | Refills: 1 | Status: SHIPPED | OUTPATIENT
Start: 2025-04-04

## 2025-04-04 NOTE — PROGRESS NOTES
This dictation has been generated using Modal Fluency Dictation some phonetic errors may occur. Please contact author for clarification if needed.     1. Subacute cough  -     Complete PFT w/ bronchodilator; Future  -     X-Ray Chest PA And Lateral; Future; Expected date: 04/04/2025    2. SOB (shortness of breath)  -     Complete PFT w/ bronchodilator; Future  -     X-Ray Chest PA And Lateral; Future; Expected date: 04/04/2025    3. Gastroesophageal reflux disease without esophagitis    Other orders  -     pantoprazole (PROTONIX) 40 MG tablet; Take 1 tablet (40 mg total) by mouth 2 (two) times daily.  Dispense: 180 tablet; Refill: 1       Assessment & Plan    IMPRESSION:  - Considered persistent cough and globus sensation as potentially related to GERD and allergies.  - Assessed shortness of breath following recent COVID-19 infection.  - Considered ENT referral if symptoms do not improve with current treatment plan.    GASTRO-ESOPHAGEAL REFLUX DISEASE:   Explained that reflux can cause throat symptoms, globus sensation, voice weakness, and shortness of breath due to acid irritation of upper airways.   Ms. Garcia reports constant globus sensation in throat and feeling like something's constantly in their throat all day long.   Acknowledged that the symptoms can be related to reflux and that treatment is being approached from both reflux and allergy standpoints.   Increased Protonix dosage to twice daily, to be taken before eating.   Considered that shortness of breath could be related to acid reflux irritating upper airways.   Acknowledged that voice weakness could be related to reflux.   Increased Protonix dosage to twice daily, which may indirectly help with voice symptoms.    POST COVID-19 CONDITION:   Ms. Garcia reports shortness of breath after having COVID-19 1 month ago, which included loss of taste.    SHORTNESS OF BREATH:   Performed physical exam including lung auscultation and deep breathing  exercises.   Noted that lungs are clear and the patient does not have pneumonia.   Ordered spirometry to check the patient's breathing.   Agreed to perform a chest XR upon the patient's request.   Ms. Garcia reports shortness of breath when walking and talking, needing to stop and take breaths.   Performed physical exam including lung auscultation and deep breathing exercises.   Noted that lungs are clear and the patient does not have pneumonia.   Ordered spirometry to check breathing.   Ordered chest XR to alleviate concerns.    SPEECH DISTURBANCES:   Ms. Garcia reports weakness of voice.    FOLLOW-UP:   Considered referral to ENT for further evaluation if symptoms don't improve with current treatment plan.   Considered ENT referral if symptoms do not improve with current treatment plan.         Subacute cough patient desirous of chest x-ray.  Reviewed image no evidence of acute cardiopulmonary pathology.  Proceed with complete   PFT with bronchodilator.  Continue tree GERD symptoms.  Consider referral to ENT if symptoms are not improving.    Shortness a breath normal exam check spirometry age.  Reviewed chest x-ray image.    I will review all results and address accordingly.   Follow up in about 3 weeks (around 4/25/2025).    ________________________________________________________________  ________________________________________________________________      No chief complaint on file.    History of present illness  History of Present Illness    CHIEF COMPLAINT:  Ms. Garcia presents today for persistent cough and globus sensation    GERD:  She reports constant globus sensation in throat throughout the day with associated voice weakness.    POST-COVID SYMPTOMS:  She had COVID infection one month ago with associated loss of taste. 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    MEDICATIONS:  She is currently taking Protonix daily and using Astelin nasal spray.    SOCIAL HISTORY:  She denies history of smoking.      ROS:  ENT: +globulus sensation when swallowing, +voice change, +hoarseness  Respiratory: +cough, +shortness of breath  Neurological: +loss of taste         Past Medical History:   Diagnosis Date    Allergy     Anxiety        Past Surgical History:   Procedure Laterality Date    HERNIA REPAIR      LAPAROSCOPIC CHOLECYSTECTOMY N/A 5/23/2022    Procedure: CHOLECYSTECTOMY, LAPAROSCOPIC;  Surgeon: Garth Hurley III, MD;  Location: [facility];  Service: General;  Laterality: N/A;       Family History   Problem Relation Name Age of Onset    Hypertension Mother      Hypertension Father      Hypertension Maternal Aunt      Hypertension Maternal Uncle      No Known Problems Paternal Uncle      Breast cancer Maternal Grandmother      Cancer Maternal Grandmother      Hypertension Maternal Grandmother      Heart disease Maternal Grandfather         Social History     Socioeconomic History    Marital status:    Tobacco Use    Smoking status: Never     Passive exposure: Never    Smokeless tobacco: Never   Substance and Sexual Activity    Alcohol use: Not Currently    Drug use: No    Sexual activity: Yes     Partners: Male   Social History Narrative    ** Merged History Encounter **          Social Drivers of Health     Financial Resource Strain: Medium Risk (3/24/2025)    Overall Financial Resource Strain (CARDIA)     Difficulty of Paying Living Expenses: Somewhat hard   Food Insecurity: Food Insecurity Present (3/24/2025)    Hunger Vital Sign     Worried About Running Out of Food in the Last Year: Sometimes true     Ran Out of Food in the Last Year: Sometimes true   Transportation Needs: No Transportation Needs (3/24/2025)    PRAPARE - Transportation     Lack of Transportation (Medical): No     Lack of Transportation (Non-Medical): No   Physical Activity: Sufficiently  "Active (3/24/2025)    Exercise Vital Sign     Days of Exercise per Week: 3 days     Minutes of Exercise per Session: 60 min   Stress: No Stress Concern Present (3/24/2025)    [place] of [employer]ational [employer] - Occupational Stress Questionnaire     Feeling of Stress : Only a little   Housing Stability: High Risk (3/24/2025)    Housing Stability Vital Sign     Unable to Pay for Housing in the Last Year: Yes     Homeless in the Last Year: No       Current Medications[1]    Review of patient's allergies indicates:  No Known Allergies    Physical examination  Vitals Reviewed  /86   Pulse 83   Temp 97.8 °F (36.6 °C) (Oral)   Ht 5' 7" (1.702 m)   Wt 84.4 kg (186 lb 1.1 oz)   SpO2 (P) 98%   BMI 29.14 kg/m²  Body mass index is 29.14 kg/m².     BP Readings from Last 3 Encounters:   04/04/25 128/86   02/03/25 130/88   12/16/24 (!) 148/95       Wt Readings from Last 3 Encounters:   04/04/25 84.4 kg (186 lb 1.1 oz)   02/03/25 83 kg (183 lb)   12/16/24 81.2 kg (179 lb)     Physical Exam    Respiratory: Lungs are clear.         This note was generated with the assistance of ambient listening technology. Verbal consent was obtained by the patient and accompanying visitor(s) for the recording of patient appointment to facilitate this note. I attest to having reviewed and edited the generated note for accuracy, though some syntax or spelling errors may persist. Please contact the author of this note for any clarification.      Call or return to clinic prn if these symptoms worsen or fail to improve as anticipated.           [1]   Current Outpatient Medications   Medication Sig Dispense Refill    azelastine (ASTELIN) 137 mcg (0.1 %) nasal spray 1 spray (137 mcg total) by Nasal route 2 (two) times daily. 30 mL 2    cetirizine (ZYRTEC) 10 MG tablet Take 1 tablet by mouth every morning.      fexofenadine (ALLEGRA) 180 MG tablet Take 1 tablet (180 mg total) by mouth once daily. 30 tablet 3    fluticasone propionate " (FLONASE) 50 mcg/actuation nasal spray 2 sprays (100 mcg total) by Each Nostril route once daily. 15.8 mL 0    albuterol (PROVENTIL/VENTOLIN HFA) 90 mcg/actuation inhaler 2 puffs every 6 (six) hours as needed. (Patient not taking: Reported on 4/4/2025)      pantoprazole (PROTONIX) 40 MG tablet Take 1 tablet (40 mg total) by mouth 2 (two) times daily. 180 tablet 1     No current facility-administered medications for this visit.

## 2025-04-07 ENCOUNTER — TELEPHONE (OUTPATIENT)
Dept: FAMILY MEDICINE | Facility: CLINIC | Age: 35
End: 2025-04-07
Payer: MEDICAID

## 2025-04-07 NOTE — TELEPHONE ENCOUNTER
----- Message from Genna sent at 4/7/2025  7:47 AM CDT -----  - 4/4-12:30 pm- pt needs refill on albuterol inhaler [phone]

## 2025-04-07 NOTE — TELEPHONE ENCOUNTER
I called the patient in regards to her inhaler request system shows that it has been refused on multiple occasions. Patient may need an appointmentt o discuss this with Mrs. Carroll for any future refills.no answer left voicemail to return our call.    None

## 2025-04-07 NOTE — TELEPHONE ENCOUNTER
----- Message from Genna sent at 4/7/2025  7:47 AM CDT -----  - 4/4-12:30 pm- pt needs refill on albuterol inhaler [phone]

## 2025-04-15 ENCOUNTER — HOSPITAL ENCOUNTER (OUTPATIENT)
Dept: PULMONOLOGY | Facility: HOSPITAL | Age: 35
Discharge: HOME | End: 2025-04-15
Attending: NURSE PRACTITIONER
Payer: MEDICAID

## 2025-04-15 DIAGNOSIS — R05.2 SUBACUTE COUGH: ICD-10-CM

## 2025-04-15 DIAGNOSIS — R06.02 SOB (SHORTNESS OF BREATH): ICD-10-CM

## 2025-04-15 LAB
DLCO SINGLE BREATH LLN: 22.39
DLCO SINGLE BREATH PRE REF: 58.2 %
DLCO SINGLE BREATH REF: 28.12
DLCOC SBVA LLN: 3.76
DLCOC SBVA REF: 5.17
DLCOC SINGLE BREATH LLN: 22.39
DLCOC SINGLE BREATH REF: 28.12
DLCOVA LLN: 3.76
DLCOVA PRE REF: 80.2 %
DLCOVA PRE: 4.14 ML/(MIN*MMHG*L) (ref 3.76–6.58)
DLCOVA REF: 5.17
ERVN2 LLN: -16448.81
ERVN2 PRE REF: 1.5 %
ERVN2 PRE: 0.02 L (ref -16448.81–16451.19)
ERVN2 REF: 1.19
FEF 25 75 LLN: 2.43
FEF 25 75 PRE REF: 78.4 %
FEF 25 75 REF: 3.82
FEV1 FVC LLN: 73
FEV1 FVC PRE REF: 99.8 %
FEV1 FVC REF: 83
FEV1 LLN: 2.29
FEV1 PRE REF: 88 %
FEV1 REF: 2.95
FRCN2 LLN: 2.03
FRCN2 PRE REF: 43.3 %
FRCN2 REF: 2.85
FVC LLN: 2.78
FVC PRE REF: 87.8 %
FVC REF: 3.55
IVC PRE: 3.16 L (ref 2.78–4.35)
IVC SINGLE BREATH LLN: 2.78
IVC SINGLE BREATH PRE REF: 88.9 %
IVC SINGLE BREATH REF: 3.55
PEF LLN: 5.03
PEF PRE REF: 64.3 %
PEF REF: 7.23
PRE DLCO: 16.35 ML/(MIN*MMHG) (ref 22.39–33.85)
PRE FEF 25 75: 3 L/S (ref 2.42–5.22)
PRE FET 100: 5.97 SEC
PRE FEV1 FVC: 83.16 % (ref 72.51–92.17)
PRE FEV1: 2.59 L (ref 2.29–3.58)
PRE FRC N2: 1.23 L (ref 2.03–3.67)
PRE FVC: 3.12 L (ref 2.78–4.35)
PRE PEF: 4.65 L/S (ref 5.03–9.42)
RVN2 LLN: 1.08
RVN2 PRE REF: 73.3 %
RVN2 PRE: 1.21 L (ref 1.08–2.23)
RVN2 REF: 1.66
RVN2TLCN2 LLN: 21.61
RVN2TLCN2 PRE REF: 89.9 %
RVN2TLCN2 PRE: 28.04 % (ref 21.61–40.79)
RVN2TLCN2 REF: 31.2
TLCN2 LLN: 4.45
TLCN2 PRE REF: 79.6 %
TLCN2 PRE: 4.33 L (ref 4.45–6.43)
TLCN2 REF: 5.44
VA PRE: 3.94 L (ref 5.29–5.29)
VA SINGLE BREATH LLN: 5.29
VA SINGLE BREATH PRE REF: 74.5 %
VA SINGLE BREATH REF: 5.29
VCMAXN2 LLN: 2.78
VCMAXN2 PRE REF: 87.8 %
VCMAXN2 PRE: 3.12 L (ref 2.78–4.35)
VCMAXN2 REF: 3.55

## 2025-04-15 PROCEDURE — 94729 DIFFUSING CAPACITY: CPT

## 2025-04-15 PROCEDURE — 94727 GAS DIL/WSHOT DETER LNG VOL: CPT

## 2025-04-15 PROCEDURE — 94010 BREATHING CAPACITY TEST: CPT

## 2025-04-24 ENCOUNTER — TELEPHONE (OUTPATIENT)
Dept: FAMILY MEDICINE | Facility: CLINIC | Age: 35
End: 2025-04-24
Payer: MEDICAID

## 2025-04-24 NOTE — TELEPHONE ENCOUNTER
----- Message from Genna sent at 4/24/2025  9:15 AM CDT -----  Pt had test done at the hospital last week. The results came in on Monday and she would like to go over them [phone]

## 2025-05-07 ENCOUNTER — OFFICE VISIT (OUTPATIENT)
Dept: URGENT CARE | Facility: CLINIC | Age: 35
End: 2025-05-07
Payer: MEDICAID

## 2025-05-07 VITALS
RESPIRATION RATE: 16 BRPM | HEIGHT: 67 IN | BODY MASS INDEX: 29.19 KG/M2 | OXYGEN SATURATION: 100 % | SYSTOLIC BLOOD PRESSURE: 135 MMHG | WEIGHT: 186 LBS | TEMPERATURE: 98 F | DIASTOLIC BLOOD PRESSURE: 94 MMHG | HEART RATE: 105 BPM

## 2025-05-07 DIAGNOSIS — J02.9 SORE THROAT: ICD-10-CM

## 2025-05-07 DIAGNOSIS — J02.0 STREP PHARYNGITIS: Primary | ICD-10-CM

## 2025-05-07 LAB
CTP QC/QA: YES
FLUAV AG NPH QL: NEGATIVE
FLUBV AG NPH QL: NEGATIVE
S PYO RRNA THROAT QL PROBE: POSITIVE
SARS CORONAVIRUS 2 ANTIGEN: NEGATIVE

## 2025-05-07 RX ORDER — AMOXICILLIN 500 MG/1
500 CAPSULE ORAL EVERY 8 HOURS
Qty: 30 CAPSULE | Refills: 0 | Status: SHIPPED | OUTPATIENT
Start: 2025-05-07 | End: 2025-05-17

## 2025-05-07 RX ORDER — FLUCONAZOLE 150 MG/1
150 TABLET ORAL DAILY
Qty: 1 TABLET | Refills: 0 | Status: SHIPPED | OUTPATIENT
Start: 2025-05-07 | End: 2025-05-08

## 2025-05-07 NOTE — PROGRESS NOTES
"Subjective:      Patient ID: Jane Garcia is a 34 y.o. female.    Vitals:  height is 5' 7" (1.702 m) and weight is 84.4 kg (186 lb). Her temperature is 98.3 °F (36.8 °C). Her blood pressure is 135/94 (abnormal) and her pulse is 105. Her respiration is 16 and oxygen saturation is 100%.     Chief Complaint: Sore Throat    34-year-old afebrile female who presents today with chief complaint of sore throat since yesterday.    Sore Throat   This is a new problem. The current episode started yesterday. Associated symptoms comments: Body aches, chills .       HENT:  Positive for sore throat.    Skin:  Negative for erythema.      Objective:     Physical Exam   Constitutional: She is oriented to person, place, and time.  Non-toxic appearance. She does not appear ill. No distress. obesity  HENT:   Head: Normocephalic and atraumatic.   Ears:   Right Ear: Tympanic membrane, external ear and ear canal normal.   Left Ear: Tympanic membrane, external ear and ear canal normal.   Nose: Nose normal.   Mouth/Throat: Mucous membranes are moist. Oropharyngeal exudate and posterior oropharyngeal erythema present.   Eyes: Conjunctivae are normal. Extraocular movement intact   Neck: Neck supple. No neck rigidity present.   Cardiovascular: Normal rate, regular rhythm, normal heart sounds and normal pulses.   Pulmonary/Chest: Effort normal and breath sounds normal.   Abdominal: Normal appearance.   Musculoskeletal: Normal range of motion.         General: Normal range of motion.      Cervical back: She exhibits no tenderness.   Lymphadenopathy:     She has no cervical adenopathy.   Neurological: She is alert and oriented to person, place, and time.   Skin: Skin is warm, dry, not diaphoretic, not pale and no rash. No bruising, No erythema and No lesion no jaundice  Psychiatric: Her behavior is normal.   Vitals reviewed.      Assessment:     1. Strep pharyngitis    2. Sore throat        Plan:       Strep pharyngitis  -     amoxicillin " (AMOXIL) 500 MG capsule; Take 1 capsule (500 mg total) by mouth every 8 (eight) hours. for 10 days  Dispense: 30 capsule; Refill: 0  -     fluconazole (DIFLUCAN) 150 MG Tab; Take 1 tablet (150 mg total) by mouth once daily. for 1 day  Dispense: 1 tablet; Refill: 0    Sore throat  -     POCT rapid strep A  -     SARS Coronavirus 2 Antigen, POCT Manual Read  -     POCT Influenza A/B Rapid Antigen      INSTRUCTIONS  Medication as prescribed.  Rest.  Increase oral fluids.  Follow up with primary care as advised.  The meantime, return here or go to ER for worsening of symptoms, or for any new symptoms as discussed.

## 2025-07-22 DIAGNOSIS — N64.4 MASTODYNIA: ICD-10-CM

## 2025-07-22 DIAGNOSIS — N64.89 GALACTOCELE: Primary | ICD-10-CM

## 2025-08-14 ENCOUNTER — HOSPITAL ENCOUNTER (OUTPATIENT)
Dept: RADIOLOGY | Facility: HOSPITAL | Age: 35
Discharge: HOME | End: 2025-08-14
Attending: STUDENT IN AN ORGANIZED HEALTH CARE EDUCATION/TRAINING PROGRAM
Payer: MEDICAID

## 2025-08-14 DIAGNOSIS — N64.4 MASTODYNIA: ICD-10-CM

## 2025-08-14 DIAGNOSIS — N64.89 GALACTOCELE: ICD-10-CM

## 2025-08-14 PROCEDURE — 76642 ULTRASOUND BREAST LIMITED: CPT | Mod: TC,50,PO

## 2025-08-14 PROCEDURE — 77066 DX MAMMO INCL CAD BI: CPT | Mod: 26,,, | Performed by: RADIOLOGY

## 2025-08-14 PROCEDURE — 76642 ULTRASOUND BREAST LIMITED: CPT | Mod: 26,50,, | Performed by: RADIOLOGY

## 2025-08-14 PROCEDURE — 77062 BREAST TOMOSYNTHESIS BI: CPT | Mod: 26,,, | Performed by: RADIOLOGY

## 2025-08-14 PROCEDURE — 77066 DX MAMMO INCL CAD BI: CPT | Mod: TC,PO

## (undated) DEVICE — SOLUTION IRRI NS BOTTLE 1000ML R5200-01

## (undated) DEVICE — UNDERGLOVE BIOGEL PI MICRO BLUE SZ 8

## (undated) DEVICE — SCISSORS 5MM APPLIED MEDICAL   CB030

## (undated) DEVICE — RETRIEVER ENDOPOUCH SPEC BAG

## (undated) DEVICE — GLOVE BIOGEL MICRO SURGEON PINK SZ 7.5

## (undated) DEVICE — CABLE MONOPOLAR 10FT DISPOSABLE

## (undated) DEVICE — TROCAR BALL. BLNT HASSON C0R47

## (undated) DEVICE — SLEEVE TROCAR 5MMX100MM  CTS02

## (undated) DEVICE — TROCAR OPTICAL ZTHREAD 12MMX100MM CTF73

## (undated) DEVICE — TROCAR OPTICAL ZTHREAD 5MMX100MM CTF03

## (undated) DEVICE — APPLIER CLIP  5MM

## (undated) DEVICE — SUTURE MONOCRYL 4-0 PS-2 27 MCP426H

## (undated) DEVICE — TRAY GENERAL LAPAROSCOPY

## (undated) DEVICE — DERMABOND HVD MINI  DHVM12

## (undated) DEVICE — Device

## (undated) DEVICE — SOLUTION IRRI H2O BOTTLE 1000ML

## (undated) DEVICE — ELECTRODE OLSEN HOOK L-SHAPED 13INX330MM

## (undated) DEVICE — PAD BOVIE ADULT

## (undated) DEVICE — SUCTION/IRRIGATOR W/TIP

## (undated) DEVICE — SUTURE VICRYL #0 27 UR-6 VCP603H